# Patient Record
Sex: FEMALE | Race: BLACK OR AFRICAN AMERICAN | NOT HISPANIC OR LATINO | Employment: FULL TIME | URBAN - METROPOLITAN AREA
[De-identification: names, ages, dates, MRNs, and addresses within clinical notes are randomized per-mention and may not be internally consistent; named-entity substitution may affect disease eponyms.]

---

## 2017-01-09 ENCOUNTER — OFFICE VISIT (OUTPATIENT)
Dept: INTERNAL MEDICINE | Facility: CLINIC | Age: 72
End: 2017-01-09
Payer: COMMERCIAL

## 2017-01-09 ENCOUNTER — HOSPITAL ENCOUNTER (OUTPATIENT)
Dept: RADIOLOGY | Facility: HOSPITAL | Age: 72
Discharge: HOME | End: 2017-01-09
Attending: INTERNAL MEDICINE
Payer: COMMERCIAL

## 2017-01-09 DIAGNOSIS — M25.552 PAIN OF LEFT HIP JOINT: ICD-10-CM

## 2017-01-09 DIAGNOSIS — M25.552 PAIN OF LEFT HIP JOINT: Primary | ICD-10-CM

## 2017-01-09 DIAGNOSIS — N39.0 URINARY TRACT INFECTION WITHOUT HEMATURIA, SITE UNSPECIFIED: ICD-10-CM

## 2017-01-09 PROCEDURE — 73502 X-RAY EXAM HIP UNI 2-3 VIEWS: CPT | Mod: TC,LT

## 2017-01-09 PROCEDURE — 99213 OFFICE O/P EST LOW 20 MIN: CPT | Mod: S$GLB,,, | Performed by: INTERNAL MEDICINE

## 2017-01-09 PROCEDURE — 3079F DIAST BP 80-89 MM HG: CPT | Mod: S$GLB,,, | Performed by: INTERNAL MEDICINE

## 2017-01-09 PROCEDURE — 1157F ADVNC CARE PLAN IN RCRD: CPT | Mod: S$GLB,,, | Performed by: INTERNAL MEDICINE

## 2017-01-09 PROCEDURE — 1160F RVW MEDS BY RX/DR IN RCRD: CPT | Mod: S$GLB,,, | Performed by: INTERNAL MEDICINE

## 2017-01-09 PROCEDURE — 73502 X-RAY EXAM HIP UNI 2-3 VIEWS: CPT | Mod: 26,LT,, | Performed by: RADIOLOGY

## 2017-01-09 PROCEDURE — 99999 PR PBB SHADOW E&M-EST. PATIENT-LVL IV: CPT | Mod: PBBFAC,,, | Performed by: INTERNAL MEDICINE

## 2017-01-09 PROCEDURE — 1159F MED LIST DOCD IN RCRD: CPT | Mod: S$GLB,,, | Performed by: INTERNAL MEDICINE

## 2017-01-09 PROCEDURE — 3075F SYST BP GE 130 - 139MM HG: CPT | Mod: S$GLB,,, | Performed by: INTERNAL MEDICINE

## 2017-01-09 NOTE — MR AVS SNAPSHOT
[facility]  [address]  [place] [address]  Phone: [phone]  Fax: [phone]                  Kj Chávez   2017 11:30 AM   Office Visit    Description:  Female : 1945   Provider:  Ilana Hercules MD   Department:  [facility] - Internal Medicine           Reason for Visit     Hip Pain     Headache     Fall           Diagnoses this Visit        Comments    Pain of left hip joint    -  Primary     Urinary tract infection without hematuria, site unspecified                To Do List           Future Appointments        Provider Department Dept Phone    2017 1:30 PM [facility] [id number] [address] [facility] [phone]    2017 11:30 AM Ilana Hercules MD [facility] [place] [phone]      Goals (5 Years of Data)     None      Follow-Up and Disposition     Return for EP 1 month.      [facility]     [facility] Care [facility] -  Assistance  Registered nurses in the [facility] provide clinical advisement, health education, appointment booking, and other advisory services.  Call for this free service at [phone].             Medications           Message regarding Medications     Verify the changes and/or additions to your medication regime listed below are the same as discussed with your clinician today.  If any of these changes or additions are incorrect, please notify your healthcare provider.        STOP taking these medications     oxybutynin (DITROPAN-XL) 5 MG TR24 Take 1 tablet (5 mg total) by mouth once daily.           Verify that the below list of medications is an accurate representation of the medications you are currently taking.  If none reported, the list may be blank. If incorrect, please contact your healthcare provider. Carry this list with you in case of emergency.           Current Medications     BIOTIN ORAL Take by mouth once daily.    calcium citrate-vitamin D3 315-200 mg  "(CITRACAL+D) 315-200 mg-unit per tablet Take 1 tablet by mouth 2 (two) times daily.    CRAN/VITC/MANNOSE/FOS/BROMELN (CYSTEX CRANBERRY ORAL) Take by mouth once daily.    fish oil-omega-3 fatty acids 300-1,000 mg capsule Take 2 g by mouth once daily.      fluticasone (FLONASE) 50 mcg/actuation nasal spray 2 sprays by Each Nare route once daily.    loratadine-pseudoephedrine  mg (CLARITIN-D 24-HOUR)  mg per 24 hr tablet Take 1 tablet by mouth once daily.           Clinical Reference Information           Vital Signs - Last Recorded  Most recent update: 1/9/2017 11:36 AM by Kady Flores MA    BP Pulse Ht Wt SpO2 BMI    136/84 66 5' 6" (1.676 m) 72 kg (158 lb 11.7 oz) 97% 25.62 kg/m2      Blood Pressure          Most Recent Value    BP  136/84      Allergies as of 1/9/2017     Sulfa (Sulfonamide Antibiotics)    Codeine      Immunizations Administered on Date of Encounter - 1/9/2017     None      Orders Placed During Today's Visit      Normal Orders This Visit    Ambulatory consult to Orthopedics     Future Labs/Procedures Expected by Expires    Urinalysis  1/9/2017 3/10/2018    Urine culture  1/9/2017 3/10/2018    X-Ray Hip 2 View Left  1/9/2017 1/9/2018      [facility] Sign-Up     Activating your [facility] account is as easy as 1-2-3!     1) Visit [email or web address], select Sign Up Now, enter this activation code and your date of birth, then select Next.  [email or web address]  Expires: 2/23/2017 12:07 PM      2) Create a username and password to use when you visit [facility] in the future and select a security question in case you lose your password and select Next.    3) Enter your e-mail address and click Sign Up!    Additional Information  If you have questions, please e-mail [email or web address] or call [phone] to talk to our [facility] staff. Remember, [facility] is NOT to be used for urgent needs. For medical emergencies, dial 911.         "

## 2017-01-12 ENCOUNTER — OFFICE VISIT (OUTPATIENT)
Dept: ORTHOPEDICS | Facility: CLINIC | Age: 72
End: 2017-01-12
Payer: COMMERCIAL

## 2017-01-12 VITALS
WEIGHT: 158.75 LBS | HEIGHT: 66 IN | RESPIRATION RATE: 16 BRPM | SYSTOLIC BLOOD PRESSURE: 121 MMHG | BODY MASS INDEX: 25.51 KG/M2 | HEART RATE: 68 BPM | DIASTOLIC BLOOD PRESSURE: 73 MMHG

## 2017-01-12 DIAGNOSIS — M70.72 BURSITIS OF HIP, LEFT: Primary | ICD-10-CM

## 2017-01-12 PROCEDURE — 99999 PR PBB SHADOW E&M-EST. PATIENT-LVL III: CPT | Mod: PBBFAC,,, | Performed by: PHYSICIAN ASSISTANT

## 2017-01-12 PROCEDURE — 1157F ADVNC CARE PLAN IN RCRD: CPT | Mod: S$GLB,,, | Performed by: PHYSICIAN ASSISTANT

## 2017-01-12 PROCEDURE — 99203 OFFICE O/P NEW LOW 30 MIN: CPT | Mod: 25,S$GLB,, | Performed by: PHYSICIAN ASSISTANT

## 2017-01-12 PROCEDURE — 20610 DRAIN/INJ JOINT/BURSA W/O US: CPT | Mod: LT,S$GLB,, | Performed by: PHYSICIAN ASSISTANT

## 2017-01-12 PROCEDURE — 1160F RVW MEDS BY RX/DR IN RCRD: CPT | Mod: S$GLB,,, | Performed by: PHYSICIAN ASSISTANT

## 2017-01-12 PROCEDURE — 1125F AMNT PAIN NOTED PAIN PRSNT: CPT | Mod: S$GLB,,, | Performed by: PHYSICIAN ASSISTANT

## 2017-01-12 PROCEDURE — 1159F MED LIST DOCD IN RCRD: CPT | Mod: S$GLB,,, | Performed by: PHYSICIAN ASSISTANT

## 2017-01-12 PROCEDURE — 3074F SYST BP LT 130 MM HG: CPT | Mod: S$GLB,,, | Performed by: PHYSICIAN ASSISTANT

## 2017-01-12 PROCEDURE — 3078F DIAST BP <80 MM HG: CPT | Mod: S$GLB,,, | Performed by: PHYSICIAN ASSISTANT

## 2017-01-12 RX ORDER — BETAMETHASONE SODIUM PHOSPHATE AND BETAMETHASONE ACETATE 3; 3 MG/ML; MG/ML
6 INJECTION, SUSPENSION INTRA-ARTICULAR; INTRALESIONAL; INTRAMUSCULAR; SOFT TISSUE
Status: COMPLETED | OUTPATIENT
Start: 2017-01-12 | End: 2017-01-12

## 2017-01-12 RX ADMIN — BETAMETHASONE SODIUM PHOSPHATE AND BETAMETHASONE ACETATE 6 MG: 3; 3 INJECTION, SUSPENSION INTRA-ARTICULAR; INTRALESIONAL; INTRAMUSCULAR; SOFT TISSUE at 01:01

## 2017-01-12 NOTE — PROGRESS NOTES
SUBJECTIVE:     Chief Complaint & History of Present Illness:  Kj Chávez is a New patient 71 y.o. female who is seen here today with a complaint of    Chief Complaint   Patient presents with    Left Hip - Pain    .  She reports she's had pain and tenderness in the lateral aspect of her left hip for over 2 months.  She does not remember a specific trauma or injury to the hip but has had persistent pain in this area not necessarily associated with activity and or resting.  She has taken NSAIDs and warm compresses to the area but has had little relief of her symptoms  On a scale of 1-10, with 10 being worst pain imaginable, he rates this pain as 4 on good days and 8 on bad days.  she describes the pain as tender and sore.      Past Medical History   Diagnosis Date    Allergy     Colon polyp     Gastric nodule     GERD (gastroesophageal reflux disease)     Hypertension      mild, resolved     Osteoporosis        Past Surgical History   Procedure Laterality Date    Tubal ligation      Colonoscopy N/A 2/23/2016     Procedure: COLONOSCOPY;  Surgeon: Christian Medina MD;  Location: [address]);  Service: Endoscopy;  Laterality: N/A;       Family History   Problem Relation Age of Onset    Cancer Mother 80     colon    No Known Problems Father        Review of patient's allergies indicates:   Allergen Reactions    Sulfa (sulfonamide antibiotics) Swelling    Codeine Anxiety         Current Outpatient Prescriptions:     BIOTIN ORAL, Take by mouth once daily., Disp: , Rfl:     calcium citrate-vitamin D3 315-200 mg (CITRACAL+D) 315-200 mg-unit per tablet, Take 1 tablet by mouth 2 (two) times daily., Disp: , Rfl:     CRAN/VITC/MANNOSE/FOS/BROMELN (CYSTEX CRANBERRY ORAL), Take by mouth once daily., Disp: , Rfl:     fish oil-omega-3 fatty acids 300-1,000 mg capsule, Take 2 g by mouth once daily.  , Disp: , Rfl:     fluticasone (FLONASE) 50 mcg/actuation nasal spray, 2 sprays by Each Nare route once  "daily., Disp: 1 Bottle, Rfl: 0    loratadine-pseudoephedrine  mg (CLARITIN-D 24-HOUR)  mg per 24 hr tablet, Take 1 tablet by mouth once daily., Disp: , Rfl:     Review of Systems:  ROS:  Constitutional: no fever or chills  Eyes: no visual changes  ENT: no nasal congestion or sore throat  Respiratory: no cough or shortness of breath  Cardiovascular: no chest pain or palpitations, Positive for aortic atherosclerosis  Gastrointestinal: no nausea or vomiting, tolerating diet  Genitourinary: no hematuria or dysuria  Integument/Breast: no rash or pruritis  Hematologic/Lymphatic: no easy bruising or lymphadenopathy  Musculoskeletal: no arthralgias or myalgias  Neurological: no seizures or tremors  Behavioral/Psych: no auditory or visual hallucinations  Endocrine: no heat or cold intolerance      PE:  Visit Vitals    /73    Pulse 68    Resp 16    Ht 5' 6" (1.676 m)    Wt 72 kg (158 lb 11.7 oz)    BMI 25.62 kg/m2     General: Pleasant, cooperative, NAD   HEENT: NCAT, sclera nonicteric   Lungs: Respirations are equal and unlabored.   Abdomen: Soft and non-tender.  CV: 2+ bilateral upper and lower extremity pulses.   Skin: Intact throughout LE with no rashes, erythema, or lesions  Extremities: No LE edema, NVI lower extremities        Hip Exam:   leftpositives: tenderness over greater trochanter and negatives: FROM  pulses full    140 degrees flexion  -10 degrees extension   40 degrees internal rotation  40 degrees external rotation  45 degrees abduction  -05 degrees adduction   0 flexion contracture      RADIOGRAPHS:  X-rays from previous visit reviewed by me today demonstrate no evidence of fracture dislocation or other bony abnormality no arthritic changes throughout the hip joint space are well-maintained    ASSESSMENT/PLAN:     Plan: We discussed with the patient at length all the different treatment options available for arthrosis of the hip including anti-inflammatories, acetaminophen, rest, " ice, lower extremity strengthening exercise, occasional cortisone injections for temporary relief, and finally total hip arthroplasty.   We will proceed with therapeutic diagnostic cortisone injection of the left greater trochanteric bursa     The injection site was identified and the skin was prepared with an ETOH solution. The  greater trochanteric bursa of the  left  hip was injected with 1 ml of Celestone and 5 ml Lidocaine under sterile technique. Kj Chávez tolerated the procedure well, she was advised to rest the  hip  today, ice and support. she did receive immediate relief of the pain in and about her  hip  she was told this would be short lived and is secondary to the lidocaine. she may have an increase in her discomfort tonight followed by steady improvement over the next several days. It may take 1-3 weeks following the injection to get the full benefit of the medication.  I will see her back in 3-6 months. Sooner if she has any problems or concerns.

## 2017-01-12 NOTE — LETTER
January 12, 2017      Ilana Hercules MD  [address]  [place] [address]           [facility]  [address]  [place] [address]  Phone: [phone]          Patient: Kj Chávez   MR Number: [id number]   YOB: 1945   Date of Visit: 1/12/2017       Dear Dr. Ilana Hercules:    Thank you for referring Kj Chávez to me for evaluation. Attached you will find relevant portions of my assessment and plan of care.    If you have questions, please do not hesitate to call me. I look forward to following Kj Chávez along with you.    Sincerely,    Praveen Bianchi PA-C    Enclosure  CC:  No Recipients    If you would like to receive this communication electronically, please contact [email or web address] or [phone] to request more information on [employer] [facility] access.    For providers and/or their staff who would like to refer a patient to [facility], please contact us through our one-stop-shop provider referral line, [facility] , at [phone].    If you feel you have received this communication in error or would no longer like to receive these types of communications, please e-mail [email or web address]

## 2017-01-14 VITALS
TEMPERATURE: 99 F | BODY MASS INDEX: 25.51 KG/M2 | SYSTOLIC BLOOD PRESSURE: 136 MMHG | HEIGHT: 66 IN | WEIGHT: 158.75 LBS | OXYGEN SATURATION: 97 % | DIASTOLIC BLOOD PRESSURE: 84 MMHG | HEART RATE: 66 BPM

## 2017-01-14 NOTE — PROGRESS NOTES
Subjective:       Patient ID: Kj Chávez is a 71 y.o. female.    Chief Complaint: Hip Pain (left hip x2 mo ) and Fall (in 10/2016)    HPI: She complains of an approximately 2 month history of left hip pain.  She fell in October.  Has had pain since that time.  Denies numbness, no weakness  Review of Systems   Constitutional: Negative for chills, fatigue, fever and unexpected weight change.   Respiratory: Negative for chest tightness and shortness of breath.    Cardiovascular: Negative for chest pain and palpitations.   Gastrointestinal: Negative for abdominal pain and blood in stool.   Neurological: Negative for dizziness, syncope, numbness and headaches.       Objective:      Physical Exam   HENT:   Right Ear: External ear normal.   Left Ear: External ear normal.   Nose: Nose normal.   Mouth/Throat: Oropharynx is clear and moist.   Eyes: Pupils are equal, round, and reactive to light.   Neck: Normal range of motion.   Cardiovascular: Normal rate and regular rhythm.    No murmur heard.  Pulmonary/Chest: Breath sounds normal.   Abdominal: She exhibits no distension. There is no hepatosplenomegaly. There is no tenderness.   Lymphadenopathy:     She has no cervical adenopathy.     She has no axillary adenopathy.   Neurological: She has normal strength and normal reflexes. No cranial nerve deficit or sensory deficit.     left hip without tenderness or redness  Assessment:     assessment and plan: Hip pain: Check left hip x-ray.  Urine sent for urinalysis and culture.  She was here for an urgent care only appointment.  She will return to clinic for a physical      Plan:       As above

## 2017-01-26 ENCOUNTER — OFFICE VISIT (OUTPATIENT)
Dept: INTERNAL MEDICINE | Facility: CLINIC | Age: 72
End: 2017-01-26
Payer: COMMERCIAL

## 2017-01-26 VITALS
DIASTOLIC BLOOD PRESSURE: 70 MMHG | TEMPERATURE: 99 F | HEART RATE: 88 BPM | WEIGHT: 158 LBS | OXYGEN SATURATION: 95 % | HEIGHT: 66 IN | SYSTOLIC BLOOD PRESSURE: 124 MMHG | BODY MASS INDEX: 25.39 KG/M2

## 2017-01-26 DIAGNOSIS — J01.10 ACUTE NON-RECURRENT FRONTAL SINUSITIS: Primary | ICD-10-CM

## 2017-01-26 LAB
CTP QC/QA: YES
S PYO RRNA THROAT QL PROBE: NEGATIVE

## 2017-01-26 PROCEDURE — 1159F MED LIST DOCD IN RCRD: CPT | Mod: S$GLB,,, | Performed by: NURSE PRACTITIONER

## 2017-01-26 PROCEDURE — 1157F ADVNC CARE PLAN IN RCRD: CPT | Mod: S$GLB,,, | Performed by: NURSE PRACTITIONER

## 2017-01-26 PROCEDURE — 3078F DIAST BP <80 MM HG: CPT | Mod: S$GLB,,, | Performed by: NURSE PRACTITIONER

## 2017-01-26 PROCEDURE — 99999 PR PBB SHADOW E&M-EST. PATIENT-LVL IV: CPT | Mod: PBBFAC,,, | Performed by: NURSE PRACTITIONER

## 2017-01-26 PROCEDURE — 87880 STREP A ASSAY W/OPTIC: CPT | Mod: QW,S$GLB,, | Performed by: NURSE PRACTITIONER

## 2017-01-26 PROCEDURE — 3074F SYST BP LT 130 MM HG: CPT | Mod: S$GLB,,, | Performed by: NURSE PRACTITIONER

## 2017-01-26 PROCEDURE — 1125F AMNT PAIN NOTED PAIN PRSNT: CPT | Mod: S$GLB,,, | Performed by: NURSE PRACTITIONER

## 2017-01-26 PROCEDURE — 1160F RVW MEDS BY RX/DR IN RCRD: CPT | Mod: S$GLB,,, | Performed by: NURSE PRACTITIONER

## 2017-01-26 PROCEDURE — 99213 OFFICE O/P EST LOW 20 MIN: CPT | Mod: 25,S$GLB,, | Performed by: NURSE PRACTITIONER

## 2017-01-26 RX ORDER — PROMETHAZINE HYDROCHLORIDE AND DEXTROMETHORPHAN HYDROBROMIDE 6.25; 15 MG/5ML; MG/5ML
5 SYRUP ORAL
Qty: 120 ML | Refills: 0 | Status: SHIPPED | OUTPATIENT
Start: 2017-01-26 | End: 2017-02-05

## 2017-01-26 RX ORDER — AMOXICILLIN AND CLAVULANATE POTASSIUM 875; 125 MG/1; MG/1
1 TABLET, FILM COATED ORAL EVERY 12 HOURS
Qty: 14 TABLET | Refills: 0 | Status: SHIPPED | OUTPATIENT
Start: 2017-01-26 | End: 2017-03-01

## 2017-01-26 NOTE — PROGRESS NOTES
Subjective:       Patient ID: Kj Chávez is a 71 y.o. female.    Chief Complaint: Nasal Congestion    URI    This is a new problem. The current episode started in the past 7 days. The problem has been rapidly worsening. The maximum temperature recorded prior to her arrival was 100.4 - 100.9 F. The fever has been present for 1 to 2 days. Associated symptoms include congestion, coughing, headaches, rhinorrhea, sinus pain, a sore throat and swollen glands. Pertinent negatives include no abdominal pain, chest pain, diarrhea, dysuria, ear pain, joint pain, joint swelling, nausea, neck pain, plugged ear sensation, rash, sneezing, vomiting or wheezing. Treatments tried: mucinex. The treatment provided no relief.     Review of Systems   Constitutional: Positive for chills, diaphoresis and fatigue.   HENT: Positive for congestion, rhinorrhea and sore throat. Negative for ear pain and sneezing.    Eyes: Negative for visual disturbance.   Respiratory: Positive for cough. Negative for wheezing.    Cardiovascular: Negative for chest pain.   Gastrointestinal: Negative for abdominal pain, diarrhea, nausea and vomiting.   Genitourinary: Negative for dysuria.   Musculoskeletal: Negative for joint pain and neck pain.   Skin: Negative for rash.   Neurological: Positive for headaches.   Psychiatric/Behavioral: Negative for confusion.       Objective:      Physical Exam   Constitutional: She is oriented to person, place, and time. She appears well-developed and well-nourished. No distress.   HENT:   Head: Normocephalic.   Nose: Mucosal edema, rhinorrhea and sinus tenderness present. Right sinus exhibits frontal sinus tenderness. Right sinus exhibits no maxillary sinus tenderness. Left sinus exhibits frontal sinus tenderness. Left sinus exhibits no maxillary sinus tenderness.   Mouth/Throat: No oropharyngeal exudate. Tonsils are 2+ on the right. Tonsils are 2+ on the left. Tonsillar exudate.   Eyes: No scleral icterus.   Neck:  Normal range of motion. Neck supple.   Cardiovascular: Normal rate, regular rhythm and normal heart sounds.    Pulmonary/Chest: Effort normal and breath sounds normal. No respiratory distress. She has no wheezes. She has no rales.   Lymphadenopathy:     She has cervical adenopathy.   Neurological: She is alert and oriented to person, place, and time.   Skin: Skin is warm and dry. She is not diaphoretic.   Psychiatric: She has a normal mood and affect. Her behavior is normal.   Nursing note and vitals reviewed.      Assessment:       1. Acute non-recurrent frontal sinusitis        Plan:   1. Acute non-recurrent frontal sinusitis  - POCT rapid strep A  - amoxicillin-clavulanate 875-125mg (AUGMENTIN) 875-125 mg per tablet; Take 1 tablet by mouth every 12 (twelve) hours.  Dispense: 14 tablet; Refill: 0  - promethazine-dextromethorphan (PROMETHAZINE-DM) 6.25-15 mg/5 mL Syrp; Take 5 mLs by mouth every 6 to 8 hours as needed.  Dispense: 120 mL; Refill: 0      Pt has been given instructions populated from [employer] database and has verbalized understanding of the after visit summary and information contained wherein.    Follow up with a primary care provider. May go to ER for acute shortness of breath, lightheadedness, fever, or any other emergent complaints or changes in condition.

## 2017-01-26 NOTE — MR AVS SNAPSHOT
[facility]  [address]  [facility] [address]  Phone: [phone]  Fax: [phone]                  Kj Chávez   2017 2:30 PM   Office Visit    Description:  Female : 1945   Provider:  Greta Darby NP   Department:  [facility]           Reason for Visit     Nasal Congestion           Diagnoses this Visit        Comments    Acute non-recurrent frontal sinusitis    -  Primary            To Do List           Future Appointments        Provider Department Dept Phone    2017 11:30 AM Ilana Hercules MD [facility] [phone]      Goals (5 Years of Data)     None       These Medications        Disp Refills Start End    amoxicillin-clavulanate 875-125mg (AUGMENTIN) 875-125 mg per tablet 14 tablet 0 2017     Take 1 tablet by mouth every 12 (twelve) hours. - Oral    Pharmacy: [facility] [address] [place] [address] [address] [place] [address] Ph #: [phone]       promethazine-dextromethorphan (PROMETHAZINE-DM) 6.25-15 mg/5 mL Syrp 120 mL 0 2017    Take 5 mLs by mouth every 6 to 8 hours as needed. - Oral    Pharmacy: [facility] [address] [place] [address] [address] [facility] Ph #: [phone]         [facility]     [facility] On Call Nurse Care Line -  Assistance  Registered nurses in the [facility] Call Center provide clinical advisement, health education, appointment booking, and other advisory services.  Call for this free service at [phone].             Medications           Message regarding Medications     Verify the changes and/or additions to your medication regime listed below are the same as discussed with your clinician today.  If any of these changes or additions are incorrect, please notify your healthcare provider.        START taking these NEW medications        Refills    amoxicillin-clavulanate 875-125mg (AUGMENTIN) 875-125 mg per tablet 0    Sig: Take 1 tablet by mouth  "every 12 (twelve) hours.    Class: Normal    Route: Oral    promethazine-dextromethorphan (PROMETHAZINE-DM) 6.25-15 mg/5 mL Syrp 0    Sig: Take 5 mLs by mouth every 6 to 8 hours as needed.    Class: Normal    Route: Oral           Verify that the below list of medications is an accurate representation of the medications you are currently taking.  If none reported, the list may be blank. If incorrect, please contact your healthcare provider. Carry this list with you in case of emergency.           Current Medications     BIOTIN ORAL Take by mouth once daily.    calcium citrate-vitamin D3 315-200 mg (CITRACAL+D) 315-200 mg-unit per tablet Take 1 tablet by mouth 2 (two) times daily.    CRAN/VITC/MANNOSE/FOS/BROMELN (CYSTEX CRANBERRY ORAL) Take by mouth once daily.    fish oil-omega-3 fatty acids 300-1,000 mg capsule Take 2 g by mouth once daily.      fluticasone (FLONASE) 50 mcg/actuation nasal spray 2 sprays by Each Nare route once daily.    loratadine-pseudoephedrine  mg (CLARITIN-D 24-HOUR)  mg per 24 hr tablet Take 1 tablet by mouth once daily.    amoxicillin-clavulanate 875-125mg (AUGMENTIN) 875-125 mg per tablet Take 1 tablet by mouth every 12 (twelve) hours.    promethazine-dextromethorphan (PROMETHAZINE-DM) 6.25-15 mg/5 mL Syrp Take 5 mLs by mouth every 6 to 8 hours as needed.           Clinical Reference Information           Vital Signs - Last Recorded  Most recent update: 1/26/2017  2:41 PM by Emily Mathis MA    BP Pulse Temp Ht Wt SpO2    124/70 88 99.2 °F (37.3 °C) 5' 6" (1.676 m) 71.7 kg (158 lb) 95%    BMI                25.5 kg/m2          Blood Pressure          Most Recent Value    BP  124/70      Allergies as of 1/26/2017     Sulfa (Sulfonamide Antibiotics)    Codeine      Immunizations Administered on Date of Encounter - 1/26/2017     None      Orders Placed During Today's Visit      Normal Orders This Visit    POCT rapid strep A                   [facility] Sign-Up     Activating " your [facility] account is as easy as 1-2-3!     1) Visit [email or web address], select Sign Up Now, enter this activation code and your date of birth, then select Next.  [email or web address]  Expires: 2/23/2017 12:07 PM      2) Create a username and password to use when you visit [facility] in the future and select a security question in case you lose your password and select Next.    3) Enter your e-mail address and click Sign Up!    Additional Information  If you have questions, please e-mail [email or web address] or call [phone] to talk to our [facility] staff. Remember, [facility] is NOT to be used for urgent needs. For medical emergencies, dial 911.         Instructions    Alternate Tylenol and Advil every 4 hours for fever.  Drink LOTS of water  Eat yogurt daily while taking antibiotics.

## 2017-01-26 NOTE — PATIENT INSTRUCTIONS
Alternate Tylenol and Advil every 4 hours for fever.  Drink LOTS of water  Eat yogurt daily while taking antibiotics.

## 2017-02-09 ENCOUNTER — OFFICE VISIT (OUTPATIENT)
Dept: INTERNAL MEDICINE | Facility: CLINIC | Age: 72
End: 2017-02-09
Payer: COMMERCIAL

## 2017-02-09 DIAGNOSIS — Z01.419 ENCOUNTER FOR WELL WOMAN EXAM WITH ROUTINE GYNECOLOGICAL EXAM: ICD-10-CM

## 2017-02-09 DIAGNOSIS — N12 PYELONEPHRITIS: Primary | ICD-10-CM

## 2017-02-09 PROCEDURE — 3079F DIAST BP 80-89 MM HG: CPT | Mod: S$GLB,,, | Performed by: INTERNAL MEDICINE

## 2017-02-09 PROCEDURE — 1160F RVW MEDS BY RX/DR IN RCRD: CPT | Mod: S$GLB,,, | Performed by: INTERNAL MEDICINE

## 2017-02-09 PROCEDURE — 1126F AMNT PAIN NOTED NONE PRSNT: CPT | Mod: S$GLB,,, | Performed by: INTERNAL MEDICINE

## 2017-02-09 PROCEDURE — 1159F MED LIST DOCD IN RCRD: CPT | Mod: S$GLB,,, | Performed by: INTERNAL MEDICINE

## 2017-02-09 PROCEDURE — 1157F ADVNC CARE PLAN IN RCRD: CPT | Mod: S$GLB,,, | Performed by: INTERNAL MEDICINE

## 2017-02-09 PROCEDURE — 99999 PR PBB SHADOW E&M-EST. PATIENT-LVL IV: CPT | Mod: PBBFAC,,, | Performed by: INTERNAL MEDICINE

## 2017-02-09 PROCEDURE — 99213 OFFICE O/P EST LOW 20 MIN: CPT | Mod: S$GLB,,, | Performed by: INTERNAL MEDICINE

## 2017-02-09 PROCEDURE — 3075F SYST BP GE 130 - 139MM HG: CPT | Mod: S$GLB,,, | Performed by: INTERNAL MEDICINE

## 2017-02-09 RX ORDER — DOXYCYCLINE 100 MG/1
100 CAPSULE ORAL 2 TIMES DAILY
Qty: 14 CAPSULE | Refills: 0 | Status: SHIPPED | OUTPATIENT
Start: 2017-02-09 | End: 2017-03-01

## 2017-02-09 NOTE — MR AVS SNAPSHOT
[facility]  [address]  [place] [address]  Phone: [phone]  Fax: [phone]                  Kj Chávez   2017 11:30 AM   Office Visit    Description:  Female : 1945   Provider:  Ilana Hercules MD   Department:  [facility]           Reason for Visit     Follow-up           Diagnoses this Visit        Comments    Pyelonephritis    -  Primary     Encounter for well woman exam with routine gynecological exam                To Do List           Future Appointments        Provider Department Dept Phone    2017 12:30 PM LAB, APPOINTMENT [facility] [phone]      Goals (5 Years of Data)     None       These Medications        Disp Refills Start End    doxycycline (MONODOX) 100 MG capsule 14 capsule 0 2017     Take 1 capsule (100 mg total) by mouth 2 (two) times daily. With food - Oral    Pharmacy: [facility] [address] [place] [address] [address] Ph #: [phone]         [facility] On Call     [facility] Nurse Care Line -  Assistance  Registered nurses in the [facility] Center provide clinical advisement, health education, appointment booking, and other advisory services.  Call for this free service at [phone].             Medications           Message regarding Medications     Verify the changes and/or additions to your medication regime listed below are the same as discussed with your clinician today.  If any of these changes or additions are incorrect, please notify your healthcare provider.        START taking these NEW medications        Refills    doxycycline (MONODOX) 100 MG capsule 0    Sig: Take 1 capsule (100 mg total) by mouth 2 (two) times daily. With food    Class: Normal    Route: Oral           Verify that the below list of medications is an accurate representation of the medications you are currently taking.  If none reported, the list may be  "blank. If incorrect, please contact your healthcare provider. Carry this list with you in case of emergency.           Current Medications     amoxicillin-clavulanate 875-125mg (AUGMENTIN) 875-125 mg per tablet Take 1 tablet by mouth every 12 (twelve) hours.    BIOTIN ORAL Take by mouth once daily.    calcium citrate-vitamin D3 315-200 mg (CITRACAL+D) 315-200 mg-unit per tablet Take 1 tablet by mouth 2 (two) times daily.    CRAN/VITC/MANNOSE/FOS/BROMELN (CYSTEX CRANBERRY ORAL) Take by mouth once daily.    fish oil-omega-3 fatty acids 300-1,000 mg capsule Take 2 g by mouth once daily.      fluticasone (FLONASE) 50 mcg/actuation nasal spray 2 sprays by Each Nare route once daily.    loratadine-pseudoephedrine  mg (CLARITIN-D 24-HOUR)  mg per 24 hr tablet Take 1 tablet by mouth once daily.    doxycycline (MONODOX) 100 MG capsule Take 1 capsule (100 mg total) by mouth 2 (two) times daily. With food           Clinical Reference Information           Your Vitals Were     BP Pulse Height Weight SpO2 BMI    130/88 69 5' 6" (1.676 m) 73.1 kg (161 lb 2.5 oz) 97% 26.01 kg/m2      Blood Pressure          Most Recent Value    BP  130/88      Allergies as of 2/9/2017     Sulfa (Sulfonamide Antibiotics)    Codeine      Immunizations Administered on Date of Encounter - 2/9/2017     None      Orders Placed During Today's Visit      Normal Orders This Visit    Ambulatory Referral to Obstetrics / Gynecology     Future Labs/Procedures Expected by Expires    Comprehensive metabolic panel  2/9/2017 2/9/2018    CT Abdomen Pelvis With Contrast  2/9/2017 2/9/2018    Lipid panel  2/9/2017 2/9/2018      [facility] Sign-Up     Activating your [facility] account is as easy as 1-2-3!     1) Visit [email or web address], select Sign Up Now, enter this activation code and your date of birth, then select Next.  [email or web address]  Expires: 2/23/2017 12:07 PM      2) Create a username and password to use when you visit [facility] in the future and " select a security question in case you lose your password and select Next.    3) Enter your e-mail address and click Sign Up!    Additional Information  If you have questions, please e-mail [email or web address] or call [phone] to talk to our [facility] staff. Remember, [facility] is NOT to be used for urgent needs. For medical emergencies, dial 911.         Language Assistance Services     ATTENTION: Language assistance services are available, free of charge. Please call [phone].      ATENCIÓN: Si habla español, tiene a vásquez disposición servicios gratuitos de asistencia lingüística. Llame al [phone].     CHÚ Ý: N?u b?n nói Ti?ng Vi?t, có các d?ch v? h? tr? ngôn ng? mi?n phí dành cho b?n. G?i s? [phone].         [facility] complies with applicable Federal civil rights laws and does not discriminate on the basis of race, color, national origin, age, disability, or sex.

## 2017-02-11 ENCOUNTER — TELEPHONE (OUTPATIENT)
Dept: INTERNAL MEDICINE | Facility: CLINIC | Age: 72
End: 2017-02-11

## 2017-02-11 DIAGNOSIS — E78.5 HYPERLIPIDEMIA, UNSPECIFIED HYPERLIPIDEMIA TYPE: Primary | ICD-10-CM

## 2017-02-11 NOTE — TELEPHONE ENCOUNTER
Blayneease contact patient and let her know all of her labwork is acceptable except her cholesterol is elevated.Please advise her to follow a low fat diet. If she would like to see a dietician, order in,please schedule.    I would like to recheck her cholesterol in 3 months. Lab orders in,please schedule for May

## 2017-02-12 VITALS
TEMPERATURE: 99 F | BODY MASS INDEX: 25.9 KG/M2 | OXYGEN SATURATION: 97 % | WEIGHT: 161.19 LBS | HEART RATE: 69 BPM | HEIGHT: 66 IN | DIASTOLIC BLOOD PRESSURE: 88 MMHG | SYSTOLIC BLOOD PRESSURE: 130 MMHG

## 2017-02-13 NOTE — PROGRESS NOTES
Subjective:       Patient ID: jK Chávez is a 71 y.o. female.    Chief Complaint: Follow-up    HPI: She was recently hospitalized with pyelonephritis.  Please see hospitalization records.  Currently asymptomatic    Past medical history: Anemia, pyelonephritis, positive family history of colon cancer-mother, colon adenoma.  She had a colonoscopy February 2016    Medications: None    ALLERGIES: Sulfa, codeine      Review of Systems   Constitutional: Negative for chills, fatigue, fever and unexpected weight change.   Respiratory: Negative for chest tightness and shortness of breath.    Cardiovascular: Negative for chest pain and palpitations.   Gastrointestinal: Negative for abdominal pain and blood in stool.   Neurological: Negative for dizziness, syncope, numbness and headaches.       Objective:      Physical Exam   HENT:   Right Ear: External ear normal.   Left Ear: External ear normal.   Nose: Nose normal.   Mouth/Throat: Oropharynx is clear and moist.   Eyes: Pupils are equal, round, and reactive to light.   Neck: Normal range of motion.   Cardiovascular: Normal rate and regular rhythm.    No murmur heard.  Pulmonary/Chest: Breath sounds normal.   Abdominal: She exhibits no distension. There is no hepatosplenomegaly. There is no tenderness.   Lymphadenopathy:     She has no cervical adenopathy.     She has no axillary adenopathy.   Neurological: She has normal strength and normal reflexes. No cranial nerve deficit or sensory deficit.     rectal exam: Heme-negative, no mass palpated  Assessment:         assessment and plan: Pyelonephritis: Schedule repeat CT scan abdomen and pelvis.  Check CMP and lipid panel  Plan:       As above

## 2017-02-15 ENCOUNTER — HOSPITAL ENCOUNTER (OUTPATIENT)
Dept: RADIOLOGY | Facility: HOSPITAL | Age: 72
Discharge: HOME | End: 2017-02-15
Attending: INTERNAL MEDICINE
Payer: COMMERCIAL

## 2017-02-15 DIAGNOSIS — N12 PYELONEPHRITIS: ICD-10-CM

## 2017-02-15 PROCEDURE — 74177 CT ABD & PELVIS W/CONTRAST: CPT | Mod: TC

## 2017-02-15 PROCEDURE — 74177 CT ABD & PELVIS W/CONTRAST: CPT | Mod: 26,,, | Performed by: RADIOLOGY

## 2017-02-15 PROCEDURE — 25500020 PHARM REV CODE 255: Performed by: INTERNAL MEDICINE

## 2017-02-15 RX ADMIN — IOHEXOL 75 ML: 350 INJECTION, SOLUTION INTRAVENOUS at 07:02

## 2017-02-15 RX ADMIN — IOHEXOL 15 ML: 350 INJECTION, SOLUTION INTRAVENOUS at 05:02

## 2017-02-15 RX ADMIN — IOHEXOL 15 ML: 350 INJECTION, SOLUTION INTRAVENOUS at 04:02

## 2017-02-18 ENCOUNTER — TELEPHONE (OUTPATIENT)
Dept: INTERNAL MEDICINE | Facility: CLINIC | Age: 72
End: 2017-02-18

## 2017-02-18 DIAGNOSIS — N28.89 RENAL MASS: Primary | ICD-10-CM

## 2017-03-01 ENCOUNTER — OFFICE VISIT (OUTPATIENT)
Dept: UROLOGY | Facility: CLINIC | Age: 72
End: 2017-03-01
Payer: COMMERCIAL

## 2017-03-01 VITALS
WEIGHT: 157.88 LBS | HEART RATE: 72 BPM | DIASTOLIC BLOOD PRESSURE: 73 MMHG | BODY MASS INDEX: 25.37 KG/M2 | HEIGHT: 66 IN | SYSTOLIC BLOOD PRESSURE: 134 MMHG

## 2017-03-01 DIAGNOSIS — R39.15 URGENCY OF URINATION: Primary | ICD-10-CM

## 2017-03-01 DIAGNOSIS — N39.41 URGE INCONTINENCE OF URINE: ICD-10-CM

## 2017-03-01 PROCEDURE — 99213 OFFICE O/P EST LOW 20 MIN: CPT | Mod: 25,S$GLB,, | Performed by: PHYSICIAN ASSISTANT

## 2017-03-01 PROCEDURE — 1126F AMNT PAIN NOTED NONE PRSNT: CPT | Mod: S$GLB,,, | Performed by: PHYSICIAN ASSISTANT

## 2017-03-01 PROCEDURE — 51798 US URINE CAPACITY MEASURE: CPT | Mod: S$GLB,,, | Performed by: PHYSICIAN ASSISTANT

## 2017-03-01 PROCEDURE — 1157F ADVNC CARE PLAN IN RCRD: CPT | Mod: S$GLB,,, | Performed by: PHYSICIAN ASSISTANT

## 2017-03-01 PROCEDURE — 1160F RVW MEDS BY RX/DR IN RCRD: CPT | Mod: S$GLB,,, | Performed by: PHYSICIAN ASSISTANT

## 2017-03-01 PROCEDURE — 81002 URINALYSIS NONAUTO W/O SCOPE: CPT | Mod: S$GLB,,, | Performed by: PHYSICIAN ASSISTANT

## 2017-03-01 PROCEDURE — 3078F DIAST BP <80 MM HG: CPT | Mod: S$GLB,,, | Performed by: PHYSICIAN ASSISTANT

## 2017-03-01 PROCEDURE — 1159F MED LIST DOCD IN RCRD: CPT | Mod: S$GLB,,, | Performed by: PHYSICIAN ASSISTANT

## 2017-03-01 PROCEDURE — 3075F SYST BP GE 130 - 139MM HG: CPT | Mod: S$GLB,,, | Performed by: PHYSICIAN ASSISTANT

## 2017-03-01 PROCEDURE — 99999 PR PBB SHADOW E&M-EST. PATIENT-LVL III: CPT | Mod: PBBFAC,,, | Performed by: PHYSICIAN ASSISTANT

## 2017-03-01 RX ORDER — OXYBUTYNIN CHLORIDE 5 MG/1
5 TABLET, EXTENDED RELEASE ORAL DAILY
Qty: 30 TABLET | Refills: 11 | Status: SHIPPED | OUTPATIENT
Start: 2017-03-01 | End: 2017-12-18 | Stop reason: SDUPTHER

## 2017-03-01 NOTE — PROGRESS NOTES
"CHIEF COMPLAINT:    Mrs. Chávez is a 71 y.o. female presenting to "discuss CT scan".  PRESENTING ILLNESS:    Kj Chávez is a 71 y.o. female with a PMH of pyelonephritis who presents for "discuss CT scan".  Patient reports having a CT scan done instructed by internal medicine to follow up with urology.   She has a history of pyelonephritis and was treated in March 2016.  She was last seen in clinic in April 2016.  At that time she was prescribed oxybutynin for her urinary symptoms (frequency, urgency). Her symptoms were controlled with Oxybutynin.  She did not know she had refills so she is no longer taking it.    She no longer has frequency but reports urgency.  She rarely has urge incontinence.  She typically does not have nocturia unless she drinks close to bed time.    She tries to limit her tea intake.  She reports complete bladder emptying.      CT abd/pelvis 2/2017: No CT evidence to suggest acute pyelonephritis.  Stable right renal simple cyst. Probable small subcentimeter left renal cyst       CT abd/pelvis 2/29/16: Geographic areas of decreased enhancement present within the right kidney suggesting pyelonephritis.  No evidence of renal abscess.  Follow-up examination is recommended after treatment to ensure resolution as renal infarction and neoplasm can present in a similar fashion.  Additionally, there is abnormal wall thickening enhancement present along the right renal pelvis and ureter suggesting pyeloureteritis.      Urine cultures:   2/29/16: ESCHERICHIA COLI>100,000     REVIEW OF SYSTEMS:  Constitutional: Negative for fever and chills.   HENT: Negative for hearing loss.   Eyes: Negative for visual disturbance.   Respiratory: Negative for shortness of breath.   Cardiovascular: Negative for chest pain.   Gastrointestinal: Negative for nausea, vomiting, and constipation.   Genitourinary: Positive for urgency.  Negative for  frequency, difficulty urinating, dysuria, hematuria, intermittency, " hesitancy, incomplete bladder emptying, and decreased urine volume.   Neurological: Positive for dizziness when standing too quickly.   Hematological: Does not bruise/bleed easily.   Psychiatric/Behavioral: Negative for confusion.     PATIENT HISTORY:    Past Medical History:   Diagnosis Date    Allergy     Colon polyp     Gastric nodule     GERD (gastroesophageal reflux disease)     Hypertension     mild, resolved     Osteoporosis        Past Surgical History:   Procedure Laterality Date    COLONOSCOPY N/A 2/23/2016    Procedure: COLONOSCOPY;  Surgeon: Christian Medina MD;  Location: [address];  Service: Endoscopy;  Laterality: N/A;    TUBAL LIGATION         Family History   Problem Relation Age of Onset    Cancer Mother 80     colon    No Known Problems Father        Social History     Social History    Marital status:      Spouse name: Augie    Number of children: 3    Years of education: N/A     Occupational History     [employer]      Social History Main Topics    Smoking status: Never Smoker    Smokeless tobacco: Never Used    Alcohol use No    Drug use: No    Sexual activity: Not on file     Other Topics Concern    Not on file     Social History Narrative       Allergies:  Sulfa (sulfonamide antibiotics) and Codeine    Medications:    Current Outpatient Prescriptions:     BIOTIN ORAL, Take by mouth once daily., Disp: , Rfl:     calcium citrate-vitamin D3 315-200 mg (CITRACAL+D) 315-200 mg-unit per tablet, Take 1 tablet by mouth 2 (two) times daily., Disp: , Rfl:     CRAN/VITC/MANNOSE/FOS/BROMELN (CYSTEX CRANBERRY ORAL), Take by mouth once daily., Disp: , Rfl:     fish oil-omega-3 fatty acids 300-1,000 mg capsule, Take 2 g by mouth once daily.  , Disp: , Rfl:     fluticasone (FLONASE) 50 mcg/actuation nasal spray, 2 sprays by Each Nare route once daily., Disp: 1 Bottle, Rfl: 0    loratadine-pseudoephedrine  mg (CLARITIN-D 24-HOUR)  mg per 24 hr  tablet, Take 1 tablet by mouth once daily., Disp: , Rfl:     oxybutynin (DITROPAN-XL) 5 MG TR24, Take 1 tablet (5 mg total) by mouth once daily., Disp: 30 tablet, Rfl: 11    PHYSICAL EXAMINATION:    Constitutional: She is oriented to person, place, and time. She appears well-developed and well-nourished.  She is in no apparent distress.    Neck: No tracheal deviation present.     Cardiovascular: Normal rate.      Pulmonary/Chest: Effort normal. No respiratory distress.     Abdominal:  She exhibits no distension.  There is no CVA tenderness.     Lymphadenopathy:          Right: No supraclavicular adenopathy present.        Left: No supraclavicular adenopathy present.     Neurological: She is alert and oriented to person, place, and time.     Skin: Skin is warm and dry.     Extremities: No pitting edema noted in lower extremities bilaterally    Psych: Cooperative with normal affect.    Bladder scan performed by Nurse Clark.  PVR 0ml    Physical Exam      LABS:    U/a: 1.020, pH 5, + leuks, tr protein, otherwise negative.      IMPRESSION:    Encounter Diagnoses   Name Primary?    Urgency of urination Yes    Urge incontinence of urine        PLAN:    Will refill oxybutynin.  Side effects including constipation, dry eyes, dry mouth discussed.    Discussed bladder irritants.  Patient informed that beverages containing caffeine can contribute to urgency and urge incontinence.  She was encouraged to continue to limit tea intake.   She was encouraged to limit fluid intake prior to bed.      Follow up in 3 months.      Nataliya Lambert PA-C

## 2017-03-01 NOTE — LETTER
March 1, 2017      Ilana Hercules MD  [address]  [place] [address]           [facility] - Urology 4th Floor  [address]  [place] [address]  Phone: [phone]          Patient: Kj Chávez   MR Number: [id number]   YOB: 1945   Date of Visit: 3/1/2017       Dear Dr. Ilana Hercules:    Thank you for referring Kj Chávez to me for evaluation. Attached you will find relevant portions of my assessment and plan of care.    If you have questions, please do not hesitate to call me. I look forward to following Kj Chávez along with you.    Sincerely,    Nataliya Lambert PA-C    Enclosure  CC:  No Recipients    If you would like to receive this communication electronically, please contact [email or web address] or [phone] to request more information on [employer] access.    For providers and/or their staff who would like to refer a patient to [facility], please contact us through our one-stop-shop provider referral line, [facility], at [phone].    If you feel you have received this communication in error or would no longer like to receive these types of communications, please e-mail [email or web address]

## 2017-03-02 NOTE — TELEPHONE ENCOUNTER
Please try calling her again. If she does not answer,please mail her a letter asking her to contact our office

## 2017-06-05 ENCOUNTER — OFFICE VISIT (OUTPATIENT)
Dept: UROLOGY | Facility: CLINIC | Age: 72
End: 2017-06-05
Payer: COMMERCIAL

## 2017-06-05 VITALS
DIASTOLIC BLOOD PRESSURE: 69 MMHG | SYSTOLIC BLOOD PRESSURE: 122 MMHG | BODY MASS INDEX: 25.51 KG/M2 | HEART RATE: 70 BPM | WEIGHT: 158.75 LBS | HEIGHT: 66 IN

## 2017-06-05 DIAGNOSIS — N39.41 URGE INCONTINENCE OF URINE: ICD-10-CM

## 2017-06-05 DIAGNOSIS — R39.15 URGENCY OF URINATION: Primary | ICD-10-CM

## 2017-06-05 PROCEDURE — 1159F MED LIST DOCD IN RCRD: CPT | Mod: S$GLB,,, | Performed by: PHYSICIAN ASSISTANT

## 2017-06-05 PROCEDURE — 99999 PR PBB SHADOW E&M-EST. PATIENT-LVL III: CPT | Mod: PBBFAC,,, | Performed by: PHYSICIAN ASSISTANT

## 2017-06-05 PROCEDURE — 99213 OFFICE O/P EST LOW 20 MIN: CPT | Mod: S$GLB,,, | Performed by: PHYSICIAN ASSISTANT

## 2017-06-05 PROCEDURE — 1126F AMNT PAIN NOTED NONE PRSNT: CPT | Mod: S$GLB,,, | Performed by: PHYSICIAN ASSISTANT

## 2017-06-05 NOTE — PROGRESS NOTES
"CHIEF COMPLAINT:    Mrs. Chávez is a 71 y.o. female presenting for urge incontinence and urgency.  PRESENTING ILLNESS:    Kj Chávez is a 71 y.o. female who presents for urge incontinence and urgency follow up.  She was last seen in clinic on 3/1/17 was instructed to restart oxybutynin.    Her urinary incontinence and urgency have improved.  She does not have any urinary complaints.  She denies dysuria.  When she drinks smith tea it "pushes" her to go to the restroom so she stays away from it.    She reports a good urinary stream and complete bladder emptying.    REVIEW OF SYSTEMS:    Constitutional: Negative for fever and chills.   HENT: Negative for hearing loss.   Eyes: Negative for visual disturbance.   Respiratory: Negative for shortness of breath.   Cardiovascular: Negative for chest pain.   Gastrointestinal: Negative for nausea, vomiting, and constipation.   Genitourinary:  Negative for frequency, nocturia, dysuria, hematuria, intermittency, hesitancy,  difficulty urinating, incomplete bladder emptying, and decreased urine volume.   Neurological: Negative for dizziness.   Hematological: Does bruise/bleed easily.   Psychiatric/Behavioral: Negative for confusion.     PATIENT HISTORY:    Past Medical History:   Diagnosis Date    Allergy     Colon polyp     Gastric nodule     GERD (gastroesophageal reflux disease)     Hypertension     mild, resolved     Osteoporosis        Past Surgical History:   Procedure Laterality Date    COLONOSCOPY N/A 2/23/2016    Procedure: COLONOSCOPY;  Surgeon: Christian Medina MD;  Location: [address];  Service: Endoscopy;  Laterality: N/A;    TUBAL LIGATION         Family History   Problem Relation Age of Onset    Cancer Mother 80     colon    No Known Problems Father        Social History     Social History    Marital status:      Spouse name: Augie    Number of children: 3    Years of education: N/A     Occupational History     [employer]      Social History Main Topics    Smoking status: Never Smoker    Smokeless tobacco: Never Used    Alcohol use No    Drug use: No    Sexual activity: Not on file     Other Topics Concern    Not on file     Social History Narrative    No narrative on file       Allergies:  Sulfa (sulfonamide antibiotics) and Codeine    Medications:    Current Outpatient Prescriptions:     BIOTIN ORAL, Take by mouth once daily., Disp: , Rfl:     calcium citrate-vitamin D3 315-200 mg (CITRACAL+D) 315-200 mg-unit per tablet, Take 1 tablet by mouth 2 (two) times daily., Disp: , Rfl:     CRAN/VITC/MANNOSE/FOS/BROMELN (CYSTEX CRANBERRY ORAL), Take by mouth once daily., Disp: , Rfl:     fish oil-omega-3 fatty acids 300-1,000 mg capsule, Take 2 g by mouth once daily.  , Disp: , Rfl:     fluticasone (FLONASE) 50 mcg/actuation nasal spray, 2 sprays by Each Nare route once daily., Disp: 1 Bottle, Rfl: 0    loratadine-pseudoephedrine  mg (CLARITIN-D 24-HOUR)  mg per 24 hr tablet, Take 1 tablet by mouth once daily., Disp: , Rfl:     oxybutynin (DITROPAN-XL) 5 MG TR24, Take 1 tablet (5 mg total) by mouth once daily., Disp: 30 tablet, Rfl: 11    PHYSICAL EXAMINATION:    Constitutional: She appears well-developed and well-nourished.  She is in no apparent distress.    Eyes: No scleral icterus noted bilaterally.  No discharge noted bilaterally.      Cardiovascular: Normal rate.  No pitting edema noted in lower extremities bilaterally    Pulmonary/Chest: Effort normal. No respiratory distress.     Abdominal:  She exhibits no distension.  There is no CVA tenderness.     Lymphadenopathy:          Right: No supraclavicular adenopathy present.        Left: No supraclavicular adenopathy present.     Neurological: She is alert and oriented to person, place, and time.     Skin: Skin is warm and dry.     Psych: Cooperative with normal affect.    Physical Exam      LABS:      IMPRESSION:    No diagnosis  found.    PLAN:    Continue to avoid bladder irritants.    Continue oxybutynin    Follow up in 1 year.      Nataliya Lambert PA-C

## 2017-07-17 DIAGNOSIS — Z86.018: Primary | ICD-10-CM

## 2017-08-03 ENCOUNTER — ANESTHESIA EVENT (OUTPATIENT)
Dept: ENDOSCOPY | Facility: HOSPITAL | Age: 72
End: 2017-08-03
Payer: COMMERCIAL

## 2017-08-03 ENCOUNTER — ANESTHESIA (OUTPATIENT)
Dept: ENDOSCOPY | Facility: HOSPITAL | Age: 72
End: 2017-08-03
Payer: COMMERCIAL

## 2017-08-03 ENCOUNTER — SURGERY (OUTPATIENT)
Age: 72
End: 2017-08-03

## 2017-08-03 ENCOUNTER — HOSPITAL ENCOUNTER (OUTPATIENT)
Facility: HOSPITAL | Age: 72
Discharge: HOME | End: 2017-08-03
Attending: INTERNAL MEDICINE | Admitting: INTERNAL MEDICINE
Payer: COMMERCIAL

## 2017-08-03 VITALS
RESPIRATION RATE: 27 BRPM | HEIGHT: 66 IN | RESPIRATION RATE: 12 BRPM | HEART RATE: 76 BPM | DIASTOLIC BLOOD PRESSURE: 78 MMHG | OXYGEN SATURATION: 95 % | BODY MASS INDEX: 25.39 KG/M2 | TEMPERATURE: 99 F | WEIGHT: 158 LBS | SYSTOLIC BLOOD PRESSURE: 129 MMHG

## 2017-08-03 DIAGNOSIS — Z86.018 HISTORY OF BENIGN GASTRIC TUMOR: Primary | ICD-10-CM

## 2017-08-03 PROCEDURE — 43235 EGD DIAGNOSTIC BRUSH WASH: CPT | Mod: ,,, | Performed by: INTERNAL MEDICINE

## 2017-08-03 PROCEDURE — D9220A PRA ANESTHESIA: Mod: CRNA,,, | Performed by: NURSE ANESTHETIST, CERTIFIED REGISTERED

## 2017-08-03 PROCEDURE — 37000008 HC ANESTHESIA 1ST 15 MINUTES: Performed by: INTERNAL MEDICINE

## 2017-08-03 PROCEDURE — 25000003 PHARM REV CODE 250: Performed by: NURSE ANESTHETIST, CERTIFIED REGISTERED

## 2017-08-03 PROCEDURE — 63600175 PHARM REV CODE 636 W HCPCS: Performed by: NURSE ANESTHETIST, CERTIFIED REGISTERED

## 2017-08-03 PROCEDURE — 43235 EGD DIAGNOSTIC BRUSH WASH: CPT | Performed by: INTERNAL MEDICINE

## 2017-08-03 PROCEDURE — D9220A PRA ANESTHESIA: Mod: ANES,,, | Performed by: ANESTHESIOLOGY

## 2017-08-03 PROCEDURE — 37000009 HC ANESTHESIA EA ADD 15 MINS: Performed by: INTERNAL MEDICINE

## 2017-08-03 PROCEDURE — 25000003 PHARM REV CODE 250: Performed by: INTERNAL MEDICINE

## 2017-08-03 RX ORDER — LIDOCAINE HCL/PF 100 MG/5ML
SYRINGE (ML) INTRAVENOUS
Status: DISCONTINUED | OUTPATIENT
Start: 2017-08-03 | End: 2017-08-03

## 2017-08-03 RX ORDER — GLYCOPYRROLATE 0.2 MG/ML
INJECTION INTRAMUSCULAR; INTRAVENOUS
Status: DISCONTINUED | OUTPATIENT
Start: 2017-08-03 | End: 2017-08-03

## 2017-08-03 RX ORDER — SODIUM CHLORIDE 9 MG/ML
INJECTION, SOLUTION INTRAVENOUS CONTINUOUS
Status: DISCONTINUED | OUTPATIENT
Start: 2017-08-03 | End: 2017-08-03 | Stop reason: HOSPADM

## 2017-08-03 RX ORDER — PROPOFOL 10 MG/ML
VIAL (ML) INTRAVENOUS
Status: DISCONTINUED | OUTPATIENT
Start: 2017-08-03 | End: 2017-08-03

## 2017-08-03 RX ADMIN — LIDOCAINE HYDROCHLORIDE 80 MG: 20 INJECTION, SOLUTION INTRAVENOUS at 01:08

## 2017-08-03 RX ADMIN — SODIUM CHLORIDE: 0.9 INJECTION, SOLUTION INTRAVENOUS at 01:08

## 2017-08-03 RX ADMIN — GLYCOPYRROLATE 0.1 MG: 0.2 INJECTION, SOLUTION INTRAMUSCULAR; INTRAVENOUS at 01:08

## 2017-08-03 RX ADMIN — PROPOFOL 70 MG: 10 INJECTION, EMULSION INTRAVENOUS at 01:08

## 2017-08-03 RX ADMIN — PROPOFOL 30 MG: 10 INJECTION, EMULSION INTRAVENOUS at 02:08

## 2017-08-03 RX ADMIN — PROPOFOL 20 MG: 10 INJECTION, EMULSION INTRAVENOUS at 02:08

## 2017-08-03 NOTE — DISCHARGE INSTRUCTIONS

## 2017-08-03 NOTE — ANESTHESIA PREPROCEDURE EVALUATION
08/03/2017  Kj Chávez is a 71 y.o., female.    Pre-op Assessment    I have reviewed the Patient Summary Reports.      I have reviewed the Medications.     Review of Systems  Anesthesia Hx:  No problems with previous Anesthesia    Cardiovascular:   Hypertension    Pulmonary:  Pulmonary Normal    Renal/:  Renal/ Normal     Hepatic/GI:   GERD        Physical Exam  General:  Well nourished    Airway/Jaw/Neck:  Airway Findings: Mouth Opening: Normal Tongue: Normal  General Airway Assessment: Adult  Mallampati: II  TM Distance: Normal, at least 6 cm       Chest/Lungs:  Chest/Lungs Findings: Clear to auscultation, Normal Respiratory Rate     Heart/Vascular:  Heart Findings: Rate: Normal  Rhythm: Regular Rhythm             Anesthesia Plan  Type of Anesthesia, risks & benefits discussed:  Anesthesia Type:  general  Patient's Preference:   Intra-op Monitoring Plan:   Intra-op Monitoring Plan Comments:   Post Op Pain Control Plan:   Post Op Pain Control Plan Comments:   Induction:   IV  Beta Blocker:  Patient is not currently on a Beta-Blocker (No further documentation required).       Informed Consent: Patient understands risks and agrees with Anesthesia plan.  Questions answered. Anesthesia consent signed with patient.  ASA Score: 2     Day of Surgery Review of History & Physical:            Ready For Surgery From Anesthesia Perspective.     Patient Active Problem List   Diagnosis    Alpha thalassemia trait    History of colon polyps    Aortic atherosclerosis

## 2017-08-03 NOTE — ANESTHESIA POSTPROCEDURE EVALUATION
"Anesthesia Post Evaluation    Patient: Kj Chávez    Procedure(s) Performed: Procedure(s) (LRB):  ESOPHAGOGASTRODUODENOSCOPY (EGD) (N/A)    Final Anesthesia Type: general  Patient location during evaluation: GI PACU  Patient participation: Yes- Able to Participate  Level of consciousness: awake and alert, oriented and awake  Post-procedure vital signs: reviewed and stable  Pain management: adequate  Airway patency: patent  PONV status at discharge: No PONV  Anesthetic complications: no      Cardiovascular status: blood pressure returned to baseline and hemodynamically stable  Respiratory status: unassisted, spontaneous ventilation and room air  Hydration status: euvolemic  Follow-up not needed.        Visit Vitals  /78 (BP Location: Left arm, Patient Position: Sitting, BP Method: Automatic)   Pulse 76   Temp 37.1 °C (98.7 °F) (Oral)   Resp 12   Ht 5' 6" (1.676 m)   Wt 71.7 kg (158 lb)   SpO2 95%   Breastfeeding? No   BMI 25.50 kg/m²       Pain/Britni Score: Pain Assessment Performed: Yes (8/3/2017  2:41 PM)  Presence of Pain: denies (8/3/2017  2:41 PM)  Pain Rating Prior to Med Admin: 0 (8/3/2017  2:41 PM)  Pain Rating Post Med Admin: 0 (8/3/2017  2:41 PM)  Britni Score: 10 (8/3/2017  2:41 PM)      "

## 2017-08-03 NOTE — H&P
[facility]  History & Physical    Subjective:      Chief Complaint/Reason for Admission: gastric mass    Kj Chávez is a 71 y.o. female.    Past Medical History:   Diagnosis Date    Allergy     Colon polyp     Gastric nodule     GERD (gastroesophageal reflux disease)     Hypertension     mild, resolved     Osteoporosis      Past Surgical History:   Procedure Laterality Date    COLONOSCOPY N/A 2/23/2016    Procedure: COLONOSCOPY;  Surgeon: Christian Medina MD;  Location: [address]);  Service: Endoscopy;  Laterality: N/A;    TUBAL LIGATION       Family History   Problem Relation Age of Onset    Cancer Mother 80     colon    No Known Problems Father      Social History   Substance Use Topics    Smoking status: Never Smoker    Smokeless tobacco: Never Used    Alcohol use No       PTA Medications   Medication Sig    fish oil-omega-3 fatty acids 300-1,000 mg capsule Take 2 g by mouth once daily.      ibuprofen (ADVIL,MOTRIN) 100 MG tablet Take 100 mg by mouth every 6 (six) hours as needed for Temperature greater than.    loratadine-pseudoephedrine  mg (CLARITIN-D 24-HOUR)  mg per 24 hr tablet Take 1 tablet by mouth once daily.    BIOTIN ORAL Take by mouth once daily.    calcium citrate-vitamin D3 315-200 mg (CITRACAL+D) 315-200 mg-unit per tablet Take 1 tablet by mouth 2 (two) times daily.    CRAN/VITC/MANNOSE/FOS/BROMELN (CYSTEX CRANBERRY ORAL) Take by mouth once daily.    fluticasone (FLONASE) 50 mcg/actuation nasal spray 2 sprays by Each Nare route once daily.    oxybutynin (DITROPAN-XL) 5 MG TR24 Take 1 tablet (5 mg total) by mouth once daily.     Review of patient's allergies indicates:   Allergen Reactions    Sulfa (sulfonamide antibiotics) Swelling    Codeine Anxiety        Review of Systems   Respiratory: Negative.    Cardiovascular: Negative.        Objective:      Vital Signs (Most Recent)  Temp: 98.1 °F (36.7 °C) (08/03/17 1335)  Pulse: 62  (08/03/17 1335)  Resp: 18 (08/03/17 1335)  BP: (!) 165/84 (08/03/17 1335)  SpO2: 95 % (08/03/17 1335)    Vital Signs Range (Last 24H):  Temp:  [98.1 °F (36.7 °C)]   Pulse:  [62]   Resp:  [18-22]   BP: (165)/(84)   SpO2:  [95 %]     Physical Exam   Neck: Normal range of motion.   Cardiovascular: Normal rate and regular rhythm.    Pulmonary/Chest: Effort normal and breath sounds normal.   Neurological: She is alert.       Data Review:     ECG: .     Assessment:      There are no hospital problems to display for this patient.      Plan:    Indication for procedure:gastric mass    ASA:II  Airway normal  Malampati class:per anes    Personal and family history negative for anesthesia problems    Plan:egd  Anesthesia plan: general

## 2017-08-10 ENCOUNTER — TELEPHONE (OUTPATIENT)
Dept: ENDOSCOPY | Facility: HOSPITAL | Age: 72
End: 2017-08-10

## 2017-09-21 ENCOUNTER — OFFICE VISIT (OUTPATIENT)
Dept: INTERNAL MEDICINE | Facility: CLINIC | Age: 72
End: 2017-09-21
Payer: COMMERCIAL

## 2017-09-21 ENCOUNTER — OFFICE VISIT (OUTPATIENT)
Dept: OPTOMETRY | Facility: CLINIC | Age: 72
End: 2017-09-21
Payer: COMMERCIAL

## 2017-09-21 VITALS
HEART RATE: 70 BPM | SYSTOLIC BLOOD PRESSURE: 134 MMHG | HEIGHT: 66 IN | BODY MASS INDEX: 25.71 KG/M2 | WEIGHT: 160 LBS | DIASTOLIC BLOOD PRESSURE: 88 MMHG | OXYGEN SATURATION: 96 %

## 2017-09-21 DIAGNOSIS — R30.0 DYSURIA: ICD-10-CM

## 2017-09-21 DIAGNOSIS — H57.12 EYE PAIN, LEFT: ICD-10-CM

## 2017-09-21 DIAGNOSIS — H10.812 PINGUECULITIS OF LEFT EYE: Primary | ICD-10-CM

## 2017-09-21 DIAGNOSIS — B37.31 YEAST VAGINITIS: ICD-10-CM

## 2017-09-21 DIAGNOSIS — K57.92 DIVERTICULITIS OF INTESTINE WITHOUT PERFORATION OR ABSCESS, UNSPECIFIED BLEEDING STATUS, UNSPECIFIED PART OF INTESTINAL TRACT: ICD-10-CM

## 2017-09-21 DIAGNOSIS — R35.0 URINARY FREQUENCY: Primary | ICD-10-CM

## 2017-09-21 DIAGNOSIS — Z12.31 ENCOUNTER FOR SCREENING MAMMOGRAM FOR BREAST CANCER: ICD-10-CM

## 2017-09-21 LAB
BILIRUB SERPL-MCNC: ABNORMAL MG/DL
BILIRUB UR QL STRIP: NEGATIVE
BLOOD, POC UA: ABNORMAL
CLARITY UR REFRACT.AUTO: CLEAR
COLOR UR AUTO: YELLOW
GLUCOSE UR QL STRIP: NEGATIVE
GLUCOSE UR QL STRIP: NORMAL
HGB UR QL STRIP: NEGATIVE
KETONES UR QL STRIP: ABNORMAL
KETONES UR QL STRIP: NEGATIVE
LEUKOCYTE ESTERASE UR QL STRIP: NEGATIVE
LEUKOCYTE ESTERASE URINE, POC: ABNORMAL
NITRITE UR QL STRIP: NEGATIVE
NITRITE, POC UA: ABNORMAL
PH UR STRIP: 6 [PH] (ref 5–8)
PH, POC UA: 6
PROT UR QL STRIP: NEGATIVE
PROTEIN, POC: ABNORMAL
SP GR UR STRIP: 1.02 (ref 1–1.03)
SPECIFIC GRAVITY, POC UA: 1.01
URN SPEC COLLECT METH UR: NORMAL
UROBILINOGEN UR STRIP-ACNC: NEGATIVE EU/DL
UROBILINOGEN, POC UA: NORMAL

## 2017-09-21 PROCEDURE — 1125F AMNT PAIN NOTED PAIN PRSNT: CPT | Mod: S$GLB,,, | Performed by: INTERNAL MEDICINE

## 2017-09-21 PROCEDURE — 3008F BODY MASS INDEX DOCD: CPT | Mod: S$GLB,,, | Performed by: INTERNAL MEDICINE

## 2017-09-21 PROCEDURE — 81003 URINALYSIS AUTO W/O SCOPE: CPT

## 2017-09-21 PROCEDURE — 3079F DIAST BP 80-89 MM HG: CPT | Mod: S$GLB,,, | Performed by: INTERNAL MEDICINE

## 2017-09-21 PROCEDURE — 1159F MED LIST DOCD IN RCRD: CPT | Mod: S$GLB,,, | Performed by: INTERNAL MEDICINE

## 2017-09-21 PROCEDURE — 99999 PR PBB SHADOW E&M-EST. PATIENT-LVL IV: CPT | Mod: PBBFAC,,, | Performed by: INTERNAL MEDICINE

## 2017-09-21 PROCEDURE — 3075F SYST BP GE 130 - 139MM HG: CPT | Mod: S$GLB,,, | Performed by: INTERNAL MEDICINE

## 2017-09-21 PROCEDURE — 87086 URINE CULTURE/COLONY COUNT: CPT

## 2017-09-21 PROCEDURE — 99999 PR PBB SHADOW E&M-EST. PATIENT-LVL II: CPT | Mod: PBBFAC,,, | Performed by: OPTOMETRIST

## 2017-09-21 PROCEDURE — 92002 INTRM OPH EXAM NEW PATIENT: CPT | Mod: S$GLB,,, | Performed by: OPTOMETRIST

## 2017-09-21 PROCEDURE — 81000 URINALYSIS NONAUTO W/SCOPE: CPT | Mod: S$GLB,,, | Performed by: INTERNAL MEDICINE

## 2017-09-21 PROCEDURE — 99214 OFFICE O/P EST MOD 30 MIN: CPT | Mod: 25,S$GLB,, | Performed by: INTERNAL MEDICINE

## 2017-09-21 RX ORDER — FLUCONAZOLE 150 MG/1
150 TABLET ORAL DAILY
Qty: 1 TABLET | Refills: 0 | Status: SHIPPED | OUTPATIENT
Start: 2017-09-21 | End: 2017-09-22

## 2017-09-21 RX ORDER — METRONIDAZOLE 500 MG/1
500 TABLET ORAL 3 TIMES DAILY
Qty: 30 TABLET | Refills: 0 | Status: SHIPPED | OUTPATIENT
Start: 2017-09-21 | End: 2017-10-01

## 2017-09-21 RX ORDER — FLUOROMETHOLONE 1 MG/ML
1 SUSPENSION/ DROPS OPHTHALMIC 4 TIMES DAILY
Qty: 5 ML | Refills: 0 | Status: SHIPPED | OUTPATIENT
Start: 2017-09-21 | End: 2017-10-02

## 2017-09-21 RX ORDER — CIPROFLOXACIN 500 MG/1
500 TABLET ORAL 2 TIMES DAILY
Qty: 20 TABLET | Refills: 0 | Status: SHIPPED | OUTPATIENT
Start: 2017-09-21 | End: 2017-10-01

## 2017-09-21 NOTE — PROGRESS NOTES
"HPI     Concerns About Ocular Health    Additional comments: "Bubble" on the left eye for the last two weeks, or   so.  Thinks she may have scratched the eye while gardening.  Wears OTC reading glasses.            Comments   Patient's age: 71 y.o. AA female  Occupation: Teacher   Approximate date of last eye examination:  7 yrs ago   Name of last eye doctor seen:   City/State:   Wears glasses? Yes      If yes, wears  Full-time or part-time?  Part time   Present glasses are: Bifocal, SV Distance, SV Reading?  otc readers 1.75  Approximate age of present glasses:    Got new glasses following last exam, or subsequently?:     Any problem with [facility] with glasses?  No  Wears CLs?:  No           If yes:              Type of CL worn:                Wears full-time or part-time:                 Sleeps with contact lenses:                 CL Solution used:                 How often replace CLs:                Any problem with [facility] with CLs?                Has patient read and signed the contact lens fee information   document?   Headaches?  No  Eye pain/discomfort?  OS irritated for the past week                                                                                     Flashes?  No  Floaters?  No  Diplopia/Double vision?  No  Patient's Ocular History:         Any eye surgeries? No         Any eye injury?  No         Any treatment for eye disease?  No  Family history of eye disease?  No  Significant patient medical history:         1. Diabetes?  No       If yes, IDDM or NIDDM?    2. HBP?  No              3. Other (describe):     ! OTC eyedrops currently using:  No   ! Prescription eye meds currently using:  No   ! Any history of allergy/adverse reaction to any eye meds used   previously?  No    ! Any history of allergy/adverse reaction to eyedrops used during prior   eye exam(s)? No    ! Any history of allergy/adverse reaction to Novacaine or similar meds?   No   ! Any history of allergy/adverse reaction to Epinephrine or " similar meds?   No    ! Patient okay with use of anesthetic eyedrops to check eye pressure?    yes        ! Patient okay with use of eyedrops to dilate pupils today?  yes   !  Allergies/Medications/Medical History/Family History reviewed today?    Yes       PD =     Desired reading distance =    Approx computer distance =                                                                  Last edited by Amando Richardson, OD on 9/21/2017 10:53 AM. (History)            Assessment /Plan     For exam results, see Encounter Report.    1. Pingueculitis of left eye  fluorometholone 0.1% (FML) 0.1 % DrpS   2. Eye pain, left                    Recent symptoms of ocular redness and discomfort in the left eye, secondary to pingueculitis in the left eye (nasally).  No other problem noted.  Discussed with Ms. Chávez.  In view of her report that her level of discomfort was reported to be 8 on scale of 0 to 10, I suggest treat left eye with topical steroid eyedrop (FML ophthalmic suspension used four times per day), and artificial tears used as often as desired during the day.  Rx for FML drops sent to [facility].  Return for recheck in seven to eight days - or prior, if any worsening of signs/symptoms in the interim.

## 2017-09-21 NOTE — PROGRESS NOTES
"Subjective:       Patient ID: Kj Chávez is a 71 y.o. female.    Chief Complaint: Urinary Tract Infection (itching/burning while urinating & urinary frequency. possible UTI.)    HPI   72 yo F here for urgent eval of itching/burning when urinating and urinary frequency.   POCT urine shows trace protein and bilirubin. No blood, leuk or nitrites.    Also reports vaginal discharge last Thursday. Milky to clear.   Itching present for two weeks. No new sexual partners.     Also with loose stools and left lower quadrant pain for 2 weeks. She has had diverticulitis previously and this feels similar.   Review of Systems   Constitutional: Negative for fever.   Respiratory: Negative for shortness of breath.    Cardiovascular: Negative for chest pain.   Gastrointestinal: Positive for abdominal pain and diarrhea.   Genitourinary: Positive for vaginal discharge.   Musculoskeletal: Negative.    Skin: Negative.        Objective:   /88 (BP Location: Right arm, Patient Position: Sitting, BP Method: Medium (Manual))   Pulse 70   Ht 5' 6" (1.676 m)   Wt 72.6 kg (160 lb)   SpO2 96%   BMI 25.82 kg/m²      Physical Exam   Constitutional: She is oriented to person, place, and time. She appears well-developed and well-nourished. No distress.   HENT:   Head: Normocephalic and atraumatic.   Cardiovascular: Normal rate and regular rhythm.    Pulmonary/Chest: Effort normal. No respiratory distress. She has no wheezes. She has no rales.   Abdominal:   Left lower quadrant ttp   Neurological: She is alert and oriented to person, place, and time.   Skin: Skin is warm and dry. She is not diaphoretic.   Psychiatric: She has a normal mood and affect. Her behavior is normal.       Assessment:       1. Urinary frequency    2. Dysuria    3. Diverticulitis of intestine without perforation or abscess, unspecified bleeding status, unspecified part of intestinal tract    4. Yeast vaginitis    5. Encounter for screening mammogram for breast " cancer        Plan:       Kj was seen today for urinary tract infection.    Diagnoses and all orders for this visit:    Urinary frequency  Dysuria  -     POCT Urinalysis - does not appear to be UTI  -     Urinalysis  -     Urine culture    Diverticulitis of intestine without perforation or abscess, unspecified bleeding status, unspecified part of intestinal tract  -     ciprofloxacin HCl (CIPRO) 500 MG tablet; Take 1 tablet (500 mg total) by mouth 2 (two) times daily.  -     metronidazole (FLAGYL) 500 MG tablet; Take 1 tablet (500 mg total) by mouth 3 (three) times daily.  Alert clinic if fails to improve, fevers.     Yeast vaginitis  -     fluconazole (DIFLUCAN) 150 MG Tab; Take 1 tablet (150 mg total) by mouth once daily.    Encounter for screening mammogram for breast cancer  -     Mammo Digital Screening Bilateral With CAD; Future

## 2017-09-21 NOTE — PATIENT INSTRUCTIONS
Recent symptoms of ocular redness and discomfort in the left eye, secondary to pingueculitis in the left eye (nasally).  No other problem noted.  Discussed with MsMarco Kyler.  In view of her report that her level of discomfort was reported to be 8 on scale of 0 to 10, I suggest treat left eye with topical steroid eyedrop (FML ophthalmic suspension used four times per day), and artificial tears used as often as desired during the day.  Rx for FML drops sent to [facility].  Return for recheck in seven to eight days - or prior, if any worsening of signs/symptoms in the interim.

## 2017-09-22 ENCOUNTER — TELEPHONE (OUTPATIENT)
Dept: INTERNAL MEDICINE | Facility: CLINIC | Age: 72
End: 2017-09-22

## 2017-09-22 NOTE — TELEPHONE ENCOUNTER
Please call patient.  Your urine is negative for infection.  Please continue the antibiotics that were prescribed for the diverticulitis and the fluconazole for yeast infection.

## 2017-09-23 LAB
BACTERIA UR CULT: NORMAL
BACTERIA UR CULT: NORMAL

## 2017-09-28 ENCOUNTER — OFFICE VISIT (OUTPATIENT)
Dept: OPTOMETRY | Facility: CLINIC | Age: 72
End: 2017-09-28
Payer: COMMERCIAL

## 2017-09-28 DIAGNOSIS — H10.812 PINGUECULITIS OF LEFT EYE: Primary | ICD-10-CM

## 2017-09-28 PROCEDURE — 99999 PR PBB SHADOW E&M-EST. PATIENT-LVL II: CPT | Mod: PBBFAC,,, | Performed by: OPTOMETRIST

## 2017-09-28 PROCEDURE — 99499 UNLISTED E&M SERVICE: CPT | Mod: S$GLB,,, | Performed by: OPTOMETRIST

## 2017-09-28 NOTE — PROGRESS NOTES
HPI     Concerns About Ocular Health    Additional comments: 1 week f/u            Comments   Patient in for progress check.  Patient is in for a 1 week f/u   Being followed for (diagnosis): Pingueculitis OS     Date last seen:  09/21/17    Doctor last seen:  Dr. Richardson     Prescribed eye medications(s) using:  FML 1 drop in the OS 4x a day     OTC eye medication(s) using:  None     Signs/symptoms of condition resolved/better/stable/worse?:  Better    Allergies/Medications reviewed today?  Yes              Last edited by Sanjeev Darby on 9/28/2017  1:58 PM. (History)            Assessment /Plan     For exam results, see Encounter Report.    1. Pingueculitis of left eye                Recent diagnosis of  pingueculitis in the left eye (nasally).  No other problem noted.  Sings and symptoms much improved with the use of FML ophthalmic suspension in the left eye four times per day, and artiicial tears.  Now asymptomatic.  Taper use of FML drops in the left eye over the next nine days.  Okay to use artificial tears as needed.  Return for recheck if any recurrence.    Otherwise, return for general eye examination and refraction when appropriate.

## 2017-09-28 NOTE — PATIENT INSTRUCTIONS
Recent diagnosis of  pingueculitis in the left eye (nasally).  No other problem noted.  Sings and symptoms much improved with the use of FML ophthalmic suspension in the left eye four times per day, and artiicial tears.  Now asymptomatic.  Taper use of FML drops in the left eye over the next nine days.  Okay to use artificial tears as needed.  Return for recheck if any recurrence.    Otherwise, return for general eye examination and refraction when appropriate.

## 2017-10-02 ENCOUNTER — TELEPHONE (OUTPATIENT)
Dept: INTERNAL MEDICINE | Facility: CLINIC | Age: 72
End: 2017-10-02

## 2017-10-02 ENCOUNTER — OFFICE VISIT (OUTPATIENT)
Dept: INTERNAL MEDICINE | Facility: CLINIC | Age: 72
End: 2017-10-02
Payer: COMMERCIAL

## 2017-10-02 VITALS
TEMPERATURE: 100 F | SYSTOLIC BLOOD PRESSURE: 124 MMHG | BODY MASS INDEX: 25.79 KG/M2 | WEIGHT: 160.5 LBS | HEART RATE: 76 BPM | HEIGHT: 66 IN | DIASTOLIC BLOOD PRESSURE: 84 MMHG

## 2017-10-02 DIAGNOSIS — J06.9 UPPER RESPIRATORY TRACT INFECTION, UNSPECIFIED TYPE: Primary | ICD-10-CM

## 2017-10-02 DIAGNOSIS — J02.9 SORE THROAT: ICD-10-CM

## 2017-10-02 LAB
DEPRECATED S PYO AG THROAT QL EIA: NEGATIVE
FLUAV AG SPEC QL IA: POSITIVE
FLUBV AG SPEC QL IA: NEGATIVE
SPECIMEN SOURCE: ABNORMAL

## 2017-10-02 PROCEDURE — 3008F BODY MASS INDEX DOCD: CPT | Mod: S$GLB,,, | Performed by: INTERNAL MEDICINE

## 2017-10-02 PROCEDURE — 87880 STREP A ASSAY W/OPTIC: CPT

## 2017-10-02 PROCEDURE — 87081 CULTURE SCREEN ONLY: CPT

## 2017-10-02 PROCEDURE — 99213 OFFICE O/P EST LOW 20 MIN: CPT | Mod: S$GLB,,, | Performed by: INTERNAL MEDICINE

## 2017-10-02 PROCEDURE — 87400 INFLUENZA A/B EACH AG IA: CPT

## 2017-10-02 PROCEDURE — 1159F MED LIST DOCD IN RCRD: CPT | Mod: S$GLB,,, | Performed by: INTERNAL MEDICINE

## 2017-10-02 PROCEDURE — 3079F DIAST BP 80-89 MM HG: CPT | Mod: S$GLB,,, | Performed by: INTERNAL MEDICINE

## 2017-10-02 PROCEDURE — 3074F SYST BP LT 130 MM HG: CPT | Mod: S$GLB,,, | Performed by: INTERNAL MEDICINE

## 2017-10-02 PROCEDURE — 99999 PR PBB SHADOW E&M-EST. PATIENT-LVL III: CPT | Mod: PBBFAC,,, | Performed by: INTERNAL MEDICINE

## 2017-10-02 RX ORDER — OSELTAMIVIR PHOSPHATE 75 MG/1
75 CAPSULE ORAL 2 TIMES DAILY
Qty: 10 CAPSULE | Refills: 0 | Status: SHIPPED | OUTPATIENT
Start: 2017-10-02 | End: 2017-10-07

## 2017-10-02 RX ORDER — PROMETHAZINE HYDROCHLORIDE AND DEXTROMETHORPHAN HYDROBROMIDE 6.25; 15 MG/5ML; MG/5ML
5 SYRUP ORAL NIGHTLY PRN
Qty: 180 ML | Refills: 0 | Status: SHIPPED | OUTPATIENT
Start: 2017-10-02 | End: 2017-12-18

## 2017-10-02 NOTE — TELEPHONE ENCOUNTER
----- Message from Garima Perez MD sent at 10/2/2017  1:16 PM CDT -----  Call and notify pt her flu swab was positive  She has influenza A , a rx for tamiflu was sent to her pharmcy    To take twice daily for 5 days

## 2017-10-04 LAB — BACTERIA THROAT CULT: NORMAL

## 2017-10-06 ENCOUNTER — HOSPITAL ENCOUNTER (EMERGENCY)
Facility: HOSPITAL | Age: 72
Discharge: HOME | End: 2017-10-07
Attending: EMERGENCY MEDICINE
Payer: COMMERCIAL

## 2017-10-06 ENCOUNTER — OFFICE VISIT (OUTPATIENT)
Dept: INTERNAL MEDICINE | Facility: CLINIC | Age: 72
End: 2017-10-06
Payer: COMMERCIAL

## 2017-10-06 VITALS
HEIGHT: 66 IN | HEART RATE: 113 BPM | WEIGHT: 156.5 LBS | BODY MASS INDEX: 25.15 KG/M2 | OXYGEN SATURATION: 99 % | DIASTOLIC BLOOD PRESSURE: 92 MMHG | TEMPERATURE: 99 F | SYSTOLIC BLOOD PRESSURE: 150 MMHG

## 2017-10-06 DIAGNOSIS — R06.02 SOB (SHORTNESS OF BREATH): Primary | ICD-10-CM

## 2017-10-06 DIAGNOSIS — J10.1 INFLUENZA A: Primary | ICD-10-CM

## 2017-10-06 LAB
ALBUMIN SERPL BCP-MCNC: 3.7 G/DL
ALP SERPL-CCNC: 91 U/L
ALT SERPL W/O P-5'-P-CCNC: 18 U/L
ANION GAP SERPL CALC-SCNC: 5 MMOL/L
AST SERPL-CCNC: 14 U/L
BASOPHILS # BLD AUTO: 0.03 K/UL
BASOPHILS NFR BLD: 0.7 %
BILIRUB SERPL-MCNC: 0.4 MG/DL
BILIRUB UR QL STRIP: NEGATIVE
BUN SERPL-MCNC: 9 MG/DL
CALCIUM SERPL-MCNC: 10.6 MG/DL
CHLORIDE SERPL-SCNC: 99 MMOL/L
CLARITY UR REFRACT.AUTO: CLEAR
CO2 SERPL-SCNC: 33 MMOL/L
COLOR UR AUTO: YELLOW
CREAT SERPL-MCNC: 0.7 MG/DL
DIFFERENTIAL METHOD: ABNORMAL
EOSINOPHIL # BLD AUTO: 0.1 K/UL
EOSINOPHIL NFR BLD: 1.2 %
ERYTHROCYTE [DISTWIDTH] IN BLOOD BY AUTOMATED COUNT: 14.2 %
EST. GFR  (AFRICAN AMERICAN): >60 ML/MIN/1.73 M^2
EST. GFR  (NON AFRICAN AMERICAN): >60 ML/MIN/1.73 M^2
FLUAV AG SPEC QL IA: NEGATIVE
FLUBV AG SPEC QL IA: NEGATIVE
GLUCOSE SERPL-MCNC: 97 MG/DL
GLUCOSE UR QL STRIP: NEGATIVE
HCT VFR BLD AUTO: 41.6 %
HGB BLD-MCNC: 13.2 G/DL
HGB UR QL STRIP: NEGATIVE
KETONES UR QL STRIP: NEGATIVE
LEUKOCYTE ESTERASE UR QL STRIP: NEGATIVE
LYMPHOCYTES # BLD AUTO: 2.3 K/UL
LYMPHOCYTES NFR BLD: 56 %
MCH RBC QN AUTO: 22.2 PG
MCHC RBC AUTO-ENTMCNC: 31.7 G/DL
MCV RBC AUTO: 70 FL
MONOCYTES # BLD AUTO: 0.6 K/UL
MONOCYTES NFR BLD: 13.9 %
NEUTROPHILS # BLD AUTO: 1.1 K/UL
NEUTROPHILS NFR BLD: 28.2 %
NITRITE UR QL STRIP: NEGATIVE
OVALOCYTES BLD QL SMEAR: ABNORMAL
PH UR STRIP: 6 [PH] (ref 5–8)
PLATELET # BLD AUTO: 269 K/UL
PLATELET BLD QL SMEAR: ABNORMAL
PMV BLD AUTO: ABNORMAL FL
POIKILOCYTOSIS BLD QL SMEAR: SLIGHT
POTASSIUM SERPL-SCNC: 4.4 MMOL/L
PROT SERPL-MCNC: 7.7 G/DL
PROT UR QL STRIP: NEGATIVE
RBC # BLD AUTO: 5.94 M/UL
SODIUM SERPL-SCNC: 137 MMOL/L
SP GR UR STRIP: 1.01 (ref 1–1.03)
SPECIMEN SOURCE: NORMAL
TARGETS BLD QL SMEAR: ABNORMAL
URN SPEC COLLECT METH UR: NORMAL
UROBILINOGEN UR STRIP-ACNC: NEGATIVE EU/DL
WBC # BLD AUTO: 4.02 K/UL

## 2017-10-06 PROCEDURE — 25000003 PHARM REV CODE 250: Performed by: PHYSICIAN ASSISTANT

## 2017-10-06 PROCEDURE — 99999 PR PBB SHADOW E&M-EST. PATIENT-LVL III: CPT | Mod: PBBFAC,,, | Performed by: INTERNAL MEDICINE

## 2017-10-06 PROCEDURE — 25500020 PHARM REV CODE 255: Performed by: EMERGENCY MEDICINE

## 2017-10-06 PROCEDURE — 63600175 PHARM REV CODE 636 W HCPCS: Performed by: PHYSICIAN ASSISTANT

## 2017-10-06 PROCEDURE — 96374 THER/PROPH/DIAG INJ IV PUSH: CPT

## 2017-10-06 PROCEDURE — 85025 COMPLETE CBC W/AUTO DIFF WBC: CPT

## 2017-10-06 PROCEDURE — 99213 OFFICE O/P EST LOW 20 MIN: CPT | Mod: S$GLB,,, | Performed by: INTERNAL MEDICINE

## 2017-10-06 PROCEDURE — 80053 COMPREHEN METABOLIC PANEL: CPT

## 2017-10-06 PROCEDURE — 99284 EMERGENCY DEPT VISIT MOD MDM: CPT | Mod: 25

## 2017-10-06 PROCEDURE — 96361 HYDRATE IV INFUSION ADD-ON: CPT

## 2017-10-06 PROCEDURE — 99284 EMERGENCY DEPT VISIT MOD MDM: CPT | Mod: ,,, | Performed by: EMERGENCY MEDICINE

## 2017-10-06 PROCEDURE — 81003 URINALYSIS AUTO W/O SCOPE: CPT

## 2017-10-06 PROCEDURE — 87400 INFLUENZA A/B EACH AG IA: CPT

## 2017-10-06 RX ORDER — GUAIFENESIN 600 MG/1
600 TABLET, EXTENDED RELEASE ORAL
Status: COMPLETED | OUTPATIENT
Start: 2017-10-06 | End: 2017-10-06

## 2017-10-06 RX ORDER — ACETAMINOPHEN 325 MG/1
650 TABLET ORAL
Status: COMPLETED | OUTPATIENT
Start: 2017-10-06 | End: 2017-10-06

## 2017-10-06 RX ORDER — KETOROLAC TROMETHAMINE 30 MG/ML
10 INJECTION, SOLUTION INTRAMUSCULAR; INTRAVENOUS
Status: COMPLETED | OUTPATIENT
Start: 2017-10-06 | End: 2017-10-06

## 2017-10-06 RX ORDER — CETIRIZINE HYDROCHLORIDE 5 MG/1
10 TABLET ORAL
Status: COMPLETED | OUTPATIENT
Start: 2017-10-06 | End: 2017-10-06

## 2017-10-06 RX ADMIN — KETOROLAC TROMETHAMINE 10 MG: 30 INJECTION, SOLUTION INTRAMUSCULAR at 06:10

## 2017-10-06 RX ADMIN — ACETAMINOPHEN 650 MG: 325 TABLET ORAL at 11:10

## 2017-10-06 RX ADMIN — SODIUM CHLORIDE 1000 ML: 0.9 INJECTION, SOLUTION INTRAVENOUS at 06:10

## 2017-10-06 RX ADMIN — IOHEXOL 75 ML: 350 INJECTION, SOLUTION INTRAVENOUS at 10:10

## 2017-10-06 RX ADMIN — CETIRIZINE HYDROCHLORIDE 10 MG: 5 TABLET ORAL at 11:10

## 2017-10-06 RX ADMIN — PANTOPRAZOLE SODIUM 600 MG: 40 TABLET, DELAYED RELEASE ORAL at 11:10

## 2017-10-06 NOTE — ED PROVIDER NOTES
Encounter Date: 10/6/2017    SCRIBE #1 NOTE: I, Inga De La Garza, am scribing for, and in the presence of,  Yesenia Betts PA-C. I have scribed the entire note.   SCRIBE #2 NOTE: I, Lourdes Tracy, am scribing for, and in the presence of,  Dr. Melissa. I have scribed the following portions of the note - the APC attestation.     History     Chief Complaint   Patient presents with    Influenza     sent from IM clinic with mask on, ' flu and dehydration'     Time patient was seen by the provider: 5:32 PM      This is a 71 y.o. female with a PMH of HTN, GERD, and diverticulitis who presents to the ED with a complaint of influenza with worsening symptoms. She began with fever and body aches 5 days ago. She was seen by her PCP and tested positive for influenza A, is currently taking Tamiflu. She endorses a nonproductive cough, wheezing, generalized body aches and headache. Patient has persistent fever tmax 101F. She also endorses mild SOB, decreased appetite, abdominal pain described as cramping and soreness. She was recently treated for diverticulitis 2-3 weeks ago with a course of cipro and flagyl. She states that her diarrhea is persistent and frequent, up to 10 episodes daily.  She denies any recent travel, nasal drainage, sore throat, chest pain, vomiting, dysuria, melena or hematochezia. Colonoscopy in 2016 revealed two polyps and sigmoid diverticulosis.       The history is provided by the patient and medical records.     Review of patient's allergies indicates:   Allergen Reactions    Sulfa (sulfonamide antibiotics) Swelling    Codeine Anxiety     Past Medical History:   Diagnosis Date    Allergy     Colon polyp     Gastric nodule     GERD (gastroesophageal reflux disease)     Hypertension     mild, resolved     Osteoporosis      Past Surgical History:   Procedure Laterality Date    COLONOSCOPY N/A 2/23/2016    Procedure: COLONOSCOPY;  Surgeon: Christian Medina MD;  Location: [facility] ([address] [place] [address]);  Service:  Endoscopy;  Laterality: N/A;    TUBAL LIGATION       Family History   Problem Relation Age of Onset    Cancer Mother 80     colon    No Known Problems Father      Social History   Substance Use Topics    Smoking status: Never Smoker    Smokeless tobacco: Never Used    Alcohol use No     Review of Systems   Constitutional: Positive for fatigue and fever. Negative for appetite change.   HENT: Negative for nosebleeds, rhinorrhea and sore throat.    Eyes: Negative for visual disturbance.   Respiratory: Positive for cough, shortness of breath and wheezing.    Cardiovascular: Negative for chest pain.   Gastrointestinal: Positive for abdominal pain and diarrhea. Negative for nausea and vomiting.   Genitourinary: Negative for dysuria.   Musculoskeletal: Positive for myalgias (generalized body aches). Negative for back pain.   Skin: Negative for rash.   Neurological: Positive for weakness (generalized) and headaches.       Physical Exam     Initial Vitals [10/06/17 1352]   BP Pulse Resp Temp SpO2   135/80 99 18 99.1 °F (37.3 °C) 99 %      MAP       98.33         Physical Exam    Vitals reviewed.  Constitutional: She appears well-developed and well-nourished. No distress.   HENT:   Head: Normocephalic and atraumatic.   Right Ear: Tympanic membrane and ear canal normal.   Left Ear: Tympanic membrane and ear canal normal.   Nose: Rhinorrhea present.   Mouth/Throat: Uvula is midline and oropharynx is clear and moist. Mucous membranes are dry.   Eyes: Conjunctivae and EOM are normal. Pupils are equal, round, and reactive to light.   Neck: Normal range of motion. Neck supple.   Cardiovascular: Regular rhythm and intact distal pulses. Tachycardia present.    Pulmonary/Chest: No respiratory distress. She has no wheezes. She has no rhonchi. She has no rales.   Lungs with decreased breath sounds in the left upper lobe.    Abdominal: Soft. Bowel sounds are normal. She exhibits no distension. There is tenderness (mild,  generalized). There is no rebound and no guarding.   Musculoskeletal: Normal range of motion. She exhibits no edema.   Neurological: She is alert and oriented to person, place, and time. She has normal strength.   Skin: Skin is warm and dry.   Psychiatric: She has a normal mood and affect.         ED Course   Procedures  Labs Reviewed   CBC W/ AUTO DIFFERENTIAL - Abnormal; Notable for the following:        Result Value    RBC 5.94 (*)     MCV 70 (*)     MCH 22.2 (*)     MCHC 31.7 (*)     Gran # 1.1 (*)     Gran% 28.2 (*)     Lymph% 56.0 (*)     All other components within normal limits   COMPREHENSIVE METABOLIC PANEL - Abnormal; Notable for the following:     CO2 33 (*)     Calcium 10.6 (*)     Anion Gap 5 (*)     All other components within normal limits   CLOSTRIDIUM DIFFICILE   CULTURE, STOOL   URINALYSIS, REFLEX TO URINE CULTURE   INFLUENZA A AND B ANTIGEN   STOOL EXAM-OVA,CYSTS,PARASITES         Imaging Results          CT Abdomen Pelvis With Contrast (Final result)  Result time 10/06/17 22:28:48    Final result by Ramirez Cueto MD (10/06/17 22:28:48)                 Impression:      1. No acute findings in the abdomen or pelvis to explain the patient's symptoms.    2. Descending colon diverticula without evidence of diverticulitis.      Electronically signed by: Ramirez Cueto  Date:     10/06/17  Time:    22:28              Narrative:    CT ABDOMEN AND PELVIS WITH CONTRAST    CLINICAL INDICATION:  Abdominal pain. Fever. History of diverticulitis.     TECHNIQUE: 5mm axial images were acquired of the abdomen and pelvis after the administration of intravenous contrast. Coronal and sagittal reconstructions were performed.    COMPARISON: CT abdomen and pelvis with IV contrast, 02/15/2017 and 06/25/2014.    FINDINGS:  Lower chest: Heart is mildly prominent. No pericardial effusion.   There is an 8 mm nodule with smooth borders in the left lower lobe (series 2, image 3). This finding appears stable dating back  to 2014. Lung bases are otherwise clear.    Abdomen: Liver is normal in size and contour.  No focal hepatic lesion.  Gallbladder is normal in appearance.  No stones or wall thickening.  No intra-or extrahepatic biliary ductal dilatation.    Spleen, adrenals, and pancreas are unremarkable.      Kidneys are symmetric.  No renal stones.  Simple appearing cyst in the inferior pole of the right kidney. Stable subcentimeter hypodensity in the mid left kidney, likely a simple cyst. No suspicious renal lesion.  No hydronephrosis.    No distended loops of small bowel.  Appendix is normal. There are a few scattered diverticula in the descending colon without evidence of diverticulitis.    Abdominal aorta is normal in course and caliber with mild calcific atherosclerosis.     Portal, splenic, and superior mesenteric veins are patent.    No abdominal lymphadenopathy.    No free fluid or pneumoperitoneum.    Pelvis: The urinary bladder, uterus, and rectum are unremarkable. No free fluid in the pelvis. No pelvic lymphadenopathy.  No free fluid in the pelvis.  No pelvic lymphadenopathy.    Bones and soft tissues: No aggressive osseous lesions.                                   Medical Decision Making:   History:   Old Medical Records: I decided to obtain old medical records.  Clinical Tests:   Lab Tests: Ordered and Reviewed       APC / Resident Notes:   71 year old female with recent diagnosis of influenza A and diverticulitis presents with persistent fever, cough, and diarrhea.   On exam she has a low grade fever and mild tachycardia. Lungs with decreased breath sounds in the left upper lobe. Abdomen is soft with generalized tenderness. No rebound or guarding.     My initial differential diagnoses include, but are not limited to: influenza with dehydration, pneumonia, diverticulitis with failed outpatient treatment, C.diff.    Labs are reassuring with no leukocytosis and normal electrolytes. Urinalysis benign.   She reports  symptomatic improvement with IV fluids and toradol.   CT abdomen and pelvis are pending. Offered observation however patient would prefer outpatient treament. Dispo pending CT.    9:22PM I have signed out the care of this patient to .  I discussed the care of this patient with my supervising MD.        Yoandyibe Attestation:   Scribe #1: I performed the above scribed service and the documentation accurately describes the services I performed. I attest to the accuracy of the note.  Scribe #2: I performed the above scribed service and the documentation accurately describes the services I performed. I attest to the accuracy of the note.    Attending Attestation:     Physician Attestation Statement for NP/PA:   I have conducted a face to face encounter with this patient in addition to the NP/PA, due to Medical Complexity    Other NP/PA Attestation Additions:    History of Present Illness: 71 y.o. female sent her from Internal Medicine clinic diagnosed with the flu recently and taking tamiflu. She complains of worsening of symptoms. She was recently treated for diverticulitis with Cipro and flagyl. She has continued diarrhea but only occurs after she eats she states when she goes to urinate she has liquid stool at the same time.    Physical Exam: Pt is non-toxic appearing. No respiratory distress.   Medical Decision Making: On initial exam by PA, pt has some abdominal tenderness so CT scan was ordered to evaluate her continued malaise and thought it was somewhat 2/2 partially treated diverticulitis. Her CT scan showed diverticulosis without evidence of diverticulitis. While in ED was hydrated with a liter of nml saline. She was given guaifenesin, tylenol, zyrtec and Toradol and felt significantly better. We ordered stool studies but pt was unable to produce a sample in the almost 9 hours that she was in the ED. Making C.diff less likely. She was advised to follow up with PCP next week.        Physician Attestation  for Evan:      Comments: I, Yesenia Betts, personally performed the services described in this documentation. All medical record entries made by the scribe were at my direction and in my presence.  I have reviewed the chart and agree that the record reflects my personal performance and is accurate and complete. Yesenia Betts, QUINTON.  10:31 PM 10/06/2017    I, Dr. Tyaa Cabrera, personally performed the services described in this documentation. All medical record entries made by the scribe were at my direction and in my presence.  I have reviewed the chart and agree that the record reflects my personal performance and is accurate and complete. Taya Cabrera MD. 10/7/17            ED Course      Clinical Impression:   The encounter diagnosis was Influenza A.                           Taya Cabrera MD  10/08/17 1524

## 2017-10-06 NOTE — ED TRIAGE NOTES
Seen by her PCP Monday and was diagnosed to have the flu.  Presents to ER today due to generalized body aches, coughing, decreased appetite, weakness, diarrhea, and inability to sleep.    GENERAL: The patient is well-developed and well-nourished in no apparent distress. Alert and oriented x4.                                                HEENT: Head is normocephalic and atraumatic. Extraocular muscles are intact. Pupils are equal, round, and reactive to light and accommodation. Nares appeared normal. Mouth is well hydrated and without lesions. Mucous membranes are moist. Posterior pharynx clear of any exudate or lesions.    NECK: Supple. No carotid bruits. No lymphadenopathy or thyromegaly.    LUNGS: Lung sounds are coarse with expiratory wheezes noted all lobes.  Patient has a deep congested non productive cough.    HEART: Regular rate and rhythm without murmur.     ABDOMEN: Soft, nontender, and nondistended. Positive bowel sounds. No hepatosplenomegaly was noted.     EXTREMITIES: Without any cyanosis, clubbing, rash, lesions or edema.     NEUROLOGIC: Cranial nerves II through XII are grossly intact.     PSYCHIATRIC: Flat affect, but denies suicidal or homicidal ideations.    SKIN: No ulceration or induration present.

## 2017-10-07 VITALS
HEIGHT: 66 IN | HEART RATE: 65 BPM | RESPIRATION RATE: 16 BRPM | DIASTOLIC BLOOD PRESSURE: 75 MMHG | BODY MASS INDEX: 25.07 KG/M2 | OXYGEN SATURATION: 95 % | WEIGHT: 156 LBS | SYSTOLIC BLOOD PRESSURE: 129 MMHG | TEMPERATURE: 98 F

## 2017-10-07 NOTE — DISCHARGE INSTRUCTIONS
Take guafenesin by mouth as needed for cough.  Return to the ED for worsening in any way including shortness of breath or any other problems.      Our goal in the emergency department is to always give you outstanding care and exceptional service. You may receive a survey by mail or e-mail in the next week regarding your experience in our ED. We would greatly appreciate your completing and returning the survey. Your feedback provides us with a way to recognize our staff who give very good care and it helps us learn how to improve when your experience was below our aspiration of excellence.

## 2017-10-09 ENCOUNTER — TELEPHONE (OUTPATIENT)
Dept: INTERNAL MEDICINE | Facility: CLINIC | Age: 72
End: 2017-10-09

## 2017-10-09 NOTE — PROGRESS NOTES
Subjective:       Patient ID: Kj Chávez is a 71 y.o. female.    Chief Complaint: Shortness of Breath    HPI: She complains of shortness of breath  Review of Systems   Constitutional: Negative for chills, fatigue, fever and unexpected weight change.   Respiratory: Positive for shortness of breath. Negative for chest tightness.    Cardiovascular: Negative for chest pain and palpitations.   Gastrointestinal: Negative for abdominal pain and blood in stool.   Neurological: Negative for dizziness, syncope, numbness and headaches.       Objective:      Physical Exam   HENT:   Right Ear: External ear normal.   Left Ear: External ear normal.   Nose: Nose normal.   Mouth/Throat: Oropharynx is clear and moist.   Eyes: Pupils are equal, round, and reactive to light.   Neck: Normal range of motion.   Cardiovascular: Normal rate and regular rhythm.    No murmur heard.  Pulmonary/Chest: Breath sounds normal.   Abdominal: She exhibits no distension. There is no hepatosplenomegaly. There is no tenderness.   Lymphadenopathy:     She has no cervical adenopathy.     She has no axillary adenopathy.   Neurological: She has normal strength and normal reflexes. No cranial nerve deficit or sensory deficit.       Assessment:         assessment and plan: Shortness of breath: Patient transferred to emergency room for further evaluation  Plan:       As above

## 2017-10-09 NOTE — TELEPHONE ENCOUNTER
----- Message from Maribel Zurita sent at 10/9/2017 10:45 AM CDT -----  Contact: call pt [phone]  Patient is requesting a return to work form ,   For all of last week and for today and tomorrow

## 2017-10-11 ENCOUNTER — OFFICE VISIT (OUTPATIENT)
Dept: INTERNAL MEDICINE | Facility: CLINIC | Age: 72
End: 2017-10-11
Payer: COMMERCIAL

## 2017-10-11 VITALS
BODY MASS INDEX: 24.27 KG/M2 | HEART RATE: 88 BPM | DIASTOLIC BLOOD PRESSURE: 80 MMHG | OXYGEN SATURATION: 96 % | TEMPERATURE: 99 F | SYSTOLIC BLOOD PRESSURE: 128 MMHG | WEIGHT: 151 LBS | HEIGHT: 66 IN

## 2017-10-11 DIAGNOSIS — R05.9 COUGH: Primary | ICD-10-CM

## 2017-10-11 PROCEDURE — 99214 OFFICE O/P EST MOD 30 MIN: CPT | Mod: S$GLB,,, | Performed by: INTERNAL MEDICINE

## 2017-10-11 PROCEDURE — 99999 PR PBB SHADOW E&M-EST. PATIENT-LVL III: CPT | Mod: PBBFAC,,, | Performed by: INTERNAL MEDICINE

## 2017-10-11 RX ORDER — BENZONATATE 200 MG/1
200 CAPSULE ORAL 3 TIMES DAILY PRN
Qty: 30 CAPSULE | Refills: 0 | Status: SHIPPED | OUTPATIENT
Start: 2017-10-11 | End: 2017-10-21

## 2017-10-11 RX ORDER — FLUOROMETHOLONE 1 MG/ML
SUSPENSION/ DROPS OPHTHALMIC
COMMUNITY
Start: 2017-09-21 | End: 2017-12-18

## 2017-10-11 RX ORDER — OSELTAMIVIR PHOSPHATE 75 MG/1
CAPSULE ORAL
COMMUNITY
Start: 2017-10-02 | End: 2017-12-18

## 2017-10-11 NOTE — LETTER
October 11, 2017    Kj Chávez  [address] [facility] [address]             [facility]  [address]  [place] [address]  Phone: [phone]  Fax: [phone] To Whom It May Concern:    Kj Chávez was seen by me on 10/11/17. Please excuse her from work 10/2/17-10/11/17 due to illness. She may return to work on 10/12/17 without restrictions.       Sincerely,        Maile Hilton MD

## 2017-10-11 NOTE — PROGRESS NOTES
"Subjective:       Patient ID: Kj Chávez is a 71 y.o. female.    Chief Complaint: Cough (pt states that she is recovering from the flu & now has a lingering, dry cough. pt is taking Promethazine to help relieve.)    HPI   70 yo F here for urgent eval of cough.   She was recently seen in urgent care then ED for influenza A with dehydration. Dx influenza on 10/2 then seen in Ed on 10/6. Treated prior to that for diverticulitis with cipro, flagyl on 9/21.  Labs were benign, treated with IVF and toradol. CT at that time for abd tenderness without diverticulitis.     She is taking phenergan with codeine at night for cough. Completed tamiflu.  Flu sx seem better - no fevers, body aches currently.   Still lots of cough. Unproductive.     Review of Systems   Constitutional: Negative for fever.   Respiratory: Positive for cough. Negative for shortness of breath.    Cardiovascular: Negative for chest pain.   Musculoskeletal: Negative.    Skin: Negative.        Objective:   /80 (BP Location: Right arm, Patient Position: Sitting, BP Method: Medium (Manual))   Pulse 88   Ht 5' 6" (1.676 m)   Wt 68.5 kg (151 lb)   SpO2 96%   BMI 24.37 kg/m²      Physical Exam   Constitutional: She is oriented to person, place, and time. She appears well-developed and well-nourished. No distress.   HENT:   Head: Normocephalic and atraumatic.   Cardiovascular: Normal rate and regular rhythm.    Pulmonary/Chest: Effort normal. No respiratory distress. She has no wheezes. She has no rales.   Neurological: She is alert and oriented to person, place, and time.   Skin: Skin is warm and dry. She is not diaphoretic.   Psychiatric: She has a normal mood and affect. Her behavior is normal.       Assessment:       1. Cough        Plan:       Kj was seen today for cough.    Diagnoses and all orders for this visit:    Cough  -     benzonatate (TESSALON) 200 MG capsule; Take 1 capsule (200 mg total) by mouth 3 (three) times daily as " needed for Cough.    Influenza  Resolved  Wait 2 weeks to get flu vaccine    Note that okay to return to work tomorrow given to pt

## 2017-12-18 ENCOUNTER — IMMUNIZATION (OUTPATIENT)
Dept: INTERNAL MEDICINE | Facility: CLINIC | Age: 72
End: 2017-12-18
Payer: COMMERCIAL

## 2017-12-18 ENCOUNTER — OFFICE VISIT (OUTPATIENT)
Dept: INTERNAL MEDICINE | Facility: CLINIC | Age: 72
End: 2017-12-18
Payer: COMMERCIAL

## 2017-12-18 VITALS
DIASTOLIC BLOOD PRESSURE: 86 MMHG | OXYGEN SATURATION: 95 % | BODY MASS INDEX: 22.66 KG/M2 | HEIGHT: 66 IN | SYSTOLIC BLOOD PRESSURE: 130 MMHG | WEIGHT: 141 LBS | HEART RATE: 69 BPM

## 2017-12-18 DIAGNOSIS — M81.0 AGE-RELATED OSTEOPOROSIS WITHOUT CURRENT PATHOLOGICAL FRACTURE: ICD-10-CM

## 2017-12-18 DIAGNOSIS — R39.15 URGENCY OF URINATION: ICD-10-CM

## 2017-12-18 DIAGNOSIS — N39.41 URGE INCONTINENCE OF URINE: ICD-10-CM

## 2017-12-18 DIAGNOSIS — Z00.00 HEALTH CARE MAINTENANCE: Primary | ICD-10-CM

## 2017-12-18 DIAGNOSIS — Z12.31 ENCOUNTER FOR SCREENING MAMMOGRAM FOR BREAST CANCER: ICD-10-CM

## 2017-12-18 DIAGNOSIS — R35.0 FREQUENT URINATION: ICD-10-CM

## 2017-12-18 LAB
BILIRUB UR QL STRIP: NEGATIVE
CAOX CRY UR QL COMP ASSIST: ABNORMAL
CLARITY UR REFRACT.AUTO: ABNORMAL
COLOR UR AUTO: YELLOW
GLUCOSE UR QL STRIP: NEGATIVE
HGB UR QL STRIP: NEGATIVE
KETONES UR QL STRIP: NEGATIVE
LEUKOCYTE ESTERASE UR QL STRIP: ABNORMAL
MICROSCOPIC COMMENT: ABNORMAL
NITRITE UR QL STRIP: NEGATIVE
PH UR STRIP: 6 [PH] (ref 5–8)
PROT UR QL STRIP: NEGATIVE
RBC #/AREA URNS AUTO: 2 /HPF (ref 0–4)
SP GR UR STRIP: 1.02 (ref 1–1.03)
SQUAMOUS #/AREA URNS AUTO: 1 /HPF
URN SPEC COLLECT METH UR: ABNORMAL
UROBILINOGEN UR STRIP-ACNC: NEGATIVE EU/DL
WBC #/AREA URNS AUTO: 2 /HPF (ref 0–5)

## 2017-12-18 PROCEDURE — 90662 IIV NO PRSV INCREASED AG IM: CPT | Mod: S$GLB,,, | Performed by: INTERNAL MEDICINE

## 2017-12-18 PROCEDURE — 87086 URINE CULTURE/COLONY COUNT: CPT

## 2017-12-18 PROCEDURE — 99999 PR PBB SHADOW E&M-EST. PATIENT-LVL IV: CPT | Mod: PBBFAC,,, | Performed by: INTERNAL MEDICINE

## 2017-12-18 PROCEDURE — 90471 IMMUNIZATION ADMIN: CPT | Mod: S$GLB,,, | Performed by: INTERNAL MEDICINE

## 2017-12-18 PROCEDURE — 99397 PER PM REEVAL EST PAT 65+ YR: CPT | Mod: 25,S$GLB,, | Performed by: INTERNAL MEDICINE

## 2017-12-18 PROCEDURE — 81001 URINALYSIS AUTO W/SCOPE: CPT

## 2017-12-18 RX ORDER — OXYBUTYNIN CHLORIDE 5 MG/1
5 TABLET, EXTENDED RELEASE ORAL DAILY
Qty: 30 TABLET | Refills: 11 | Status: SHIPPED | OUTPATIENT
Start: 2017-12-18 | End: 2018-05-30

## 2017-12-18 NOTE — PROGRESS NOTES
"Subjective:       Patient ID: Kj Chávez is a 72 y.o. female.    Chief Complaint: Follow-up (4 mos follow up.) and Cystitis (pt complains of urinating more frequently & a tingling/burning sensation while urinating. )    HPI   73 yo F here for annual physical.     Still frequently urinating. Sometimes burning. Has not been taking ditropan.    Review of Systems   Constitutional: Negative for fever.   HENT: Negative.    Eyes: Negative.    Respiratory: Negative for shortness of breath.    Cardiovascular: Negative for chest pain and leg swelling.   Gastrointestinal: Negative for abdominal pain, diarrhea, nausea and vomiting.   Genitourinary: Negative.    Musculoskeletal: Negative for arthralgias.   Skin: Negative for rash.   Psychiatric/Behavioral: Negative.        Objective:   /86 (BP Location: Left arm, Patient Position: Sitting, BP Method: Medium (Manual))   Pulse 69   Ht 5' 6" (1.676 m)   Wt 64 kg (141 lb)   SpO2 95%   BMI 22.76 kg/m²      Physical Exam   Constitutional: She is oriented to person, place, and time. She appears well-developed and well-nourished.   HENT:   Head: Normocephalic and atraumatic.   Eyes: Conjunctivae and EOM are normal. Pupils are equal, round, and reactive to light.   Neck: Neck supple. No thyromegaly present.   Cardiovascular: Normal rate, regular rhythm and normal heart sounds.    No murmur heard.  Pulmonary/Chest: Effort normal and breath sounds normal. No respiratory distress. She has no wheezes.   Abdominal: Soft. Bowel sounds are normal. She exhibits no distension. There is no tenderness.   Musculoskeletal: Normal range of motion.   Neurological: She is alert and oriented to person, place, and time.   Skin: Skin is warm and dry. No rash noted.   Psychiatric: She has a normal mood and affect. Judgment and thought content normal.   Vitals reviewed.      Assessment:       1. Health care maintenance    2. Frequent urination    3. Urgency of urination    4. Urge " incontinence of urine    5. Age-related osteoporosis without current pathological fracture    6. Encounter for screening mammogram for breast cancer        Plan:       Kj was seen today for follow-up and cystitis.    Diagnoses and all orders for this visit:    Health care maintenance  -     CBC auto differential; Future  -     Comprehensive metabolic panel; Future  -     Hemoglobin A1c; Future  -     Lipid panel; Future  -     TSH; Future    Frequent urination  -     Urinalysis  -     Urine culture    Urgency of urination  Urge incontinence of urine  -     Restart oxybutynin (DITROPAN-XL) 5 MG TR24; Take 1 tablet (5 mg total) by mouth once daily.    Age-related osteoporosis without current pathological fracture  -     DXA Bone Density Spine And Hip; Future    Encounter for screening mammogram for breast cancer  -     Mammo Digital Screening Bilateral With CAD; Future

## 2017-12-20 ENCOUNTER — TELEPHONE (OUTPATIENT)
Dept: INTERNAL MEDICINE | Facility: CLINIC | Age: 72
End: 2017-12-20

## 2017-12-20 LAB
BACTERIA UR CULT: NORMAL
BACTERIA UR CULT: NORMAL

## 2017-12-20 NOTE — TELEPHONE ENCOUNTER
Please call pt. Her urine culture showed a small amount of white blood cells and multiple bacteria but no true infection. It appears the sample was contaminated by skin cells. I do not think you need an antibiotic at this time. Please continue the ditropan and let me know if urine symptoms fail to improve.

## 2017-12-21 NOTE — TELEPHONE ENCOUNTER
Spoke with pt. Informed pt about urine culture, pt expresses understanding of results. Advised pt to give our office a call back immediately if symptoms worsen or fail to improve. Verbalized understanding.

## 2018-01-07 ENCOUNTER — OFFICE VISIT (OUTPATIENT)
Dept: INTERNAL MEDICINE | Facility: CLINIC | Age: 73
End: 2018-01-07
Payer: COMMERCIAL

## 2018-01-07 VITALS
DIASTOLIC BLOOD PRESSURE: 74 MMHG | OXYGEN SATURATION: 96 % | BODY MASS INDEX: 25.83 KG/M2 | TEMPERATURE: 98 F | SYSTOLIC BLOOD PRESSURE: 124 MMHG | WEIGHT: 160.69 LBS | HEART RATE: 79 BPM | HEIGHT: 66 IN

## 2018-01-07 DIAGNOSIS — M54.50 ACUTE BILATERAL LOW BACK PAIN WITHOUT SCIATICA: ICD-10-CM

## 2018-01-07 DIAGNOSIS — M54.9 BACK PAIN, UNSPECIFIED BACK LOCATION, UNSPECIFIED BACK PAIN LATERALITY, UNSPECIFIED CHRONICITY: Primary | ICD-10-CM

## 2018-01-07 LAB
BILIRUB SERPL-MCNC: NORMAL MG/DL
BLOOD URINE, POC: NORMAL
COLOR, POC UA: YELLOW
GLUCOSE UR QL STRIP: NORMAL
KETONES UR QL STRIP: NORMAL
LEUKOCYTE ESTERASE URINE, POC: NORMAL
NITRITE, POC UA: NORMAL
PH, POC UA: 6
PROTEIN, POC: NORMAL
SPECIFIC GRAVITY, POC UA: 1.02
UROBILINOGEN, POC UA: NORMAL

## 2018-01-07 PROCEDURE — 99999 PR PBB SHADOW E&M-EST. PATIENT-LVL IV: CPT | Mod: PBBFAC,,, | Performed by: INTERNAL MEDICINE

## 2018-01-07 PROCEDURE — 81002 URINALYSIS NONAUTO W/O SCOPE: CPT | Mod: S$GLB,,, | Performed by: INTERNAL MEDICINE

## 2018-01-07 PROCEDURE — 99213 OFFICE O/P EST LOW 20 MIN: CPT | Mod: 25,S$GLB,, | Performed by: INTERNAL MEDICINE

## 2018-01-07 RX ORDER — CYCLOBENZAPRINE HCL 10 MG
10 TABLET ORAL NIGHTLY
Qty: 30 TABLET | Refills: 1 | Status: SHIPPED | OUTPATIENT
Start: 2018-01-07 | End: 2018-02-06

## 2018-01-07 RX ORDER — TRAMADOL HYDROCHLORIDE 50 MG/1
50 TABLET ORAL EVERY 6 HOURS PRN
Qty: 30 TABLET | Refills: 0 | Status: SHIPPED | OUTPATIENT
Start: 2018-01-07 | End: 2018-01-17

## 2018-01-07 NOTE — PROGRESS NOTES
Subjective:       Patient ID: Kj Chávez is a 72 y.o. female.    Chief Complaint: Back Pain    Low back pain started about 24 hours after lifting heavy luggage      Back Pain   This is a new problem. The current episode started in the past 7 days. The problem occurs constantly. The problem has been gradually worsening since onset. The pain is present in the lumbar spine. The quality of the pain is described as cramping and aching. The pain does not radiate. The pain is at a severity of 5/10. The pain is moderate. The symptoms are aggravated by sitting and standing. Pertinent negatives include no abdominal pain, bladder incontinence, bowel incontinence, chest pain, dysuria, fever, headaches, leg pain, numbness, paresthesias, pelvic pain, tingling or weakness. Risk factors include menopause. She has tried NSAIDs for the symptoms. The treatment provided mild relief.     Review of Systems   Constitutional: Negative for activity change, chills, fatigue and fever.   HENT: Negative for congestion, ear pain, nosebleeds, postnasal drip, sinus pressure and sore throat.    Eyes: Negative.  Negative for visual disturbance.   Respiratory: Negative for cough, chest tightness, shortness of breath and wheezing.    Cardiovascular: Negative for chest pain.   Gastrointestinal: Negative for abdominal pain, bowel incontinence, diarrhea, nausea and vomiting.   Genitourinary: Negative for bladder incontinence, difficulty urinating, dysuria, frequency, pelvic pain and urgency.   Musculoskeletal: Positive for back pain. Negative for arthralgias and neck stiffness.   Skin: Negative for rash.   Neurological: Negative for dizziness, tingling, weakness, numbness, headaches and paresthesias.   Psychiatric/Behavioral: Negative for sleep disturbance. The patient is not nervous/anxious.        Objective:      Physical Exam   Musculoskeletal:        Back:        Assessment:       1. Back pain, unspecified back location, unspecified back pain  laterality, unspecified chronicity    2. Acute bilateral low back pain without sciatica        Plan:   Kj was seen today for back pain.    Diagnoses and all orders for this visit:    Back pain, unspecified back location, unspecified back pain laterality, unspecified chronicity  -     POCT urine dipstick without microscope  -     Ambulatory [facility]    Acute bilateral low back pain without sciatica  -     Ambulatory Consult to [facility]    Other orders  -     cyclobenzaprine (FLEXERIL) 10 MG tablet; Take 1 tablet (10 mg total) by mouth every evening.  -     traMADol (ULTRAM) 50 mg tablet; Take 1 tablet (50 mg total) by mouth every 6 (six) hours as needed for Pain.

## 2018-05-30 ENCOUNTER — OFFICE VISIT (OUTPATIENT)
Dept: OBSTETRICS AND GYNECOLOGY | Facility: CLINIC | Age: 73
End: 2018-05-30
Payer: COMMERCIAL

## 2018-05-30 VITALS
DIASTOLIC BLOOD PRESSURE: 66 MMHG | HEIGHT: 66 IN | SYSTOLIC BLOOD PRESSURE: 110 MMHG | BODY MASS INDEX: 25.33 KG/M2 | WEIGHT: 157.63 LBS

## 2018-05-30 DIAGNOSIS — N89.8 VAGINAL ITCHING: Primary | ICD-10-CM

## 2018-05-30 DIAGNOSIS — R30.0 DYSURIA: ICD-10-CM

## 2018-05-30 DIAGNOSIS — N95.2 POSTMENOPAUSAL ATROPHIC VAGINITIS: ICD-10-CM

## 2018-05-30 LAB
BILIRUB UR QL STRIP: NEGATIVE
CLARITY UR REFRACT.AUTO: CLEAR
COLOR UR AUTO: YELLOW
GLUCOSE UR QL STRIP: NEGATIVE
HGB UR QL STRIP: NEGATIVE
KETONES UR QL STRIP: NEGATIVE
LEUKOCYTE ESTERASE UR QL STRIP: ABNORMAL
MICROSCOPIC COMMENT: NORMAL
NITRITE UR QL STRIP: NEGATIVE
PH UR STRIP: 6 [PH] (ref 5–8)
PROT UR QL STRIP: NEGATIVE
RBC #/AREA URNS AUTO: 1 /HPF (ref 0–4)
SP GR UR STRIP: 1.01 (ref 1–1.03)
SQUAMOUS #/AREA URNS AUTO: 1 /HPF
URN SPEC COLLECT METH UR: ABNORMAL
UROBILINOGEN UR STRIP-ACNC: NEGATIVE EU/DL
WBC #/AREA URNS AUTO: 2 /HPF (ref 0–5)

## 2018-05-30 PROCEDURE — 3078F DIAST BP <80 MM HG: CPT | Mod: CPTII,S$GLB,, | Performed by: OBSTETRICS & GYNECOLOGY

## 2018-05-30 PROCEDURE — 81001 URINALYSIS AUTO W/SCOPE: CPT

## 2018-05-30 PROCEDURE — 99999 PR PBB SHADOW E&M-EST. PATIENT-LVL III: CPT | Mod: PBBFAC,,, | Performed by: OBSTETRICS & GYNECOLOGY

## 2018-05-30 PROCEDURE — 87510 GARDNER VAG DNA DIR PROBE: CPT

## 2018-05-30 PROCEDURE — 3074F SYST BP LT 130 MM HG: CPT | Mod: CPTII,S$GLB,, | Performed by: OBSTETRICS & GYNECOLOGY

## 2018-05-30 PROCEDURE — 87086 URINE CULTURE/COLONY COUNT: CPT

## 2018-05-30 PROCEDURE — 99203 OFFICE O/P NEW LOW 30 MIN: CPT | Mod: S$GLB,,, | Performed by: OBSTETRICS & GYNECOLOGY

## 2018-05-30 PROCEDURE — 87480 CANDIDA DNA DIR PROBE: CPT

## 2018-05-30 RX ORDER — ESTRADIOL 0.1 MG/G
0.5 CREAM VAGINAL
Qty: 42 G | Refills: 4 | Status: SHIPPED | OUTPATIENT
Start: 2018-05-31 | End: 2020-04-02

## 2018-05-30 NOTE — PROGRESS NOTES
Kj Chávez is a 72 y.o. female patient  NEW TO ME who presents today with complaints of a one week history of vaginal itching and burning with urination.  Patient's last menstrual period was 2000 (approximate).    Past Medical History:   Diagnosis Date    Allergy     Colon polyp     Gastric nodule     GERD (gastroesophageal reflux disease)     Hypertension     mild, resolved     Osteoporosis      Past Surgical History:   Procedure Laterality Date    COLONOSCOPY N/A 2016    Procedure: COLONOSCOPY;  Surgeon: Christian Medina MD;  Location: [facility] ([address] [place] [address]);  Service: Endoscopy;  Laterality: N/A;    TUBAL LIGATION       Social History     Social History    Marital status:      Spouse name: Augie    Number of children: 3    Years of education: N/A     Occupational History     [employer]      Social History Main Topics    Smoking status: Never Smoker    Smokeless tobacco: Never Used    Alcohol use No    Drug use: No    Sexual activity: Yes     Partners: Male      Comment: , together since      Other Topics Concern    Not on file     Social History Narrative    No narrative on file     Family History   Problem Relation Age of Onset    Colon cancer Mother 80    No Known Problems Father     Breast cancer Sister 50    Ovarian cancer Neg Hx     Diabetes Neg Hx     Hypertension Neg Hx     Stroke Neg Hx      OB History      Para Term  AB Living    3 3 3     3    SAB TAB Ectopic Multiple Live Births            3                ROS:  GENERAL: Feeling well overall.   SKIN: Denies rash or lesions.   HEAD: Denies head injury or headache.   NODES: Denies enlarged lymph nodes.   CHEST: Denies chest pain or shortness of breath.   CARDIOVASCULAR: Denies palpitations or left sided chest pain.   ABDOMEN: No abdominal pain, nausea, vomiting or rectal bleeding.   URINARY: No dysuria, hematuria, or burning on urination.  REPRODUCTIVE: See HPI.  +  "DYSPAREUNIA  BREASTS: Denies pain, lumps, or nipple discharge.   HEMATOLOGIC: No easy bruisability or excessive bleeding.   MUSCULOSKELETAL: Denies joint pain or swelling.   NEUROLOGIC: Denies syncope or weakness.   PSYCHIATRIC: Denies depression.    /66   Ht 5' 6" (1.676 m)   Wt 71.5 kg (157 lb 10.1 oz)   LMP 05/30/2000 (Approximate)   BMI 25.44 kg/m²     PE:   APPEARANCE: Well nourished, well developed, in no acute distress.  ABDOMEN: Soft. No tenderness or masses. No hernias. No CVA tenderness.  VULVA: No lesions. Normal female genitalia.  URETHRAL MEATUS: Normal size and location, no lesions, no prolapse.Small urethral caruncle.  URETHRA: No masses, tenderness, prolapse or scarring.  VAGINA:DRY and POORLY rugated, no discharge, no significant cystocele or rectocele.  CERVIX: No lesions and discharge.   UTERUS: NOT PALPABLY ENLARGED BUT DIFFICULT TO DELINEATE SECONDARY TO HABITUS, non-tender, bladder base nontender.  ADNEXA: No masses, tenderness or CDS nodularity.  ANUS PERINEUM: Normal.    PROCEDURES:    1. Vaginal itching  Vaginosis Screen by DNA Probe   2. Dysuria  Urine culture    Urinalysis   3. Postmenopausal atrophic vaginitis  estradiol (ESTRACE) 0.01 % (0.1 mg/gram) vaginal cream    AND PLAN:      Follow-up in about 2 years (around 5/30/2020).    "

## 2018-05-31 LAB
CANDIDA RRNA VAG QL PROBE: NEGATIVE
G VAGINALIS RRNA GENITAL QL PROBE: NEGATIVE
T VAGINALIS RRNA GENITAL QL PROBE: NEGATIVE

## 2018-06-01 LAB
BACTERIA UR CULT: NORMAL
BACTERIA UR CULT: NORMAL

## 2018-06-06 ENCOUNTER — OFFICE VISIT (OUTPATIENT)
Dept: UROLOGY | Facility: CLINIC | Age: 73
End: 2018-06-06

## 2018-06-06 VITALS — DIASTOLIC BLOOD PRESSURE: 83 MMHG | HEART RATE: 76 BPM | SYSTOLIC BLOOD PRESSURE: 128 MMHG

## 2018-06-06 DIAGNOSIS — R35.0 FREQUENCY OF URINATION: Primary | ICD-10-CM

## 2018-06-06 DIAGNOSIS — N95.2 VAGINAL ATROPHY: ICD-10-CM

## 2018-06-06 DIAGNOSIS — R39.15 URGENCY OF URINATION: ICD-10-CM

## 2018-06-06 PROCEDURE — 99214 OFFICE O/P EST MOD 30 MIN: CPT | Mod: S$PBB,,, | Performed by: PHYSICIAN ASSISTANT

## 2018-06-06 PROCEDURE — 99999 PR PBB SHADOW E&M-EST. PATIENT-LVL III: CPT | Mod: PBBFAC,,, | Performed by: PHYSICIAN ASSISTANT

## 2018-06-06 PROCEDURE — 99213 OFFICE O/P EST LOW 20 MIN: CPT | Mod: PBBFAC,25 | Performed by: PHYSICIAN ASSISTANT

## 2018-06-06 PROCEDURE — 51798 US URINE CAPACITY MEASURE: CPT | Mod: PBBFAC | Performed by: PHYSICIAN ASSISTANT

## 2018-06-06 PROCEDURE — 81002 URINALYSIS NONAUTO W/O SCOPE: CPT | Mod: PBBFAC | Performed by: PHYSICIAN ASSISTANT

## 2018-06-06 RX ORDER — OXYBUTYNIN CHLORIDE 5 MG/1
5 TABLET, EXTENDED RELEASE ORAL DAILY
Qty: 30 TABLET | Refills: 11 | Status: SHIPPED | OUTPATIENT
Start: 2018-06-06 | End: 2020-04-02

## 2018-06-06 NOTE — LETTER
June 6, 2018      Maile Hilton MD  [address]  [facility] [facility] [address]           [facility] - Urology 4th Floor  [address]  [facility] [address]  Phone: [phone]          Patient: Kj Cháevz   MR Number: [id number]   YOB: 1945   Date of Visit: 6/6/2018       Dear Dr. Maile Hilton:    Thank you for referring Kj Chávez to me for evaluation. Attached you will find relevant portions of my assessment and plan of care.    If you have questions, please do not hesitate to call me. I look forward to following Kj Chávez along with you.    Sincerely,    Nataliya Lambert PA-C    Enclosure  CC:  No Recipients    If you would like to receive this communication electronically, please contact [email or web address] or [phone] to request more information on [facility] Link access.    For providers and/or their staff who would like to refer a patient to [facility], please contact us through our one-stop-shop provider referral line, [facility], at [phone].    If you feel you have received this communication in error or would no longer like to receive these types of communications, please e-mail [email or web address]

## 2018-06-06 NOTE — PROGRESS NOTES
CHIEF COMPLAINT:    Mrs. Chávez is a 72 y.o. female presenting for annual visit.  PRESENTING ILLNESS:    Kj Chávez is a 72 y.o. female with a PMH of urinary urgency and incontinence who presents for annual visit.  She reports urinary frequency 30min - 1hour after drinking.  She only has urinary incontinence if she holds her urine.  She does not have to wear pads.  She has nocturia x 0 unless she drinks late at night.  She denies dysuria.  She hasn't taken oxybutynin in several months stating the pharmacy did not have it.   When she was taking it, she noticed improvement in her symptoms.    A week ago she was experiencing itch and burning.  She presented to her OBGYN and was diagnosed with vaginal dryness.  She was given estrace but hasn't started using it yet.      REVIEW OF SYSTEMS:      Constitutional: Negative for fever and chills.   HENT: Negative for hearing loss.   Eyes: Negative for visual disturbance.   Respiratory: Negative for shortness of breath.   Cardiovascular: Negative for chest pain.   Gastrointestinal: Negative for vomiting, and constipation.   Genitourinary:  See HPI  Neurological: Negative for dizziness.   Hematological: Does not bruise/bleed easily.   Psychiatric/Behavioral: Negative for confusion.     PATIENT HISTORY:    Past Medical History:   Diagnosis Date    Allergy     Colon polyp     Gastric nodule     GERD (gastroesophageal reflux disease)     Hypertension     mild, resolved     Osteoporosis        Past Surgical History:   Procedure Laterality Date    COLONOSCOPY N/A 2/23/2016    Procedure: COLONOSCOPY;  Surgeon: Christian Medina MD;  Location: [address];  Service: Endoscopy;  Laterality: N/A;    TUBAL LIGATION         Family History   Problem Relation Age of Onset    Colon cancer Mother 80    No Known Problems Father     Breast cancer Sister 50    Ovarian cancer Neg Hx     Diabetes Neg Hx     Hypertension Neg Hx     Stroke Neg Hx        Social History      Social History    Marital status:      Spouse name: Augie    Number of children: 3    Years of education: N/A     Occupational History     [employer]      Social History Main Topics    Smoking status: Never Smoker    Smokeless tobacco: Never Used    Alcohol use No    Drug use: No    Sexual activity: Yes     Partners: Male      Comment: , together since 1992     Other Topics Concern    Not on file     Social History Narrative    No narrative on file       Allergies:  Sulfa (sulfonamide antibiotics) and Codeine    Medications:    Current Outpatient Prescriptions:     BIOTIN ORAL, Take by mouth once daily., Disp: , Rfl:     calcium citrate-vitamin D3 315-200 mg (CITRACAL+D) 315-200 mg-unit per tablet, Take 1 tablet by mouth 2 (two) times daily., Disp: , Rfl:     estradiol (ESTRACE) 0.01 % (0.1 mg/gram) vaginal cream, Place 0.5 g vaginally twice a week. Insert 0.5grams intravaginally twice weekly, Disp: 42 g, Rfl: 4    fish oil-omega-3 fatty acids 300-1,000 mg capsule, Take 2 g by mouth once daily.  , Disp: , Rfl:     fluticasone (FLONASE) 50 mcg/actuation nasal spray, 2 sprays by Each Nare route once daily., Disp: 1 Bottle, Rfl: 0    ibuprofen (ADVIL,MOTRIN) 100 MG tablet, Take 100 mg by mouth every 6 (six) hours as needed for Temperature greater than., Disp: , Rfl:     loratadine-pseudoephedrine  mg (CLARITIN-D 24-HOUR)  mg per 24 hr tablet, Take 1 tablet by mouth once daily., Disp: , Rfl:     oxybutynin (DITROPAN-XL) 5 MG TR24, Take 1 tablet (5 mg total) by mouth once daily., Disp: 30 tablet, Rfl: 11    PHYSICAL EXAMINATION:    Constitutional: She appears well-developed and well-nourished.  She is in no apparent distress.    Eyes: No scleral icterus noted bilaterally.  No discharge noted bilaterally.      Nose: No rhinorrhea    Cardiovascular: Normal rate.  No pitting edema noted in lower extremities bilaterally    Pulmonary/Chest: Effort normal. No  respiratory distress.     Abdominal:  She exhibits no distension.  There is no CVA tenderness.     Lymphadenopathy:          Right: No supraclavicular adenopathy present.        Left: No supraclavicular adenopathy present.     Neurological: She is alert and oriented to person, place, and time.     Skin: Skin is warm and dry.     Psych: Cooperative with normal affect.    Bladder scan performed by Nurse Amber.  PVR 36ml    Physical Exam      LABS:    U/a: 1.015, pH 5, negative.      IMPRESSION:    Encounter Diagnoses   Name Primary?    Frequency of urination Yes    Urgency of urination     Vaginal atrophy        PLAN:    Will restart oxybutynin.  Refilled prescription.       Discussed how to apply estrace cream.      Follow up in 1 year or sooner if needed.      Nataliya Lambert PA-C

## 2018-10-31 ENCOUNTER — OFFICE VISIT (OUTPATIENT)
Dept: INTERNAL MEDICINE | Facility: CLINIC | Age: 73
End: 2018-10-31
Payer: COMMERCIAL

## 2018-10-31 VITALS
HEART RATE: 69 BPM | HEIGHT: 66 IN | BODY MASS INDEX: 25.66 KG/M2 | SYSTOLIC BLOOD PRESSURE: 128 MMHG | WEIGHT: 159.63 LBS | DIASTOLIC BLOOD PRESSURE: 80 MMHG | OXYGEN SATURATION: 97 %

## 2018-10-31 DIAGNOSIS — K59.00 CONSTIPATION, UNSPECIFIED CONSTIPATION TYPE: ICD-10-CM

## 2018-10-31 DIAGNOSIS — S39.011A STRAIN OF ABDOMINAL WALL, INITIAL ENCOUNTER: Primary | ICD-10-CM

## 2018-10-31 DIAGNOSIS — R82.998 URINE WBC INCREASED: ICD-10-CM

## 2018-10-31 DIAGNOSIS — R35.0 URINARY FREQUENCY: ICD-10-CM

## 2018-10-31 LAB
BILIRUB SERPL-MCNC: ABNORMAL MG/DL
BILIRUB UR QL STRIP: NEGATIVE
BLOOD URINE, POC: ABNORMAL
CAOX CRY UR QL COMP ASSIST: ABNORMAL
CLARITY UR REFRACT.AUTO: ABNORMAL
COLOR UR AUTO: YELLOW
COLOR, POC UA: YELLOW
GLUCOSE UR QL STRIP: NEGATIVE
GLUCOSE UR QL STRIP: NORMAL
HGB UR QL STRIP: NEGATIVE
KETONES UR QL STRIP: ABNORMAL
KETONES UR QL STRIP: NEGATIVE
LEUKOCYTE ESTERASE UR QL STRIP: ABNORMAL
LEUKOCYTE ESTERASE URINE, POC: ABNORMAL
MICROSCOPIC COMMENT: ABNORMAL
NITRITE UR QL STRIP: NEGATIVE
NITRITE, POC UA: ABNORMAL
NON-SQ EPI CELLS #/AREA URNS AUTO: 2 /HPF
PH UR STRIP: 6 [PH] (ref 5–8)
PH, POC UA: 6
PROT UR QL STRIP: NEGATIVE
PROTEIN, POC: ABNORMAL
RBC #/AREA URNS AUTO: 1 /HPF (ref 0–4)
SP GR UR STRIP: 1.02 (ref 1–1.03)
SPECIFIC GRAVITY, POC UA: 1.02
SQUAMOUS #/AREA URNS AUTO: 6 /HPF
URN SPEC COLLECT METH UR: ABNORMAL
UROBILINOGEN, POC UA: NORMAL
WBC #/AREA URNS AUTO: 17 /HPF (ref 0–5)

## 2018-10-31 PROCEDURE — 99214 OFFICE O/P EST MOD 30 MIN: CPT | Mod: 25,S$GLB,, | Performed by: NURSE PRACTITIONER

## 2018-10-31 PROCEDURE — 81002 URINALYSIS NONAUTO W/O SCOPE: CPT | Mod: S$GLB,,, | Performed by: NURSE PRACTITIONER

## 2018-10-31 PROCEDURE — 87086 URINE CULTURE/COLONY COUNT: CPT

## 2018-10-31 PROCEDURE — 3074F SYST BP LT 130 MM HG: CPT | Mod: CPTII,S$GLB,, | Performed by: NURSE PRACTITIONER

## 2018-10-31 PROCEDURE — 1100F PTFALLS ASSESS-DOCD GE2>/YR: CPT | Mod: CPTII,S$GLB,, | Performed by: NURSE PRACTITIONER

## 2018-10-31 PROCEDURE — 3288F FALL RISK ASSESSMENT DOCD: CPT | Mod: CPTII,S$GLB,, | Performed by: NURSE PRACTITIONER

## 2018-10-31 PROCEDURE — 81001 URINALYSIS AUTO W/SCOPE: CPT

## 2018-10-31 PROCEDURE — 99999 PR PBB SHADOW E&M-EST. PATIENT-LVL IV: CPT | Mod: PBBFAC,,, | Performed by: NURSE PRACTITIONER

## 2018-10-31 PROCEDURE — 3079F DIAST BP 80-89 MM HG: CPT | Mod: CPTII,S$GLB,, | Performed by: NURSE PRACTITIONER

## 2018-10-31 RX ORDER — CIPROFLOXACIN 500 MG/1
500 TABLET ORAL EVERY 12 HOURS
Qty: 14 TABLET | Refills: 0 | Status: SHIPPED | OUTPATIENT
Start: 2018-10-31 | End: 2018-11-07

## 2018-10-31 NOTE — PROGRESS NOTES
"Subjective:       Patient ID: Kj Chávez is a 72 y.o. female.    Chief Complaint: Abdominal Pain    HPI:  73 yo female that presents to clinic today with left sided abdominal pain.    States that the pain started 4 days ago when she was at [employer].  States that she had a sharp shooting pain that went up the left side of her abdomen.  Denies any trauma to her stomach.  States that she had been "lifitng and moving boxes" at school a few days prior.  States that she had the sharp pain again on Monday but states for the past two days she has had soreness on the left side of her stomach.  States that she has had some increased urinary frequency and urgency but no dysuria.  States that she had a bowel movement earlier today but sometimes has trouble with constipation.  Denies any rectal bleeding, pain or blood in stool.    States that pain is made worse with changes in body position.  States that she does get some relief with heat therapy.  States that she has not tried taking anything orally for pain.    Denies any fever, SOB, chest pain, n/v or dizziness.  States that appetite and energy level are good.    Review of Systems   Constitutional: Negative for activity change, appetite change, fatigue and fever.   Respiratory: Negative for apnea, cough, shortness of breath and wheezing.    Cardiovascular: Negative for chest pain, palpitations and leg swelling.   Gastrointestinal: Positive for abdominal pain and constipation. Negative for abdominal distention, anal bleeding, blood in stool, diarrhea, nausea, rectal pain and vomiting.   Genitourinary: Positive for frequency and urgency. Negative for difficulty urinating, dysuria, flank pain and hematuria.   Musculoskeletal: Negative for arthralgias, back pain, myalgias, neck pain and neck stiffness.   Skin: Negative for color change and rash.   Neurological: Negative for dizziness, light-headedness, numbness and headaches.   Psychiatric/Behavioral: Negative for " behavioral problems.       Objective:      Physical Exam   Constitutional: She is oriented to person, place, and time. She appears well-developed and well-nourished. No distress.   Neck: Normal range of motion. Neck supple. No thyromegaly present.   Cardiovascular: Normal rate, regular rhythm, normal heart sounds and intact distal pulses.   No murmur heard.  Pulmonary/Chest: Effort normal and breath sounds normal. No stridor. No respiratory distress. She has no wheezes. She has no rales.   Abdominal: Soft. Normal appearance and bowel sounds are normal. There is tenderness in the left upper quadrant and left lower quadrant. There is no rigidity, no rebound, no guarding, no CVA tenderness, no tenderness at McBurney's point and negative Tracy's sign. No hernia.       There is mild tenderness with palpation to both LUQ and LLQ.  NO pain with palpations to right side of abdomen.  There is no rebound tenderness.    Lymphadenopathy:     She has no cervical adenopathy.   Neurological: She is alert and oriented to person, place, and time. No cranial nerve deficit or sensory deficit.   Skin: Skin is warm and dry. No erythema.   Psychiatric: Her behavior is normal.       Assessment:       1. Strain of abdominal wall, initial encounter    2. Urine WBC increased    3. Urinary frequency    4. Constipation, unspecified constipation type        Plan:     1.  Strain of abdominal wall.  -Vitals are stable in clinic.  -Appears to be musculoskeletal in origin.  -Can continue to apply heat therapy to abdomen.  -Encouraged use of OTC advil or tylenol to help with pain.  -Avoid any heavy or strenuous lifitng for the next few days.    2.  Urine WBC increase/urinary frequency  -UA dipstick shows ++ WBCs but negative nitrites.  -Given urinary frequency and urgency, will start treatment with cipro 500mg po bid x 7 days.  -Will send urine off for urinalysis and culture.  -Will discontinue antibiotics if culture is negative for bacterial  growth.    3.  Constipation  -Encouraged to increase water and fiber intake in diet.  -Can take OTC stool softener to help with bowel movements.

## 2018-11-01 LAB
BACTERIA UR CULT: NORMAL
BACTERIA UR CULT: NORMAL

## 2018-11-02 ENCOUNTER — TELEPHONE (OUTPATIENT)
Dept: INTERNAL MEDICINE | Facility: CLINIC | Age: 73
End: 2018-11-02

## 2018-11-02 NOTE — TELEPHONE ENCOUNTER
Spoke to patient, results given per NP instruction, pt expressed verbal understanding, no additional questions at this time.    Gisela PALACIOS

## 2018-11-02 NOTE — PROGRESS NOTES
Hi June,    Can you give Ms Chávez a call and let her know the urine culture did show some bacterial growth.  She can go ahead and finish out the antibiotic.  Thanks.    Sukhi

## 2019-02-09 ENCOUNTER — OFFICE VISIT (OUTPATIENT)
Dept: INTERNAL MEDICINE | Facility: CLINIC | Age: 74
End: 2019-02-09
Payer: COMMERCIAL

## 2019-02-09 ENCOUNTER — IMMUNIZATION (OUTPATIENT)
Dept: INTERNAL MEDICINE | Facility: CLINIC | Age: 74
End: 2019-02-09
Payer: COMMERCIAL

## 2019-02-09 ENCOUNTER — LAB VISIT (OUTPATIENT)
Dept: LAB | Facility: HOSPITAL | Age: 74
End: 2019-02-09
Attending: INTERNAL MEDICINE
Payer: COMMERCIAL

## 2019-02-09 VITALS
OXYGEN SATURATION: 96 % | BODY MASS INDEX: 26.01 KG/M2 | HEART RATE: 81 BPM | DIASTOLIC BLOOD PRESSURE: 72 MMHG | HEIGHT: 66 IN | TEMPERATURE: 99 F | WEIGHT: 161.81 LBS | SYSTOLIC BLOOD PRESSURE: 128 MMHG

## 2019-02-09 DIAGNOSIS — Z86.018 HISTORY OF BENIGN GASTRIC TUMOR: ICD-10-CM

## 2019-02-09 DIAGNOSIS — R35.0 URINARY FREQUENCY: ICD-10-CM

## 2019-02-09 DIAGNOSIS — N28.1 RENAL CYSTS, ACQUIRED, BILATERAL: ICD-10-CM

## 2019-02-09 DIAGNOSIS — R10.9 ABDOMINAL PAIN, UNSPECIFIED ABDOMINAL LOCATION: ICD-10-CM

## 2019-02-09 DIAGNOSIS — R10.9 ABDOMINAL PAIN, UNSPECIFIED ABDOMINAL LOCATION: Primary | ICD-10-CM

## 2019-02-09 DIAGNOSIS — M81.0 OSTEOPOROSIS, UNSPECIFIED OSTEOPOROSIS TYPE, UNSPECIFIED PATHOLOGICAL FRACTURE PRESENCE: ICD-10-CM

## 2019-02-09 DIAGNOSIS — Z12.31 SCREENING MAMMOGRAM, ENCOUNTER FOR: ICD-10-CM

## 2019-02-09 LAB
ALBUMIN SERPL BCP-MCNC: 3.7 G/DL
ALP SERPL-CCNC: 99 U/L
ALT SERPL W/O P-5'-P-CCNC: 10 U/L
AMYLASE SERPL-CCNC: 77 U/L
ANION GAP SERPL CALC-SCNC: 7 MMOL/L
AST SERPL-CCNC: 13 U/L
BASOPHILS # BLD AUTO: 0.06 K/UL
BASOPHILS NFR BLD: 1.4 %
BILIRUB SERPL-MCNC: 0.3 MG/DL
BILIRUB UR QL STRIP: NEGATIVE
BUN SERPL-MCNC: 11 MG/DL
CALCIUM SERPL-MCNC: 10.6 MG/DL
CAOX CRY UR QL COMP ASSIST: ABNORMAL
CHLORIDE SERPL-SCNC: 104 MMOL/L
CLARITY UR REFRACT.AUTO: ABNORMAL
CO2 SERPL-SCNC: 28 MMOL/L
COLOR UR AUTO: YELLOW
CREAT SERPL-MCNC: 0.8 MG/DL
CREAT SERPL-MCNC: 0.8 MG/DL
DIFFERENTIAL METHOD: ABNORMAL
EOSINOPHIL # BLD AUTO: 0.1 K/UL
EOSINOPHIL NFR BLD: 2.9 %
ERYTHROCYTE [DISTWIDTH] IN BLOOD BY AUTOMATED COUNT: 15.1 %
EST. GFR  (AFRICAN AMERICAN): >60 ML/MIN/1.73 M^2
EST. GFR  (AFRICAN AMERICAN): >60 ML/MIN/1.73 M^2
EST. GFR  (NON AFRICAN AMERICAN): >60 ML/MIN/1.73 M^2
EST. GFR  (NON AFRICAN AMERICAN): >60 ML/MIN/1.73 M^2
GLUCOSE SERPL-MCNC: 85 MG/DL
GLUCOSE UR QL STRIP: NEGATIVE
HCT VFR BLD AUTO: 39.4 %
HGB BLD-MCNC: 11.7 G/DL
HGB UR QL STRIP: NEGATIVE
KETONES UR QL STRIP: NEGATIVE
LEUKOCYTE ESTERASE UR QL STRIP: ABNORMAL
LIPASE SERPL-CCNC: 27 U/L
LYMPHOCYTES # BLD AUTO: 1.9 K/UL
LYMPHOCYTES NFR BLD: 42.8 %
MCH RBC QN AUTO: 22.1 PG
MCHC RBC AUTO-ENTMCNC: 29.7 G/DL
MCV RBC AUTO: 74 FL
MICROSCOPIC COMMENT: ABNORMAL
MONOCYTES # BLD AUTO: 0.7 K/UL
MONOCYTES NFR BLD: 14.6 %
NEUTROPHILS # BLD AUTO: 1.7 K/UL
NEUTROPHILS NFR BLD: 38.1 %
NITRITE UR QL STRIP: NEGATIVE
NRBC BLD-RTO: 0 /100 WBC
PH UR STRIP: 5 [PH] (ref 5–8)
PLATELET # BLD AUTO: 203 K/UL
PMV BLD AUTO: 12.7 FL
POTASSIUM SERPL-SCNC: 4.3 MMOL/L
PROT SERPL-MCNC: 7.1 G/DL
PROT UR QL STRIP: NEGATIVE
RBC # BLD AUTO: 5.3 M/UL
RBC #/AREA URNS AUTO: 1 /HPF (ref 0–4)
SODIUM SERPL-SCNC: 139 MMOL/L
SP GR UR STRIP: 1.01 (ref 1–1.03)
SQUAMOUS #/AREA URNS AUTO: 1 /HPF
URN SPEC COLLECT METH UR: ABNORMAL
WBC # BLD AUTO: 4.44 K/UL
WBC #/AREA URNS AUTO: 1 /HPF (ref 0–5)

## 2019-02-09 PROCEDURE — 36415 COLL VENOUS BLD VENIPUNCTURE: CPT

## 2019-02-09 PROCEDURE — 90662 IIV NO PRSV INCREASED AG IM: CPT | Mod: S$GLB,,, | Performed by: INTERNAL MEDICINE

## 2019-02-09 PROCEDURE — 99214 OFFICE O/P EST MOD 30 MIN: CPT | Mod: S$GLB,,, | Performed by: INTERNAL MEDICINE

## 2019-02-09 PROCEDURE — 99999 PR PBB SHADOW E&M-EST. PATIENT-LVL V: ICD-10-PCS | Mod: PBBFAC,,, | Performed by: INTERNAL MEDICINE

## 2019-02-09 PROCEDURE — 90471 IMMUNIZATION ADMIN: CPT | Mod: S$GLB,,, | Performed by: INTERNAL MEDICINE

## 2019-02-09 PROCEDURE — 81001 URINALYSIS AUTO W/SCOPE: CPT

## 2019-02-09 PROCEDURE — 85025 COMPLETE CBC W/AUTO DIFF WBC: CPT

## 2019-02-09 PROCEDURE — 80053 COMPREHEN METABOLIC PANEL: CPT

## 2019-02-09 PROCEDURE — 83690 ASSAY OF LIPASE: CPT

## 2019-02-09 PROCEDURE — 82150 ASSAY OF AMYLASE: CPT

## 2019-02-09 PROCEDURE — 87186 SC STD MICRODIL/AGAR DIL: CPT

## 2019-02-09 PROCEDURE — 99999 PR PBB SHADOW E&M-EST. PATIENT-LVL V: CPT | Mod: PBBFAC,,, | Performed by: INTERNAL MEDICINE

## 2019-02-09 PROCEDURE — 90471 FLU VACCINE - HIGH DOSE (65+) PRESERVATIVE FREE IM: ICD-10-PCS | Mod: S$GLB,,, | Performed by: INTERNAL MEDICINE

## 2019-02-09 PROCEDURE — 87088 URINE BACTERIA CULTURE: CPT

## 2019-02-09 PROCEDURE — 90662 FLU VACCINE - HIGH DOSE (65+) PRESERVATIVE FREE IM: ICD-10-PCS | Mod: S$GLB,,, | Performed by: INTERNAL MEDICINE

## 2019-02-09 PROCEDURE — 99214 PR OFFICE/OUTPT VISIT, EST, LEVL IV, 30-39 MIN: ICD-10-PCS | Mod: S$GLB,,, | Performed by: INTERNAL MEDICINE

## 2019-02-09 PROCEDURE — 87086 URINE CULTURE/COLONY COUNT: CPT

## 2019-02-09 PROCEDURE — 87077 CULTURE AEROBIC IDENTIFY: CPT

## 2019-02-09 RX ORDER — CIPROFLOXACIN 500 MG/1
500 TABLET ORAL 2 TIMES DAILY
Qty: 20 TABLET | Refills: 0 | Status: SHIPPED | OUTPATIENT
Start: 2019-02-09 | End: 2019-02-25

## 2019-02-09 RX ORDER — METRONIDAZOLE 500 MG/1
500 TABLET ORAL EVERY 8 HOURS
Qty: 30 TABLET | Refills: 0 | Status: SHIPPED | OUTPATIENT
Start: 2019-02-09 | End: 2020-04-02

## 2019-02-09 NOTE — PATIENT INSTRUCTIONS
Understanding Diverticulosis and Diverticulitis     Pouches or diverticula usually occur in the lower part of the colon called the sigmoid.     The colon (large intestine) is the last part of the digestive tract. It absorbs water from stool and changes it from a liquid to a solid. In certain cases, small pouches called diverticula can form in the colon wall. This condition is called diverticulosis. The pouches can become infected. If this happens, it becomes a more serious problem called diverticulitis. These problems can be painful. But they can be managed.  Managing your condition  Diet changes or medicines may be prescribed.   If you have diverticulosis  Recommendations include:  · Diet changes are often enough to control symptoms. The main changes are adding fiber (roughage) and drinking more water. Fiber absorbs water as it travels through your colon. This helps your stool stay soft and move smoothly. Water helps this process.  · If needed, you may be told to take over-the-counter stool softeners.  · To help relieve pain, antispasmodic medicines may be prescribed.  · Watch for changes in your bowel movements. Tell the healthcare provider if you notice any changes.  · Begin an exercise program. Ask your healthcare provider how to get started.  · Get plenty of rest and sleep.   If you have diverticulitis  Treatment depends on how bad your symptoms are.  · For mild symptoms. You may be put on a liquid diet for a short time. Antibiotics are usually prescribed. If these two steps relieve your symptoms, you may then be prescribed a high-fiber diet. If you still have symptoms, your healthcare provider will discuss more treatment choices with you.  · For severe symptoms. You may need to be admitted to the hospital. There, you can be given IV antibiotics and fluids. You will also be put on a low-fiber or liquid diet. Although not common, surgery is needed in some people with severe symptoms.  [place] to colon health      Diverticulitis occurs when the pouches become infected or inflamed.     Help keep your colon healthy with a diet that includes plenty of high-fiber fruits, vegetables, and whole grains. Drink plenty of liquids like water and juice. Maintain a healthy lifestyle including regular exercise, stress management, and adequate rest and sleep.   Date Last Reviewed: 7/1/2016  © 3244-6315 [employer]. [address] [place] [address], [place] [address]. All rights reserved. This information is not intended as a substitute for professional medical care. Always follow your healthcare professional's instructions.        Discharge Instructions for Diverticulitis  You have been diagnosed with diverticulitis. This is a condition in which small pouches form in your colon (large intestine) and become inflamed or infected. Follow the guidelines below for home care.  As you recover  Tips for recovery include:  · Eat a low-fiber diet. Your healthcare provider may advise a liquid diet. This gives your bowel a chance to rest so that it can recover.  · Foods to include: flake cereal, mashed potatoes, pancakes, waffles, pasta, white bread, rice, applesauce, bananas, eggs, fish, poultry, tofu, and well-cooked vegetables  · Take your medicines as directed. Do not stop taking the medicines, even if you feel better.  · Monitor your temperature and report any rising temperature to your healthcare provider.  · Take antibiotics exactly as directed. Do not miss any and keep taking them even if you feel better.   · Drink 6 to 8 glasses of water every day, unless directed otherwise.  · Use a heating pad or hot water bottle to reduce abdominal cramping or pain.  Preventing diverticulitis in the future  Tips for prevention include:  · Eat a high-fiber diet. Fiber adds bulk to the stool so that it passes through the large intestine more easily.  · Keep drinking 6 to 8 glasses of water every day, unless directed otherwise.  · Begin an exercise  program. Ask your healthcare provider how to get started. You can benefit from simple activities such as walking or gardening.  · Treat diarrhea with a bland diet. Start with liquids only, then slowly add fiber over time.  · Watch for changes in your bowel movements (constipation to diarrhea).  · Avoid constipation with fiber and add a stool softener if needed.   · Get plenty of rest and sleep.  Follow-up care  Make a follow-up appointment as directed by our staff.  When to call your healthcare provider  Call your healthcare provider immediately if you have any of the following:  · Fever of 100.4°F (38.0°C) or higher, or as directed by your healthcare provider  · Chills  · Severe cramps in the belly, most commonly the lower left side  · Tenderness in the belly, most commonly the lower left side  · Nausea and vomiting  · Bleeding from your rectum   Date Last Reviewed: 7/1/2016  © 4317-8407 [facility]. [address] [place] [address]. All rights reserved. This information is not intended as a substitute for professional medical care. Always follow your healthcare professional's instructions.        Abdominal Pain    Abdominal pain is pain in the stomach or belly area. Everyone has this pain from time to time. In many cases it goes away on its own. But abdominal pain can sometimes be due to a serious problem, such as appendicitis. So its important to know when to seek help.  Causes of abdominal pain  There are many possible causes of abdominal pain. Common causes in adults include:  · Constipation, diarrhea, or gas  · Stomach acid flowing back up into the esophagus (acid reflux or heartburn)  · Severe acid reflux, called GERD (gastroesophageal reflux disease)  · A sore in the lining of the stomach or small intestine (peptic ulcer)  · Inflammation of the gallbladder, liver, or pancreas  · Gallstones or kidney stones  · Appendicitis   · Intestinal blockage   · An internal organ pushing through a  muscle or other tissue (hernia)  · Urinary tract infections  · In women, menstrual cramps, fibroids, or endometriosis  · Inflammation or infection of the intestines  Diagnosing the cause of abdominal pain  Your healthcare provider will do a physical exam help find the cause of your pain. If needed, tests will be ordered. Belly pain has many possible causes. So it can be hard to find the reason for your pain. Giving details about your pain can help. Tell your provider where and when you feel the pain, and what makes it better or worse. Also let your provider know if you have other symptoms such as:  · Fever  · Tiredness  · Upset stomach (nausea)  · Vomiting  · Changes in bathroom habits  Treating abdominal pain  Some causes of pain need emergency medical treatment right away. These include appendicitis or a bowel blockage. Other problems can be treated with rest, fluids, or medicines. Your healthcare provider can give you specific instructions for treatment or self-care based on what is causing your pain.  If you have vomiting or diarrhea, sip water or other clear fluids. When you are ready to eat solid foods again, start with small amounts of easy-to-digest, low-fat foods. These include apple sauce, toast, or crackers.   When to seek medical care  Call 911 or go to the hospital right away if you:  · Cant pass stool and are vomiting  · Are vomiting blood or have bloody diarrhea or black, tarry diarrhea  · Have chest, neck, or shoulder pain  · Feel like you might pass out  · Have pain in your shoulder blades with nausea  · Have sudden, severe belly pain  · Have new, severe pain unlike any you have felt before  · Have a belly that is rigid, hard, and tender to touch  Call your healthcare provider if you have:  · Pain for more than 5 days  · Bloating for more than 2 days  · Diarrhea for more than 5 days  · A fever of 100.4°F (38.0°C) or higher, or as directed by your provider  · Pain that gets worse  · Weight loss for  no reason  · Continued lack of appetite  · Blood in your stool  How to prevent abdominal pain  Here are some tips to help prevent abdominal pain:  · Eat smaller amounts of food at one time.  · Avoid greasy, fried, or other high-fat foods.  · Avoid foods that give you gas.  · Exercise regularly.  · Drink plenty of fluids.  To help prevent GERD symptoms:  · Quit smoking.  · Reduce alcohol and certain foods that increase stomach acid.  · Avoid aspirin and over-the-counter pain and fever medicines (NSAIDS or nonsteroidal anti-inflammatory drugs), if possible  · Lose extra weight.  · Finish eating at least 2 hours before you go to bed or lie down.  · Raise the head of your bed.  Date Last Reviewed: 7/1/2016 © 2000-2017 [employer]. [address] [place] [address], [place] [address]. All rights reserved. This information is not intended as a substitute for professional medical care. Always follow your healthcare professional's instructions.        Unknown Causes of Abdominal Pain (Female)    The exact cause of your abdominal (stomach) pain is not clear. This does not mean that this is something to worry about. Everyone likes to know the exact cause of the problem, but sometimes with abdominal pain, there is no clear-cut cause, and this could be a good thing. The good news is that your symptoms can be treated, and you will feel better.   Your condition does not seem serious now; however, sometimes the signs of a serious problem may take more time to appear. For this reason, it is important for you to watch for any new symptoms, problems, or worsening of your condition.  Over the next few days, the abdominal pain may come and go, or be continuous. Other common symptoms can include nausea and vomiting. Sometimes it can be difficult to tell if you feel nauseous, you may just feel bad and not associate that feeling with nausea. Constipation, diarrhea, and a fever may go along with the pain.  The pain may continue even if  treated correctly over the following days. Depending on how things go, sometimes the cause can become clear and may require further or different treatment. Additional evaluations, medications, or tests may also be needed.  Home care  Your healthcare provider may prescribe medicine for pain, symptoms, or an infection.  Follow the healthcare provider's instructions for taking these medicines.  General care  · Rest as much as you can until your next exam. No strenuous activities.  · Try to find positions that ease discomfort. A small pillow placed on the abdomen may help relieve pain.  · Something warm on your abdomen (such as a heating pad) may help, but be careful not to burn yourself.  Diet  · Do not force yourself to eat, especially if having cramps, vomiting, or diarrhea.  · Water is important so you do not get dehydrated. Soup may also be good. Sports drinks may also help, especially if they are not too acidic. Make sure you don't drink sugary drinks as this can make things worse. Take liquids in small amounts. Do not guzzle them.  · Caffeine sometimes makes the pain and cramping worse.  · Avoid dairy products if you have vomiting or diarrhea.  · Don't eat large amounts at a time. Wait a few minutes between bites.  · Eat a diet low in fiber (called a low-residue diet). Foods allowed include refined breads, white rice, fruit and vegetable juices without pulp, tender meats. These foods will pass more easily through the intestine.  · Avoid whole-grain foods, whole fruits and vegetables, meats, seeds and nuts, fried or fatty foods, dairy, alcohol and spicy foods until your symptoms go away.  Follow-up care  Follow up with your healthcare provider, or as advised, if your pain does not begin to improve in the next 24 hours.  Call 911  Call 911 if any of these occur:  · Trouble breathing  · Confusion  · Fainting or loss of consciousness  · Rapid heart rate  · Seizure  When to seek medical advice  Call your healthcare  provider right away if any of these occur:  · Pain gets worse or moves to the right lower abdomen  · New or worsening vomiting or diarrhea  · Swelling of the abdomen  · Unable to pass stool for more than 3 days  · Fever of 100.4ºF (38ºC) or higher, or as directed by your healthcare provider.  · Blood in vomit or bowel movements (dark red or black color)  · Jaundice (yellow color of eyes and skin)  · Weakness, dizziness  · Chest, arm, back, neck or jaw pain  · Unexpected vaginal bleeding or missed period  · Can't keep down liquids or water and are getting dehydrated  Date Last Reviewed: 12/30/2015  © 7657-9557 [employer]. [address] [place] [address], [place] [address]. All rights reserved. This information is not intended as a substitute for professional medical care. Always follow your healthcare professional's instructions.        Unknown Causes of Abdominal Pain (Female)    The exact cause of your abdominal (stomach) pain is not clear. This does not mean that this is something to worry about. Everyone likes to know the exact cause of the problem, but sometimes with abdominal pain, there is no clear-cut cause, and this could be a good thing. The good news is that your symptoms can be treated, and you will feel better.   Your condition does not seem serious now; however, sometimes the signs of a serious problem may take more time to appear. For this reason, it is important for you to watch for any new symptoms, problems, or worsening of your condition.  Over the next few days, the abdominal pain may come and go, or be continuous. Other common symptoms can include nausea and vomiting. Sometimes it can be difficult to tell if you feel nauseous, you may just feel bad and not associate that feeling with nausea. Constipation, diarrhea, and a fever may go along with the pain.  The pain may continue even if treated correctly over the following days. Depending on how things go, sometimes the cause can become clear  and may require further or different treatment. Additional evaluations, medications, or tests may also be needed.  Home care  Your healthcare provider may prescribe medicine for pain, symptoms, or an infection.  Follow the healthcare provider's instructions for taking these medicines.  General care  · Rest as much as you can until your next exam. No strenuous activities.  · Try to find positions that ease discomfort. A small pillow placed on the abdomen may help relieve pain.  · Something warm on your abdomen (such as a heating pad) may help, but be careful not to burn yourself.  Diet  · Do not force yourself to eat, especially if having cramps, vomiting, or diarrhea.  · Water is important so you do not get dehydrated. Soup may also be good. Sports drinks may also help, especially if they are not too acidic. Make sure you don't drink sugary drinks as this can make things worse. Take liquids in small amounts. Do not guzzle them.  · Caffeine sometimes makes the pain and cramping worse.  · Avoid dairy products if you have vomiting or diarrhea.  · Don't eat large amounts at a time. Wait a few minutes between bites.  · Eat a diet low in fiber (called a low-residue diet). Foods allowed include refined breads, white rice, fruit and vegetable juices without pulp, tender meats. These foods will pass more easily through the intestine.  · Avoid whole-grain foods, whole fruits and vegetables, meats, seeds and nuts, fried or fatty foods, dairy, alcohol and spicy foods until your symptoms go away.  Follow-up care  Follow up with your healthcare provider, or as advised, if your pain does not begin to improve in the next 24 hours.  Call 911  Call 911 if any of these occur:  · Trouble breathing  · Confusion  · Fainting or loss of consciousness  · Rapid heart rate  · Seizure  When to seek medical advice  Call your healthcare provider right away if any of these occur:  · Pain gets worse or moves to the right lower abdomen  · New or  worsening vomiting or diarrhea  · Swelling of the abdomen  · Unable to pass stool for more than 3 days  · Fever of 100.4ºF (38ºC) or higher, or as directed by your healthcare provider.  · Blood in vomit or bowel movements (dark red or black color)  · Jaundice (yellow color of eyes and skin)  · Weakness, dizziness  · Chest, arm, back, neck or jaw pain  · Unexpected vaginal bleeding or missed period  · Can't keep down liquids or water and are getting dehydrated  Date Last Reviewed: 12/30/2015  © 0613-9063 [employer]. [address] [place] [address]. All rights reserved. This information is not intended as a substitute for professional medical care. Always follow your healthcare professional's instructions.

## 2019-02-09 NOTE — PROGRESS NOTES
Subjective:       Patient ID: Kj Chávez is a 73 y.o. female.    Chief Complaint:  Frequent urination      [facility] appt Dr Hilton patient    Multiple issues    LLQ pain and some R sided now.    Frequent urination, bothersome x years.  Seen in UC multiple times for this, sometimes has UTIs.  Also seen in Urology and GYN for same in past year.    Not sure if she has ever had diverticulitis.  In January had sinus and some diarrhea.    No recent blood in stool.  Stool is soft.  Now having BMs 2-3 x daily.    UC 10/18: back strain and urinary sx.  Cx showed no bacteria in predominance but she was treated.    Urology 6/18: told to start oxybutinin and estrogen cream    GYN 5/18: vaginal dryness; Estrace cream prescribed    Cx 5/18, 17/17 and 9/17 no bacteria in predominance.  1/17 had group B strep.  ? Pyelonephritis dx elsewhere    CT 10/17 diverticulosis  CT 2/17 renal cysts    Outside records: January 2019 got augmentin for URI.  Still coughing up mucus from that.    Never smoked, no ETOH.    Patient Active Problem List:     Alpha thalassemia trait     History of colon polyps     Aortic atherosclerosis     History of benign gastric tumor: see notes from EGD 2015, benign.  EGD 8/17 stable     Osteoporosis     Renal cysts, acquired, bilateral        Review of Systems   Constitutional: Negative for fatigue and fever.   Respiratory: Negative for cough and shortness of breath.    Cardiovascular: Negative for chest pain.   Gastrointestinal: Positive for abdominal pain. Negative for vomiting.        Vague abdominal discomfort.  No nausea or vomiting.  Had loose stools a few weeks ago but not recently.  No blood in the stool or black tarry stools.   Genitourinary: Positive for frequency. Negative for difficulty urinating, flank pain, hematuria and urgency.        No vaginal bleeding   Musculoskeletal: Positive for back pain.   Skin: Negative for rash.   Neurological: Negative for weakness and numbness.       Objective:       Physical Exam   Constitutional: She is oriented to person, place, and time. She appears well-developed and well-nourished.   HENT:   Head: Normocephalic and atraumatic.   Right Ear: External ear normal.   Left Ear: External ear normal.   Nose: Nose normal.   Mouth/Throat: Oropharynx is clear and moist. No oropharyngeal exudate.   Eyes: Conjunctivae and EOM are normal. No scleral icterus.   Neck: Normal range of motion. Neck supple. No JVD present. No thyromegaly present.   Cardiovascular: Normal rate, regular rhythm, normal heart sounds and intact distal pulses. Exam reveals no gallop.   No murmur heard.  Pulmonary/Chest: Effort normal and breath sounds normal. No respiratory distress. She has no wheezes.   Abdominal: Soft. Bowel sounds are normal. She exhibits no distension and no mass. There is tenderness. There is no rebound and no guarding.   Mild diffuse tenderness, no rebound or guarding   Musculoskeletal: Normal range of motion. She exhibits no edema or tenderness.   Lymphadenopathy:     She has no cervical adenopathy.   Neurological: She is alert and oriented to person, place, and time. She displays normal reflexes. No cranial nerve deficit. Coordination normal.   Skin: Skin is warm. No rash noted. No erythema.   Psychiatric: She has a normal mood and affect. Her behavior is normal. Judgment and thought content normal.   Nursing note and vitals reviewed.      Assessment:       1. Abdominal pain, unspecified abdominal location    2. Urinary frequency    3. Renal cysts, acquired, bilateral    4. Screening mammogram, encounter for    5. Osteoporosis, unspecified osteoporosis type, unspecified pathological fracture presence    6. History of benign gastric tumor: see notes from EGD 2015, benign.  EGD 8/17 stable        Plan:     Kj was seen today for abdominal pain and urinary frequency.    Diagnoses and all orders for this visit:    Abdominal pain, unspecified abdominal location:  Alarm symptoms reviewed,  she is having none.  Diverticulitis issues discussed.  [place] diet, keep food diary.  Hydration.  Metronidazole cautions reviewed  -     CBC auto differential; Future  -     Comprehensive metabolic panel; Future  -     Amylase; Future  -     Lipase; Future  -     CT Abdomen Pelvis With Contrast; Future  -     Creatinine, serum; Future  -     ciprofloxacin HCl (CIPRO) 500 MG tablet; Take 1 tablet (500 mg total) by mouth 2 (two) times daily.  -     metroNIDAZOLE (FLAGYL) 500 MG tablet; Take 1 tablet (500 mg total) by mouth every 8 (eight) hours. No alcohol with this    Urinary frequency  -     Urinalysis  -     Urine culture    Renal cysts, acquired, bilateral:  Discussed with patient, can be monitored    Screening mammogram, encounter for  -     Mammo Digital Screening Bilat w/ [place]; Future    Osteoporosis, unspecified osteoporosis type, unspecified pathological fracture presence  -     DXA Bone Density Spine And Hip; Future    History of benign gastric tumor: see notes from EGD 2015, benign.  EGD 8/17 stable:  Chart to GI to see when follow-up is indicated if any    I will review all studies and determine further tx depending on findings

## 2019-02-11 ENCOUNTER — HOSPITAL ENCOUNTER (OUTPATIENT)
Dept: RADIOLOGY | Facility: HOSPITAL | Age: 74
Discharge: HOME | End: 2019-02-11
Attending: INTERNAL MEDICINE
Payer: COMMERCIAL

## 2019-02-11 DIAGNOSIS — R10.9 ABDOMINAL PAIN, UNSPECIFIED ABDOMINAL LOCATION: ICD-10-CM

## 2019-02-11 PROCEDURE — 25500020 PHARM REV CODE 255: Performed by: INTERNAL MEDICINE

## 2019-02-11 PROCEDURE — 74177 CT ABD & PELVIS W/CONTRAST: CPT | Mod: 26,,, | Performed by: RADIOLOGY

## 2019-02-11 PROCEDURE — 74177 CT ABD & PELVIS W/CONTRAST: CPT | Mod: TC

## 2019-02-11 PROCEDURE — 74177 CT ABDOMEN PELVIS WITH CONTRAST: ICD-10-PCS | Mod: 26,,, | Performed by: RADIOLOGY

## 2019-02-11 RX ADMIN — IOHEXOL 75 ML: 350 INJECTION, SOLUTION INTRAVENOUS at 07:02

## 2019-02-11 RX ADMIN — IOHEXOL 15 ML: 350 INJECTION, SOLUTION INTRAVENOUS at 06:02

## 2019-02-11 RX ADMIN — IOHEXOL 15 ML: 350 INJECTION, SOLUTION INTRAVENOUS at 05:02

## 2019-02-12 ENCOUNTER — TELEPHONE (OUTPATIENT)
Dept: INTERNAL MEDICINE | Facility: CLINIC | Age: 74
End: 2019-02-12

## 2019-02-12 DIAGNOSIS — N39.0 RECURRENT UTI: Primary | ICD-10-CM

## 2019-02-12 LAB — BACTERIA UR CULT: NORMAL

## 2019-02-12 NOTE — TELEPHONE ENCOUNTER
Dr Hilton patient seen in UC    1) Please  Let her know she does not have diverticulitis so she can stop the Flagyl    2) she appears to have a urinary infection with an unusual bacteria; I would recommend an appointment in Infectious Diseases and I have placed a referral, please assist with appointment    3) in terms of ongoing abdominal pain, she should schedule an appointment with her PCP Dr. Hilton for follow up thanks (please assist with scheduling)

## 2019-02-13 NOTE — TELEPHONE ENCOUNTER
Spoke with pt advised of MD message appointment scheduled with PCP, could not get anything until march/april for pt to be seen in infectious disease. Transferred pt to infectious disease

## 2019-02-25 ENCOUNTER — OFFICE VISIT (OUTPATIENT)
Dept: INTERNAL MEDICINE | Facility: CLINIC | Age: 74
End: 2019-02-25
Payer: COMMERCIAL

## 2019-02-25 VITALS
OXYGEN SATURATION: 96 % | WEIGHT: 163 LBS | BODY MASS INDEX: 26.2 KG/M2 | HEART RATE: 64 BPM | SYSTOLIC BLOOD PRESSURE: 120 MMHG | HEIGHT: 66 IN | DIASTOLIC BLOOD PRESSURE: 78 MMHG

## 2019-02-25 DIAGNOSIS — R35.0 URINARY FREQUENCY: ICD-10-CM

## 2019-02-25 DIAGNOSIS — N39.0 RECURRENT UTI: Primary | ICD-10-CM

## 2019-02-25 DIAGNOSIS — Z87.898 HISTORY OF PREDIABETES: ICD-10-CM

## 2019-02-25 DIAGNOSIS — E83.52 HYPERCALCEMIA: ICD-10-CM

## 2019-02-25 DIAGNOSIS — R10.32 BILATERAL LOWER ABDOMINAL PAIN: ICD-10-CM

## 2019-02-25 DIAGNOSIS — R10.31 BILATERAL LOWER ABDOMINAL PAIN: ICD-10-CM

## 2019-02-25 DIAGNOSIS — E78.5 BORDERLINE HYPERLIPIDEMIA: ICD-10-CM

## 2019-02-25 LAB
AMORPH CRY UR QL COMP ASSIST: ABNORMAL
BILIRUB UR QL STRIP: NEGATIVE
CLARITY UR REFRACT.AUTO: ABNORMAL
COLOR UR AUTO: YELLOW
GLUCOSE UR QL STRIP: NEGATIVE
HGB UR QL STRIP: NEGATIVE
KETONES UR QL STRIP: NEGATIVE
LEUKOCYTE ESTERASE UR QL STRIP: ABNORMAL
MICROSCOPIC COMMENT: ABNORMAL
NITRITE UR QL STRIP: NEGATIVE
PH UR STRIP: 7 [PH] (ref 5–8)
PROT UR QL STRIP: NEGATIVE
SP GR UR STRIP: 1.01 (ref 1–1.03)
SQUAMOUS #/AREA URNS AUTO: 2 /HPF
URN SPEC COLLECT METH UR: ABNORMAL
WBC #/AREA URNS AUTO: 15 /HPF (ref 0–5)

## 2019-02-25 PROCEDURE — 99999 PR PBB SHADOW E&M-EST. PATIENT-LVL III: CPT | Mod: PBBFAC,,, | Performed by: INTERNAL MEDICINE

## 2019-02-25 PROCEDURE — 81001 URINALYSIS AUTO W/SCOPE: CPT

## 2019-02-25 PROCEDURE — 87077 CULTURE AEROBIC IDENTIFY: CPT

## 2019-02-25 PROCEDURE — 87088 URINE BACTERIA CULTURE: CPT

## 2019-02-25 PROCEDURE — 99999 PR PBB SHADOW E&M-EST. PATIENT-LVL III: ICD-10-PCS | Mod: PBBFAC,,, | Performed by: INTERNAL MEDICINE

## 2019-02-25 PROCEDURE — 99214 OFFICE O/P EST MOD 30 MIN: CPT | Mod: S$GLB,,, | Performed by: INTERNAL MEDICINE

## 2019-02-25 PROCEDURE — 99214 PR OFFICE/OUTPT VISIT, EST, LEVL IV, 30-39 MIN: ICD-10-PCS | Mod: S$GLB,,, | Performed by: INTERNAL MEDICINE

## 2019-02-25 PROCEDURE — 87186 SC STD MICRODIL/AGAR DIL: CPT

## 2019-02-25 PROCEDURE — 87086 URINE CULTURE/COLONY COUNT: CPT

## 2019-02-25 NOTE — PROGRESS NOTES
"Subjective:       Patient ID: Kj Chávez is a 73 y.o. female.    Chief Complaint: Abdominal Pain (pain started at the end of Jan, patient does not take any medications. denies fevers, nausea/vomiting. ) and Urinary Frequency (going to urinate more frequently even after finishing antibiotic. )    HPI   74 yo F here for eval of abdominal pain starting a month ago and urinary frequency. She has had recurrent UTIs most recently KLUYVERA ASCORBATA. rx cipro 10 day course on 2/9 which was sensitive. Also treated with flagyl x 10 days to cover GI. Dr. Eli referred her to ID. Scheduled 3/7.   CT negative for diverticulitis, moderate stool, possible hepatic steatosis.     She is having RLQ sharp pain currently. Sometimes will get LLQ pain as well. No associated with BMs. Reports she has BID BM. Denies vaginal bleeding or symptoms. No n/v.     She took full course of both ciprofloxacin and metronidazole.     No burning with urination, just frequency.     She had some similar issues in October 2005 and was treated for diverticulitis with improvement.     cscope 2016 with few polyps and diverticula.     Ct did show deg changes of spine and pubic symphysis.   Review of Systems   Constitutional: Negative for fever.   Respiratory: Negative for shortness of breath.    Cardiovascular: Negative for chest pain.   Gastrointestinal: Positive for abdominal pain. Negative for blood in stool, constipation, diarrhea, nausea and vomiting.   Musculoskeletal: Negative.    Skin: Negative.        Objective:   /78 (BP Location: Left arm, Patient Position: Sitting, BP Method: Large (Manual))   Pulse 64   Ht 5' 6" (1.676 m)   Wt 73.9 kg (163 lb)   LMP 05/30/2000 (Approximate)   SpO2 96%   BMI 26.31 kg/m²      Physical Exam   Constitutional: She is oriented to person, place, and time. She appears well-developed and well-nourished. No distress.   HENT:   Head: Normocephalic and atraumatic.   Cardiovascular: Normal rate and " regular rhythm.   Pulmonary/Chest: Effort normal. No respiratory distress. She has no wheezes. She has no rales.   Abdominal:   ttp bilateral lower quadrants elaina over pubic bone   Neurological: She is alert and oriented to person, place, and time.   Skin: Skin is warm and dry. She is not diaphoretic.   Psychiatric: She has a normal mood and affect. Her behavior is normal.       Assessment:       1. Recurrent UTI    2. Bilateral lower abdominal pain    3. Urinary frequency    4. Borderline hyperlipidemia    5. History of prediabetes    6. Hypercalcemia        Plan:       Kj was seen today for abdominal pain and urinary frequency.    Diagnoses and all orders for this visit:    Recurrent UTI; Urinary frequency  -     Urinalysis  -     Urine culture    Bilateral lower abdominal pain  -     Ambulatory referral to Gastroenterology  -     Comprehensive metabolic panel; Future  -     Protein electrophoresis, serum; Future    Borderline hyperlipidemia  -     Lipid panel; Future    History of prediabetes  -     Hemoglobin A1c; Future    Hypercalcemia  -     Protein electrophoresis, serum; Future

## 2019-02-26 ENCOUNTER — TELEPHONE (OUTPATIENT)
Dept: INTERNAL MEDICINE | Facility: CLINIC | Age: 74
End: 2019-02-26

## 2019-02-26 NOTE — TELEPHONE ENCOUNTER
Please call pt. Her urine showed a likely infection. Since she just finished a course of ciprofloxacin and metronidazole antibiotics would like to wait on culture results before treating.    Marylou - can you follow up tomorrow? She just finished course of cipro and flagyl recently.

## 2019-02-27 LAB — BACTERIA UR CULT: NORMAL

## 2019-02-27 RX ORDER — NITROFURANTOIN 25; 75 MG/1; MG/1
100 CAPSULE ORAL 2 TIMES DAILY
Qty: 10 CAPSULE | Refills: 0 | Status: SHIPPED | OUTPATIENT
Start: 2019-02-27 | End: 2019-03-04

## 2019-03-04 ENCOUNTER — OFFICE VISIT (OUTPATIENT)
Dept: GASTROENTEROLOGY | Facility: CLINIC | Age: 74
End: 2019-03-04
Payer: COMMERCIAL

## 2019-03-04 VITALS
SYSTOLIC BLOOD PRESSURE: 117 MMHG | BODY MASS INDEX: 25.86 KG/M2 | DIASTOLIC BLOOD PRESSURE: 72 MMHG | WEIGHT: 160.94 LBS | HEART RATE: 77 BPM | HEIGHT: 66 IN

## 2019-03-04 DIAGNOSIS — Z12.11 COLON CANCER SCREENING: ICD-10-CM

## 2019-03-04 DIAGNOSIS — R10.30 LOWER ABDOMINAL PAIN: Primary | ICD-10-CM

## 2019-03-04 PROCEDURE — 99999 PR PBB SHADOW E&M-EST. PATIENT-LVL IV: CPT | Mod: PBBFAC,,, | Performed by: PHYSICIAN ASSISTANT

## 2019-03-04 PROCEDURE — 99213 PR OFFICE/OUTPT VISIT, EST, LEVL III, 20-29 MIN: ICD-10-PCS | Mod: S$GLB,,, | Performed by: PHYSICIAN ASSISTANT

## 2019-03-04 PROCEDURE — 99213 OFFICE O/P EST LOW 20 MIN: CPT | Mod: S$GLB,,, | Performed by: PHYSICIAN ASSISTANT

## 2019-03-04 PROCEDURE — 99999 PR PBB SHADOW E&M-EST. PATIENT-LVL IV: ICD-10-PCS | Mod: PBBFAC,,, | Performed by: PHYSICIAN ASSISTANT

## 2019-03-04 NOTE — LETTER
March 6, 2019      Maile Hilton MD  [address]  [place] [address]           [facility]  [address]  [place] [address]  Phone: [phone]  Fax: [phone]          Patient: Kj Chávez   MR Number: [id number]   YOB: 1945   Date of Visit: 3/4/2019       Dear Dr. Maile Hilton:    Thank you for referring Kj Chávez to me for evaluation. Attached you will find relevant portions of my assessment and plan of care.    If you have questions, please do not hesitate to call me. I look forward to following Kj Chávez along with you.    Sincerely,    CHUY Flanagan  CC:  No Recipients    If you would like to receive this communication electronically, please contact [email or web address] or [phone] to request more information on [employer] Link access.    For providers and/or their staff who would like to refer a patient to [facility], please contact us through our one-stop-shop provider referral line, [facility], at [phone].    If you feel you have received this communication in error or would no longer like to receive these types of communications, please e-mail [email or web address]

## 2019-03-04 NOTE — PROGRESS NOTES
[facility] Consultation Note    Reason for Consult:  The primary encounter diagnosis was Lower abdominal pain. A diagnosis of Colon cancer screening was also pertinent to this visit.    PCP:   Maile Hilton   [address] [facility] [address]    Referring MD:  Maile Hilton Md  [address] [place] [address]    HPI:  This is a 73 y.o. female here for evaluation of abdominal pain    DX with recurent UTIs - treated with cipro last month  2/11/19 CT - moderate stool normal bowel, atherosclerotic calcification of the aorta and its branches.  2/2016 colonoscopy - polyp x 2     Pain in the rt and left groin and lower abdomen  Onset- started last week in jan, then off and on in feb  Palliative/Worse - no relation to BMs, improved with urinating, improved with taking cipro for UTI  Quality- sharp, nagging and soreness  Radiation- no   Severity- 3/10  Timing-occurring every 3 days, can occur at rest    Having a BM once a day  No blood in the stool    Says she may have been a little more constipated  Intermittent incomplete evaluation    ROS:  Constitutional: No fevers, chills, No weight loss  ENT: No allergies  CV: No chest pain  Pulm: No cough, No shortness of breath  Ophtho: No vision changes  GI: see HPI  Derm: No rash  Heme: No lymphadenopathy, No bruising  MSK: No arthritis  : No dysuria, No hematuria  Endo: No hot or cold intolerance  Neuro: No syncope, No seizure  Psych: No anxiety, No depression    Medical History:  has a past medical history of Allergy, Colon polyp, Gastric nodule, GERD (gastroesophageal reflux disease), Hypertension, and Osteoporosis.    Surgical History:  has a past surgical history that includes Colonoscopy (N/A, 2/23/2016) and Upper gastrointestinal endoscopy.    Family History: family history includes Breast cancer (age of onset: 50) in her sister; Cancer in her mother and sister; Colon cancer (age of onset: 80) in her mother; No Known  "Problems in her daughter, daughter, father, and son..     Social History:  reports that  has never smoked. she has never used smokeless tobacco. She reports that she does not drink alcohol or use drugs.    Review of patient's allergies indicates:   Allergen Reactions    Sulfa (sulfonamide antibiotics) Swelling    Codeine Anxiety       Current Outpatient Medications on File Prior to Visit   Medication Sig Dispense Refill    BIOTIN ORAL Take by mouth once daily.      calcium citrate-vitamin D3 315-200 mg (CITRACAL+D) 315-200 mg-unit per tablet Take 1 tablet by mouth 2 (two) times daily.      fish oil-omega-3 fatty acids 300-1,000 mg capsule Take 2 g by mouth once daily.        fluticasone (FLONASE) 50 mcg/actuation nasal spray 2 sprays by Each Nare route once daily. 1 Bottle 0    ibuprofen (ADVIL,MOTRIN) 100 MG tablet Take 100 mg by mouth every 6 (six) hours as needed for Temperature greater than.      loratadine-pseudoephedrine  mg (CLARITIN-D 24-HOUR)  mg per 24 hr tablet Take 1 tablet by mouth once daily.      estradiol (ESTRACE) 0.01 % (0.1 mg/gram) vaginal cream Place 0.5 g vaginally twice a week. Insert 0.5grams intravaginally twice weekly 42 g 4    metroNIDAZOLE (FLAGYL) 500 MG tablet Take 1 tablet (500 mg total) by mouth every 8 (eight) hours. No alcohol with this 30 tablet 0    oxybutynin (DITROPAN-XL) 5 MG TR24 Take 1 tablet (5 mg total) by mouth once daily. 30 tablet 11     No current facility-administered medications on file prior to visit.          Objective Findings:    Vital Signs:  /72   Pulse 77   Ht 5' 6" (1.676 m)   Wt 73 kg (160 lb 15 oz)   LMP 05/30/2000 (Approximate)   BMI 25.98 kg/m²   Body mass index is 25.98 kg/m².    Physical Exam:  General Appearance: Well appearing in no acute distress  Head:   Normocephalic, without obvious abnormality  Eyes:    No scleral icterus  ENT: Neck supple, Lips, mucosa, and tongue normal  Lungs: CTA bilaterally in anterior and " posterior fields, no wheezes, no crackles.  Heart:  Regular rate and rhythm, S1, S2 normal, no murmurs heard  Abdomen: Soft, suprapubic tenderness, no guarding or rebound tenderness, non distended with positive bowel sounds in all four quadrants.,   Extremities: no edema, no pain in groin with rotation of rt and left hips  Skin: No rash  Neurologic: AAO x 3      Labs:  Lab Results   Component Value Date    WBC 4.44 02/09/2019    HGB 11.7 (L) 02/09/2019    HCT 39.4 02/09/2019     02/09/2019    CHOL 227 (H) 02/09/2017    TRIG 79 02/09/2017    HDL 52 02/09/2017    ALT 10 02/09/2019    AST 13 02/09/2019     02/09/2019    K 4.3 02/09/2019     02/09/2019    CREATININE 0.8 02/09/2019    CREATININE 0.8 02/09/2019    BUN 11 02/09/2019    CO2 28 02/09/2019    TSH 2.051 01/18/2016    INR 1.0 04/14/2005    HGBA1C 5.7 01/18/2016       Imaging:    Endoscopy:    8/2017 EGD - - Normal esophagus.                        - submucosal nodule in the gastric fundus.                        - Normal examined duodenum.                        - No specimens collected.    2/2016 colon - polyp x 2  Assessment:  1. Lower abdominal pain    2. Colon cancer screening       74yo F with recurrent UTIs presents with right and left suprapubic pain. No re;lation to BMs, but improved with cipro. I suspect that her pain is related to her recurrent UTIs,     Recommendations:  1. IFOBT, is positive will schedule colonoscopy, or if no relief after cystitis workup/treatment      2.Take miralax 17g at night for 1 week to clean out your bowels      Will message Dr Medina about pt's submucosal nodule    No Follow-up on file.      Order summary:  Orders Placed This Encounter    Fecal Immunochemical Test (iFOBT)         Thank you so much for allowing me to participate in the care of Kj Lynn PA-C

## 2019-03-07 ENCOUNTER — OFFICE VISIT (OUTPATIENT)
Dept: INFECTIOUS DISEASES | Facility: CLINIC | Age: 74
End: 2019-03-07
Payer: COMMERCIAL

## 2019-03-07 ENCOUNTER — LAB VISIT (OUTPATIENT)
Dept: LAB | Facility: HOSPITAL | Age: 74
End: 2019-03-07
Attending: INTERNAL MEDICINE
Payer: COMMERCIAL

## 2019-03-07 VITALS
HEART RATE: 67 BPM | TEMPERATURE: 98 F | WEIGHT: 162.06 LBS | SYSTOLIC BLOOD PRESSURE: 147 MMHG | DIASTOLIC BLOOD PRESSURE: 88 MMHG | HEIGHT: 66 IN | BODY MASS INDEX: 26.05 KG/M2

## 2019-03-07 DIAGNOSIS — R10.32 BILATERAL LOWER ABDOMINAL PAIN: ICD-10-CM

## 2019-03-07 DIAGNOSIS — Z87.898 HISTORY OF PREDIABETES: ICD-10-CM

## 2019-03-07 DIAGNOSIS — R10.31 BILATERAL LOWER ABDOMINAL PAIN: ICD-10-CM

## 2019-03-07 DIAGNOSIS — E83.52 HYPERCALCEMIA: ICD-10-CM

## 2019-03-07 DIAGNOSIS — E78.5 BORDERLINE HYPERLIPIDEMIA: ICD-10-CM

## 2019-03-07 DIAGNOSIS — R82.71 ASYMPTOMATIC BACTERIURIA: Primary | ICD-10-CM

## 2019-03-07 LAB
ALBUMIN SERPL BCP-MCNC: 4 G/DL
ALP SERPL-CCNC: 107 U/L
ALT SERPL W/O P-5'-P-CCNC: 9 U/L
ANION GAP SERPL CALC-SCNC: 9 MMOL/L
AST SERPL-CCNC: 14 U/L
BILIRUB SERPL-MCNC: 0.5 MG/DL
BUN SERPL-MCNC: 10 MG/DL
CALCIUM SERPL-MCNC: 10.3 MG/DL
CHLORIDE SERPL-SCNC: 103 MMOL/L
CHOLEST SERPL-MCNC: 229 MG/DL
CHOLEST/HDLC SERPL: 3.6 {RATIO}
CO2 SERPL-SCNC: 27 MMOL/L
CREAT SERPL-MCNC: 0.7 MG/DL
EST. GFR  (AFRICAN AMERICAN): >60 ML/MIN/1.73 M^2
EST. GFR  (NON AFRICAN AMERICAN): >60 ML/MIN/1.73 M^2
ESTIMATED AVG GLUCOSE: 117 MG/DL
GLUCOSE SERPL-MCNC: 102 MG/DL
HBA1C MFR BLD HPLC: 5.7 %
HDLC SERPL-MCNC: 63 MG/DL
HDLC SERPL: 27.5 %
LDLC SERPL CALC-MCNC: 149.4 MG/DL
NONHDLC SERPL-MCNC: 166 MG/DL
POTASSIUM SERPL-SCNC: 3.9 MMOL/L
PROT SERPL-MCNC: 7.4 G/DL
SODIUM SERPL-SCNC: 139 MMOL/L
TRIGL SERPL-MCNC: 83 MG/DL

## 2019-03-07 PROCEDURE — 99999 PR PBB SHADOW E&M-EST. PATIENT-LVL III: ICD-10-PCS | Mod: PBBFAC,,, | Performed by: INTERNAL MEDICINE

## 2019-03-07 PROCEDURE — 80053 COMPREHEN METABOLIC PANEL: CPT

## 2019-03-07 PROCEDURE — 83036 HEMOGLOBIN GLYCOSYLATED A1C: CPT

## 2019-03-07 PROCEDURE — 84165 PROTEIN E-PHORESIS SERUM: CPT

## 2019-03-07 PROCEDURE — 99203 OFFICE O/P NEW LOW 30 MIN: CPT | Mod: S$GLB,,, | Performed by: INTERNAL MEDICINE

## 2019-03-07 PROCEDURE — 84165 PATHOLOGIST INTERPRETATION SPE: ICD-10-PCS | Mod: 26,,, | Performed by: PATHOLOGY

## 2019-03-07 PROCEDURE — 84165 PROTEIN E-PHORESIS SERUM: CPT | Mod: 26,,, | Performed by: PATHOLOGY

## 2019-03-07 PROCEDURE — 99999 PR PBB SHADOW E&M-EST. PATIENT-LVL III: CPT | Mod: PBBFAC,,, | Performed by: INTERNAL MEDICINE

## 2019-03-07 PROCEDURE — 99203 PR OFFICE/OUTPT VISIT, NEW, LEVL III, 30-44 MIN: ICD-10-PCS | Mod: S$GLB,,, | Performed by: INTERNAL MEDICINE

## 2019-03-07 PROCEDURE — 80061 LIPID PANEL: CPT

## 2019-03-07 PROCEDURE — 36415 COLL VENOUS BLD VENIPUNCTURE: CPT

## 2019-03-07 NOTE — PROGRESS NOTES
Subjective:      Patient ID: Kj Chávez is a 73 y.o. female.    Chief Complaint:No chief complaint on file.      History of Present Illness    Frequent UTIs per note. Worse when she she drinks arnold palmers, has switched to just lemonade.    Denies burning, fever, hesitancy, only hospitalization for UTI was in her 30's.     All cultures reviewed, multiple organisms, very susceptible. Many with with several organisms, none in predominance.    Review of Systems   HENT: Positive for sore throat.    Respiratory: Positive for cough.    Musculoskeletal: Positive for joint pain.   Gastrointestinal: Positive for abdominal pain, constipation and nausea.   Genitourinary: Positive for frequency.   Neurological: Positive for headaches.     Objective:   Physical Exam   Constitutional: She appears well-developed.   Neck: Normal range of motion.   Cardiovascular: Normal rate and regular rhythm.   Pulmonary/Chest: Effort normal and breath sounds normal.   Abdominal: Soft. Bowel sounds are normal.   Genitourinary:   Genitourinary Comments: No pain to palpation   Nursing note and vitals reviewed.    Assessment:       1. Asymptomatic bacteriuria          Plan:           Long discussion with patient regarding asymptomatic bacteriuria.  No need to treat unless become ill with UTI symptoms other than frequency related to what sounds like caffeine.  She agrees with the plan.

## 2019-03-07 NOTE — LETTER
March 7, 2019      Susi Eli MD  [address]  [facility] [address]           [facility]  [address]  [facility] [address]  Phone: [phone]  Fax: [phone]          Patient: Kj Chávez   MR Number: [id number]   YOB: 1945   Date of Visit: 3/7/2019       Dear Dr. Susi Eli:    Thank you for referring Kj Chávez to me for evaluation. Attached you will find relevant portions of my assessment and plan of care.    If you have questions, please do not hesitate to call me. I look forward to following Kj Chávez along with you.    Sincerely,    Katerina Manning MD    Enclosure  CC:  No Recipients    If you would like to receive this communication electronically, please contact [email or web address] or [phone] to request more information on [employer] Link access.    For providers and/or their staff who would like to refer a patient to [facility], please contact us through our one-stop-shop provider referral line, [facility], at [phone].    If you feel you have received this communication in error or would no longer like to receive these types of communications, please e-mail [email or web address]

## 2019-03-08 LAB
ALBUMIN SERPL ELPH-MCNC: 4.27 G/DL
ALPHA1 GLOB SERPL ELPH-MCNC: 0.27 G/DL
ALPHA2 GLOB SERPL ELPH-MCNC: 0.82 G/DL
B-GLOBULIN SERPL ELPH-MCNC: 0.71 G/DL
GAMMA GLOB SERPL ELPH-MCNC: 1.14 G/DL
PATHOLOGIST INTERPRETATION SPE: NORMAL
PROT SERPL-MCNC: 7.2 G/DL

## 2019-03-11 ENCOUNTER — TELEPHONE (OUTPATIENT)
Dept: GASTROENTEROLOGY | Facility: CLINIC | Age: 74
End: 2019-03-11

## 2019-03-11 NOTE — TELEPHONE ENCOUNTER
Attempted to call pt regarding cancer screening results pt didn't answer couldn't leave vm pt vm full. Will mail letter.         ----- Message from Abdiaziz Lynn PA-C sent at 3/10/2019  7:58 PM CDT -----  Your colon cancer screening stool test was normal

## 2019-03-12 ENCOUNTER — TELEPHONE (OUTPATIENT)
Dept: PRIMARY CARE CLINIC | Facility: CLINIC | Age: 74
End: 2019-03-12

## 2019-03-18 ENCOUNTER — TELEPHONE (OUTPATIENT)
Dept: GASTROENTEROLOGY | Facility: CLINIC | Age: 74
End: 2019-03-18

## 2019-03-18 NOTE — TELEPHONE ENCOUNTER
Pt aware and understanding that her cancer screening stool test is normal.         ----- Message from Marylou Carbajal sent at 3/18/2019  1:07 PM CDT -----  Contact: self   Shane    Needs Advice    Reason for call: returning your call         Communication Preference: [phone]    Additional Information:

## 2019-03-27 ENCOUNTER — TELEPHONE (OUTPATIENT)
Dept: PRIMARY CARE CLINIC | Facility: CLINIC | Age: 74
End: 2019-03-27

## 2019-03-27 DIAGNOSIS — E78.00 PURE HYPERCHOLESTEROLEMIA: Primary | ICD-10-CM

## 2019-03-27 NOTE — TELEPHONE ENCOUNTER
Please call patient.  Her protein lab was normal. a1c is in prediabetes range, unchanged from previously.  Cholesterol levels are high.  The 10 year risk of heart attack or stroke is 19%.  Would she be willing to start a cholesterol medication such as atorvastatin?  Rest of labs are in normal range.     The 10-year ASCVD risk score (Albert RAY Jr., et al., 2013) is: 19.5%    Values used to calculate the score:      Age: 73 years      Sex: Female      Is Non- : Yes      Diabetic: No      Tobacco smoker: No      Systolic Blood Pressure: 147 mmHg      Is BP treated: No      HDL Cholesterol: 63 mg/dL      Total Cholesterol: 229 mg/dL

## 2019-03-29 RX ORDER — ATORVASTATIN CALCIUM 40 MG/1
40 TABLET, FILM COATED ORAL DAILY
Qty: 90 TABLET | Refills: 3 | Status: SHIPPED | OUTPATIENT
Start: 2019-03-29 | End: 2019-07-12

## 2019-03-29 NOTE — TELEPHONE ENCOUNTER
Atorvastatin sent to pharmacy. Take at night.   Repeat labs in 3 months.   Work on low fat diet as well - minimize fried foods and red meat.

## 2019-04-01 NOTE — TELEPHONE ENCOUNTER
Notified pt of the need to start a low fat diet and minimize fried foods and red meat. She stated she hasn't eaten red meat in over 20 yrs and fried foods only once a year. Encouraged to leave chocolate alone. Scheduled labs for July.

## 2019-07-01 ENCOUNTER — LAB VISIT (OUTPATIENT)
Dept: LAB | Facility: HOSPITAL | Age: 74
End: 2019-07-01
Attending: INTERNAL MEDICINE
Payer: COMMERCIAL

## 2019-07-01 DIAGNOSIS — E78.00 PURE HYPERCHOLESTEROLEMIA: ICD-10-CM

## 2019-07-01 LAB
ALBUMIN SERPL BCP-MCNC: 3.9 G/DL (ref 3.5–5.2)
ALP SERPL-CCNC: 213 U/L (ref 55–135)
ALT SERPL W/O P-5'-P-CCNC: 65 U/L (ref 10–44)
ANION GAP SERPL CALC-SCNC: 7 MMOL/L (ref 8–16)
AST SERPL-CCNC: 44 U/L (ref 10–40)
BILIRUB SERPL-MCNC: 0.6 MG/DL (ref 0.1–1)
BUN SERPL-MCNC: 9 MG/DL (ref 8–23)
CALCIUM SERPL-MCNC: 10.2 MG/DL (ref 8.7–10.5)
CHLORIDE SERPL-SCNC: 105 MMOL/L (ref 95–110)
CHOLEST SERPL-MCNC: 150 MG/DL (ref 120–199)
CHOLEST/HDLC SERPL: 2.7 {RATIO} (ref 2–5)
CO2 SERPL-SCNC: 28 MMOL/L (ref 23–29)
CREAT SERPL-MCNC: 0.8 MG/DL (ref 0.5–1.4)
EST. GFR  (AFRICAN AMERICAN): >60 ML/MIN/1.73 M^2
EST. GFR  (NON AFRICAN AMERICAN): >60 ML/MIN/1.73 M^2
GLUCOSE SERPL-MCNC: 103 MG/DL (ref 70–110)
HDLC SERPL-MCNC: 56 MG/DL (ref 40–75)
HDLC SERPL: 37.3 % (ref 20–50)
LDLC SERPL CALC-MCNC: 81.4 MG/DL (ref 63–159)
NONHDLC SERPL-MCNC: 94 MG/DL
POTASSIUM SERPL-SCNC: 4.2 MMOL/L (ref 3.5–5.1)
PROT SERPL-MCNC: 7.1 G/DL (ref 6–8.4)
SODIUM SERPL-SCNC: 140 MMOL/L (ref 136–145)
TRIGL SERPL-MCNC: 63 MG/DL (ref 30–150)

## 2019-07-01 PROCEDURE — 36415 COLL VENOUS BLD VENIPUNCTURE: CPT

## 2019-07-01 PROCEDURE — 80061 LIPID PANEL: CPT

## 2019-07-01 PROCEDURE — 80053 COMPREHEN METABOLIC PANEL: CPT

## 2019-07-12 ENCOUNTER — TELEPHONE (OUTPATIENT)
Dept: INTERNAL MEDICINE | Facility: CLINIC | Age: 74
End: 2019-07-12

## 2019-07-12 DIAGNOSIS — R74.8 ELEVATED LIVER ENZYMES: Primary | ICD-10-CM

## 2019-07-12 NOTE — TELEPHONE ENCOUNTER
Please call pt. Her cholesterol improved but she had a slight increase in her liver enzymes on atorvastatin. Please stop atorvastatin and recheck labs in 2 weeks. Thanks.

## 2019-07-12 NOTE — TELEPHONE ENCOUNTER
Spoke with pt, notified of results and to stop atorvastatin. Pt repeated back and verbalized understanding       Labs scheduled

## 2019-07-26 ENCOUNTER — LAB VISIT (OUTPATIENT)
Dept: LAB | Facility: HOSPITAL | Age: 74
End: 2019-07-26
Attending: INTERNAL MEDICINE
Payer: COMMERCIAL

## 2019-07-26 ENCOUNTER — TELEPHONE (OUTPATIENT)
Dept: INTERNAL MEDICINE | Facility: CLINIC | Age: 74
End: 2019-07-26

## 2019-07-26 DIAGNOSIS — R74.8 ELEVATED LIVER ENZYMES: ICD-10-CM

## 2019-07-26 LAB
ALBUMIN SERPL BCP-MCNC: 4.2 G/DL (ref 3.5–5.2)
ALP SERPL-CCNC: 147 U/L (ref 55–135)
ALT SERPL W/O P-5'-P-CCNC: 34 U/L (ref 10–44)
AST SERPL-CCNC: 24 U/L (ref 10–40)
BILIRUB DIRECT SERPL-MCNC: 0.3 MG/DL (ref 0.1–0.3)
BILIRUB SERPL-MCNC: 0.6 MG/DL (ref 0.1–1)
PROT SERPL-MCNC: 7.4 G/DL (ref 6–8.4)

## 2019-07-26 PROCEDURE — 36415 COLL VENOUS BLD VENIPUNCTURE: CPT

## 2019-07-26 PROCEDURE — 80076 HEPATIC FUNCTION PANEL: CPT

## 2019-12-10 ENCOUNTER — PATIENT OUTREACH (OUTPATIENT)
Dept: ADMINISTRATIVE | Facility: HOSPITAL | Age: 74
End: 2019-12-10

## 2019-12-10 DIAGNOSIS — M81.0 OSTEOPOROSIS, UNSPECIFIED OSTEOPOROSIS TYPE, UNSPECIFIED PATHOLOGICAL FRACTURE PRESENCE: Primary | ICD-10-CM

## 2020-01-09 ENCOUNTER — NURSE TRIAGE (OUTPATIENT)
Dept: ADMINISTRATIVE | Facility: CLINIC | Age: 75
End: 2020-01-09

## 2020-01-09 NOTE — TELEPHONE ENCOUNTER
Reason for Disposition   Message left on identified voicemail   Second attempt to contact family AND no contact made. Phone number verified.    Additional Information   Negative: Caller has already spoken with the PCP (or office), and has no further questions   Negative: Caller has already spoken with another triager and has no further questions   Negative: Caller has already spoken with another triager or PCP (or office), and has further questions and triager able to answer questions.   Negative: Busy signal.  First attempt to contact caller.  Follow-up call scheduled within 15 minutes.   Negative: No answer.  First attempt to contact caller.  Follow-up call scheduled within 15 minutes.   Negative: Message left on unidentified voice mail. Phone number verified.   Negative: Message left with person in household   Negative: Wrong number reached. Phone number verified.    Protocols used: NO CONTACT OR DUPLICATE CONTACT CALL-A-OH

## 2020-02-07 ENCOUNTER — HOSPITAL ENCOUNTER (OUTPATIENT)
Dept: RADIOLOGY | Facility: OTHER | Age: 75
Discharge: HOME | End: 2020-02-07
Attending: INTERNAL MEDICINE
Payer: COMMERCIAL

## 2020-02-07 DIAGNOSIS — Z12.31 SCREENING MAMMOGRAM, ENCOUNTER FOR: ICD-10-CM

## 2020-02-07 PROCEDURE — 77063 MAMMO DIGITAL SCREENING BILAT WITH TOMOSYNTHESIS_CAD: ICD-10-PCS | Mod: 26,,, | Performed by: RADIOLOGY

## 2020-02-07 PROCEDURE — 77067 SCR MAMMO BI INCL CAD: CPT | Mod: TC

## 2020-02-07 PROCEDURE — 77067 SCR MAMMO BI INCL CAD: CPT | Mod: 26,,, | Performed by: RADIOLOGY

## 2020-02-07 PROCEDURE — 77063 BREAST TOMOSYNTHESIS BI: CPT | Mod: 26,,, | Performed by: RADIOLOGY

## 2020-02-07 PROCEDURE — 77067 MAMMO DIGITAL SCREENING BILAT WITH TOMOSYNTHESIS_CAD: ICD-10-PCS | Mod: 26,,, | Performed by: RADIOLOGY

## 2020-03-30 ENCOUNTER — TELEPHONE (OUTPATIENT)
Dept: INTERNAL MEDICINE | Facility: CLINIC | Age: 75
End: 2020-03-30

## 2020-03-30 NOTE — TELEPHONE ENCOUNTER
Spoke to patient and she is going to sign up for [facility]---once she is signed up she will call us back to reschedule the appt with Dr. Hilton for a virtual visit

## 2020-03-30 NOTE — TELEPHONE ENCOUNTER
----- Message from Ayla Li sent at 3/30/2020  2:18 PM CDT -----  Contact: self/ [phone]  Patient is returning a phone call.  Who left a message for the patient: JONNATHAN TAYLOR  Does patient know what this is regarding:  appt.  Comments:

## 2020-03-31 ENCOUNTER — TELEPHONE (OUTPATIENT)
Dept: INTERNAL MEDICINE | Facility: CLINIC | Age: 75
End: 2020-03-31

## 2020-03-31 NOTE — TELEPHONE ENCOUNTER
----- Message from Rhiannon Costello sent at 3/31/2020 10:09 AM CDT -----  Contact: patient [phone]  Patient is returning a phone call.  Who left a message for the patient: not sure  Does patient know what this is regarding:  An appt that was   Comments:

## 2020-04-02 ENCOUNTER — TELEPHONE (OUTPATIENT)
Dept: INTERNAL MEDICINE | Facility: CLINIC | Age: 75
End: 2020-04-02

## 2020-04-02 ENCOUNTER — OFFICE VISIT (OUTPATIENT)
Dept: INTERNAL MEDICINE | Facility: CLINIC | Age: 75
End: 2020-04-02
Payer: COMMERCIAL

## 2020-04-02 DIAGNOSIS — Z13.6 SCREENING FOR CARDIOVASCULAR CONDITION: ICD-10-CM

## 2020-04-02 DIAGNOSIS — M81.0 AGE-RELATED OSTEOPOROSIS WITHOUT CURRENT PATHOLOGICAL FRACTURE: Primary | ICD-10-CM

## 2020-04-02 PROCEDURE — 99213 PR OFFICE/OUTPT VISIT, EST, LEVL III, 20-29 MIN: ICD-10-PCS | Mod: 95,,, | Performed by: INTERNAL MEDICINE

## 2020-04-02 PROCEDURE — 99213 OFFICE O/P EST LOW 20 MIN: CPT | Mod: 95,,, | Performed by: INTERNAL MEDICINE

## 2020-04-02 NOTE — TELEPHONE ENCOUNTER
Yes, patient is already scheduled for bone density scan imaging on Fri 06/05. Patient was scheduled for labs-fasting on Fri 06/05 1:20 pm here in primary care and follow up on Mon 05/08 at 130 pm. Appointment letters will be mailed out to pt as reminders.

## 2020-04-02 NOTE — TELEPHONE ENCOUNTER
Please help pt schedule labs in June and follow up visit with me after labs and dexa done. (dexa already scheduled) Thanks.

## 2020-04-02 NOTE — PROGRESS NOTES
Subjective:       Patient ID: Kj Chávez is a 74 y.o. female.    Chief Complaint: follow up osteoporosis, allergies, hcm    HPI   Kj Chávez is a 74 y.o. female presenting via video visit for evaluation/follow up of the following issues. This visit occurs during the COVID 19 outbreak.     The patient location is: patient's home  The chief complaint leading to consultation is: follow up osteoporosis, allergies, hcm  Visit type: Virtual visit with synchronous audio and video  Total time spent with patient: 15 minutes  Each patient to whom he or she provides medical services by telemedicine is:  (1) informed of the relationship between the physician and patient and the respective role of any other health care provider with respect to management of the patient; and (2) notified that he or she may decline to receive medical services by telemedicine and may withdraw from such care at any time.    Aortic atherosclerosis    Osteoporosis  Calcium and vit d  Due for dexa    Allergies  Allegra or claritin OTC working well    No longer taking oxybutynin.        Review of Systems   Constitutional: Negative for fever.   Respiratory: Negative for shortness of breath.    Cardiovascular: Negative for chest pain.   Musculoskeletal: Negative.    Skin: Negative.        Objective:   LMP 05/30/2000 (Approximate)      Physical Exam   Constitutional: She is oriented to person, place, and time. She appears well-developed and well-nourished. No distress.   HENT:   Head: Atraumatic.   Pulmonary/Chest: Effort normal. No respiratory distress.   Neurological: She is alert and oriented to person, place, and time.   Skin: She is not diaphoretic.   Psychiatric: She has a normal mood and affect. Her behavior is normal.       Assessment:       1. Age-related osteoporosis without current pathological fracture    2. Screening for cardiovascular condition        Plan:       Diagnoses and all orders for this visit:    Age-related  osteoporosis without current pathological fracture  dexa scheduled     Screening for cardiovascular condition  -     Comprehensive metabolic panel; Future  -     CBC auto differential; Future  -     Lipid panel; Future          Fasting labs and dexa in 2-3 months. dexa already scheduled in June.    tdap and shingrx after covid 19 outbreak.

## 2020-05-25 ENCOUNTER — PATIENT OUTREACH (OUTPATIENT)
Dept: ADMINISTRATIVE | Facility: HOSPITAL | Age: 75
End: 2020-05-25

## 2020-09-28 ENCOUNTER — OFFICE VISIT (OUTPATIENT)
Dept: INTERNAL MEDICINE | Facility: CLINIC | Age: 75
End: 2020-09-28
Payer: COMMERCIAL

## 2020-09-28 VITALS
SYSTOLIC BLOOD PRESSURE: 134 MMHG | WEIGHT: 160.69 LBS | OXYGEN SATURATION: 96 % | HEART RATE: 66 BPM | DIASTOLIC BLOOD PRESSURE: 80 MMHG | BODY MASS INDEX: 25.94 KG/M2

## 2020-09-28 DIAGNOSIS — N89.8 VAGINAL DISCHARGE: ICD-10-CM

## 2020-09-28 DIAGNOSIS — N89.8 VAGINAL ITCHING: Primary | ICD-10-CM

## 2020-09-28 DIAGNOSIS — R35.0 URINARY FREQUENCY: ICD-10-CM

## 2020-09-28 LAB
BACTERIA #/AREA URNS AUTO: ABNORMAL /HPF
BILIRUB SERPL-MCNC: NEGATIVE MG/DL
BILIRUB UR QL STRIP: NEGATIVE
BLOOD, POC UA: NEGATIVE
CLARITY UR REFRACT.AUTO: ABNORMAL
COLOR UR AUTO: YELLOW
GLUCOSE UR QL STRIP: NEGATIVE
GLUCOSE UR QL STRIP: NORMAL
HGB UR QL STRIP: NEGATIVE
KETONES UR QL STRIP: NEGATIVE
KETONES UR QL STRIP: NEGATIVE
LEUKOCYTE ESTERASE UR QL STRIP: ABNORMAL
LEUKOCYTE ESTERASE URINE, POC: ABNORMAL
MICROSCOPIC COMMENT: ABNORMAL
NITRITE UR QL STRIP: NEGATIVE
NITRITE, POC UA: ABNORMAL
NON-SQ EPI CELLS #/AREA URNS AUTO: 0 /HPF
PH UR STRIP: 6 [PH] (ref 5–8)
PH, POC UA: 6
PROT UR QL STRIP: NEGATIVE
PROTEIN, POC: ABNORMAL
RBC #/AREA URNS AUTO: 8 /HPF (ref 0–4)
SP GR UR STRIP: 1.02 (ref 1–1.03)
SPECIFIC GRAVITY, POC UA: 1.02
SQUAMOUS #/AREA URNS AUTO: 20 /HPF
URN SPEC COLLECT METH UR: ABNORMAL
UROBILINOGEN, POC UA: NORMAL
WBC #/AREA URNS AUTO: >100 /HPF (ref 0–5)

## 2020-09-28 PROCEDURE — 99214 PR OFFICE/OUTPT VISIT, EST, LEVL IV, 30-39 MIN: ICD-10-PCS | Mod: 25,S$GLB,, | Performed by: PHYSICIAN ASSISTANT

## 2020-09-28 PROCEDURE — 87086 URINE CULTURE/COLONY COUNT: CPT

## 2020-09-28 PROCEDURE — 87481 CANDIDA DNA AMP PROBE: CPT | Mod: 59

## 2020-09-28 PROCEDURE — 81003 POCT URINALYSIS: ICD-10-PCS | Mod: QW,S$GLB,, | Performed by: PHYSICIAN ASSISTANT

## 2020-09-28 PROCEDURE — 81001 URINALYSIS AUTO W/SCOPE: CPT

## 2020-09-28 PROCEDURE — 87661 TRICHOMONAS VAGINALIS AMPLIF: CPT

## 2020-09-28 PROCEDURE — 99999 PR PBB SHADOW E&M-EST. PATIENT-LVL III: CPT | Mod: PBBFAC,,, | Performed by: PHYSICIAN ASSISTANT

## 2020-09-28 PROCEDURE — 81003 URINALYSIS AUTO W/O SCOPE: CPT | Mod: QW,S$GLB,, | Performed by: PHYSICIAN ASSISTANT

## 2020-09-28 PROCEDURE — 87801 DETECT AGNT MULT DNA AMPLI: CPT

## 2020-09-28 PROCEDURE — 99999 PR PBB SHADOW E&M-EST. PATIENT-LVL III: ICD-10-PCS | Mod: PBBFAC,,, | Performed by: PHYSICIAN ASSISTANT

## 2020-09-28 PROCEDURE — 99214 OFFICE O/P EST MOD 30 MIN: CPT | Mod: 25,S$GLB,, | Performed by: PHYSICIAN ASSISTANT

## 2020-09-29 ENCOUNTER — TELEPHONE (OUTPATIENT)
Dept: INTERNAL MEDICINE | Facility: CLINIC | Age: 75
End: 2020-09-29

## 2020-09-29 LAB
BACTERIAL VAGINOSIS DNA: NEGATIVE
CANDIDA GLABRATA DNA: NEGATIVE
CANDIDA KRUSEI DNA: NEGATIVE
CANDIDA RRNA VAG QL PROBE: NEGATIVE
T VAGINALIS RRNA GENITAL QL PROBE: NEGATIVE

## 2020-09-29 RX ORDER — NITROFURANTOIN 25; 75 MG/1; MG/1
100 CAPSULE ORAL 2 TIMES DAILY
Qty: 10 CAPSULE | Refills: 0 | Status: SHIPPED | OUTPATIENT
Start: 2020-09-29 | End: 2020-10-04

## 2020-09-29 NOTE — TELEPHONE ENCOUNTER
Called to discuss UA results.   No answer. Left VM to return call.   UA with +3 leuk, many bacteria  Will start Macrobid and follow up urine culture.       Addendum:    Urine culture negative.   Vaginosis screen also negative   Attempt to reach patient again, no answer  Will send letter and advise for GYN f/u    Susi Inman PA-C

## 2020-09-30 LAB — BACTERIA UR CULT: NORMAL

## 2020-11-04 ENCOUNTER — PATIENT OUTREACH (OUTPATIENT)
Dept: ADMINISTRATIVE | Facility: OTHER | Age: 75
End: 2020-11-04

## 2020-11-04 ENCOUNTER — OFFICE VISIT (OUTPATIENT)
Dept: OPTOMETRY | Facility: CLINIC | Age: 75
End: 2020-11-04
Payer: COMMERCIAL

## 2020-11-04 DIAGNOSIS — H00.014 HORDEOLUM EXTERNUM OF LEFT UPPER EYELID: Primary | ICD-10-CM

## 2020-11-04 PROCEDURE — 99999 PR PBB SHADOW E&M-EST. PATIENT-LVL II: ICD-10-PCS | Mod: PBBFAC,,, | Performed by: OPTOMETRIST

## 2020-11-04 PROCEDURE — 99999 PR PBB SHADOW E&M-EST. PATIENT-LVL II: CPT | Mod: PBBFAC,,, | Performed by: OPTOMETRIST

## 2020-11-04 PROCEDURE — 92002 INTRM OPH EXAM NEW PATIENT: CPT | Mod: S$GLB,,, | Performed by: OPTOMETRIST

## 2020-11-04 PROCEDURE — 92002 PR EYE EXAM, NEW PATIENT,INTERMED: ICD-10-PCS | Mod: S$GLB,,, | Performed by: OPTOMETRIST

## 2020-11-04 RX ORDER — ERYTHROMYCIN 5 MG/G
OINTMENT OPHTHALMIC NIGHTLY
Qty: 1 TUBE | Refills: 0 | Status: SHIPPED | OUTPATIENT
Start: 2020-11-04 | End: 2020-11-11

## 2020-11-04 RX ORDER — NEOMYCIN SULFATE, POLYMYXIN B SULFATE AND DEXAMETHASONE 3.5; 10000; 1 MG/ML; [USP'U]/ML; MG/ML
1 SUSPENSION/ DROPS OPHTHALMIC 4 TIMES DAILY
Qty: 5 ML | Refills: 0 | Status: SHIPPED | OUTPATIENT
Start: 2020-11-04 | End: 2020-11-11

## 2020-11-04 NOTE — LETTER
November 4, 2020      [facility]  [address]  [facility] [address]  Phone: [phone]       Patient: Kj Chávez   YOB: 1945  Date of Visit: 11/04/2020    To Whom It May Concern:    Enrique Chávez  was at [facility] on 11/04/2020. She may return to work/school on 11/05/20 with no restrictions. Ms. Chávez needs to be seen back for a follow up appointment on 11/11/20 at 2:30. If you have any questions or concerns, or if I can be of further assistance, please do not hesitate to contact me.    Sincerely,    Dr. Brody

## 2020-11-04 NOTE — PROGRESS NOTES
SILVERIO RODRIGUES 09/2017 with Dr. Richardson.      Patient noticed MARIANNA was stinging when she washed her face last night.    This morning patient woke up and MARIANNA was swollen, red, with small amount   of discharge. (+)sore to the touch at middle of MARIANNA.    No watering, matting, or itching.  Not using any drops. (-)light   sensitivity (-)CL wear (-)change in vision (-)fever (-)pain on eye   movements      Last edited by Rupa Brody, OD on 11/4/2020  5:15 PM. (History)            Assessment /Plan     For exam results, see Encounter Report.    Hordeolum externum of left upper eyelid    Other orders  -     neomycin-polymyxin-dexamethasone (MAXITROL) 3.5mg/mL-10,000 unit/mL-0.1 % DrpS; Place 1 drop into the left eye 4 (four) times daily. for 7 days  Dispense: 5 mL; Refill: 0  -     erythromycin (ROMYCIN) ophthalmic ointment; Place into the left eye every evening. for 7 days  Dispense: 1 Tube; Refill: 0      Educated pt on findings. Rx maxitrol 1 gtt OS QID for 7 days then d/c. Also Rx erythromycin radha QHS on lesion for 7 days then d/c. Recommend warm compresses with light massage over lesion for 10-15min TID. ATs prn.   Discussed importance of compresses, will aid in resolving lesion. Recommend warm compresses OU Qday to prevent styes in the future.    If symptoms worsen or dont improve, RTC. Monitor 1 week or sooner if needed.   Discussed if swelling/redness increases, RTC ASAP. Discussed if fever or pain on eye movements develop, report to ED.       RTC in 1 week for f/u or sooner if needed.

## 2020-12-13 ENCOUNTER — CLINICAL SUPPORT (OUTPATIENT)
Dept: URGENT CARE | Facility: CLINIC | Age: 75
End: 2020-12-13
Payer: COMMERCIAL

## 2020-12-13 DIAGNOSIS — Z13.9 ENCOUNTER FOR SCREENING: Primary | ICD-10-CM

## 2020-12-13 LAB
CTP QC/QA: YES
SARS-COV-2 RDRP RESP QL NAA+PROBE: NEGATIVE

## 2020-12-13 PROCEDURE — U0002 COVID-19 LAB TEST NON-CDC: HCPCS | Mod: QW,S$GLB,, | Performed by: FAMILY MEDICINE

## 2020-12-13 PROCEDURE — U0002: ICD-10-PCS | Mod: QW,S$GLB,, | Performed by: FAMILY MEDICINE

## 2020-12-30 ENCOUNTER — OFFICE VISIT (OUTPATIENT)
Dept: URGENT CARE | Facility: CLINIC | Age: 75
End: 2020-12-30
Payer: COMMERCIAL

## 2020-12-30 VITALS
HEIGHT: 66 IN | OXYGEN SATURATION: 98 % | WEIGHT: 160 LBS | HEART RATE: 77 BPM | SYSTOLIC BLOOD PRESSURE: 134 MMHG | TEMPERATURE: 98 F | BODY MASS INDEX: 25.71 KG/M2 | DIASTOLIC BLOOD PRESSURE: 90 MMHG | RESPIRATION RATE: 18 BRPM

## 2020-12-30 DIAGNOSIS — M54.50 ACUTE LEFT-SIDED LOW BACK PAIN WITHOUT SCIATICA: Primary | ICD-10-CM

## 2020-12-30 LAB
BILIRUB UR QL STRIP: NEGATIVE
GLUCOSE UR QL STRIP: NEGATIVE
KETONES UR QL STRIP: NEGATIVE
LEUKOCYTE ESTERASE UR QL STRIP: POSITIVE
PH, POC UA: 6 (ref 5–8)
POC BLOOD, URINE: NEGATIVE
POC NITRATES, URINE: NEGATIVE
PROT UR QL STRIP: NEGATIVE
SP GR UR STRIP: 1.02 (ref 1–1.03)
UROBILINOGEN UR STRIP-ACNC: NORMAL (ref 0.1–1.1)

## 2020-12-30 PROCEDURE — 81003 URINALYSIS AUTO W/O SCOPE: CPT | Mod: QW,S$GLB,, | Performed by: NURSE PRACTITIONER

## 2020-12-30 PROCEDURE — 96372 PR INJECTION,THERAP/PROPH/DIAG2ST, IM OR SUBCUT: ICD-10-PCS | Mod: S$GLB,,, | Performed by: NURSE PRACTITIONER

## 2020-12-30 PROCEDURE — 99214 OFFICE O/P EST MOD 30 MIN: CPT | Mod: 25,S$GLB,, | Performed by: NURSE PRACTITIONER

## 2020-12-30 PROCEDURE — 99214 PR OFFICE/OUTPT VISIT, EST, LEVL IV, 30-39 MIN: ICD-10-PCS | Mod: 25,S$GLB,, | Performed by: NURSE PRACTITIONER

## 2020-12-30 PROCEDURE — 96372 THER/PROPH/DIAG INJ SC/IM: CPT | Mod: S$GLB,,, | Performed by: NURSE PRACTITIONER

## 2020-12-30 PROCEDURE — 81003 POCT URINALYSIS, DIPSTICK, AUTOMATED, W/O SCOPE: ICD-10-PCS | Mod: QW,S$GLB,, | Performed by: NURSE PRACTITIONER

## 2020-12-30 RX ORDER — IBUPROFEN 800 MG/1
800 TABLET ORAL EVERY 6 HOURS PRN
Qty: 20 TABLET | Refills: 0 | Status: SHIPPED | OUTPATIENT
Start: 2020-12-30 | End: 2021-01-05 | Stop reason: SDUPTHER

## 2020-12-30 RX ORDER — METHOCARBAMOL 500 MG/1
500 TABLET, FILM COATED ORAL 3 TIMES DAILY
Qty: 30 TABLET | Refills: 0 | Status: SHIPPED | OUTPATIENT
Start: 2020-12-30 | End: 2021-01-05

## 2020-12-30 RX ORDER — KETOROLAC TROMETHAMINE 30 MG/ML
30 INJECTION, SOLUTION INTRAMUSCULAR; INTRAVENOUS
Status: COMPLETED | OUTPATIENT
Start: 2020-12-30 | End: 2020-12-30

## 2020-12-30 RX ADMIN — KETOROLAC TROMETHAMINE 30 MG: 30 INJECTION, SOLUTION INTRAMUSCULAR; INTRAVENOUS at 10:12

## 2020-12-30 NOTE — PROGRESS NOTES
"Subjective:       Patient ID: Kj Chávez is a 75 y.o. female.    Vitals:  height is 5' 6" (1.676 m) and weight is 72.6 kg (160 lb). Her temperature is 98.3 °F (36.8 °C). Her blood pressure is 134/90 (abnormal) and her pulse is 77. Her respiration is 18 and oxygen saturation is 98%.     Chief Complaint: Back Pain    Pt presents complaining of left gluteal pain/lower back pain. Pt states the pain has worsened over the last week. Pt has used otc topical medication and advil with mild relief.   Thinks that it started after carrying in groceries up the stairs.  Denies trauma.  Denies rash, swelling, redness.  Denies fever.  Denies  complaints.  Denies numbness, weakness, loss of bowel/bladder control.    Back Pain  This is a new problem. The current episode started 1 to 4 weeks ago (around thanksgiving). The problem occurs constantly. The problem has been gradually worsening since onset. The pain is present in the lumbar spine. The quality of the pain is described as aching. The pain radiates to the left thigh. The pain is at a severity of 7/10. The pain is the same all the time. The symptoms are aggravated by position and stress. Pertinent negatives include no abdominal pain, bladder incontinence, bowel incontinence, chest pain, dysuria, fever, headaches, leg pain, numbness, paresis, paresthesias, pelvic pain, perianal numbness, tingling, weakness or weight loss. She has tried muscle relaxant and NSAIDs for the symptoms. The treatment provided mild relief.       Constitution: Negative for chills, fatigue and fever.   HENT: Negative for congestion and sore throat.    Neck: Negative for painful lymph nodes.   Cardiovascular: Negative for chest pain and leg swelling.   Eyes: Negative for double vision and blurred vision.   Respiratory: Negative for cough and shortness of breath.    Gastrointestinal: Negative for abdominal pain, nausea, vomiting, diarrhea and bowel incontinence.   Genitourinary: Negative for " dysuria, frequency, urgency, bladder incontinence, history of kidney stones and pelvic pain.   Musculoskeletal: Positive for back pain. Negative for joint pain, joint swelling, muscle cramps and muscle ache.   Skin: Negative for color change, pale, rash and bruising.   Allergic/Immunologic: Negative for seasonal allergies.   Neurological: Negative for dizziness, history of vertigo, light-headedness, passing out, headaches and numbness.   Hematologic/Lymphatic: Negative for swollen lymph nodes.   Psychiatric/Behavioral: Negative for nervous/anxious, sleep disturbance and depression. The patient is not nervous/anxious.        Objective:      Physical Exam   Constitutional: She is oriented to person, place, and time. She appears well-developed. She is cooperative. No distress.   HENT:   Head: Normocephalic and atraumatic.   Nose: Nose normal.   Mouth/Throat: Oropharynx is clear and moist and mucous membranes are normal.   Eyes: Conjunctivae and lids are normal.   Neck: Trachea normal, normal range of motion, full passive range of motion without pain and phonation normal. Neck supple.   Cardiovascular: Normal rate, regular rhythm, normal heart sounds and normal pulses.   Pulmonary/Chest: Effort normal and breath sounds normal.   Abdominal: Soft. Normal appearance and bowel sounds are normal. She exhibits no abdominal bruit, no pulsatile midline mass and no mass.   Musculoskeletal:         General: No deformity.      Lumbar back: She exhibits tenderness, pain and spasm. She exhibits normal range of motion, no bony tenderness, no swelling, no edema, no deformity, no laceration and normal pulse.        Back:       Comments: TTP with no redness warmth or swelling.    +2 DTRs.  +2 pulses.    Good strength and sensation distally.  Full ROM.   Neurological: She is alert and oriented to person, place, and time. She has normal motor skills, normal sensation, normal strength, normal reflexes and intact cranial nerves. No sensory  deficit. Gait and coordination normal.   Skin: Skin is warm, dry, intact and not diaphoretic. Psychiatric: Her speech is normal and behavior is normal. Judgment and thought content normal.   Nursing note and vitals reviewed.    Results for orders placed or performed in visit on 12/30/20   POCT Urinalysis, Dipstick, Automated, W/O Scope   Result Value Ref Range    POC Blood, Urine Negative Negative    POC Bilirubin, Urine Negative Negative    POC Urobilinogen, Urine Normal 0.1 - 1.1    POC Ketones, Urine Negative Negative    POC Protein, Urine Negative Negative    POC Nitrates, Urine Negative Negative    POC Glucose, Urine Negative Negative    pH, UA 6.0 5 - 8    POC Specific Gravity, Urine 1.020 1.003 - 1.029    POC Leukocytes, Urine Positive (A) Negative         Assessment:       1. Acute left-sided low back pain without sciatica        Plan:       Lab reviewed.  Acute left-sided low back pain without sciatica  -     POCT Urinalysis, Dipstick, Automated, W/O Scope  -     Cancel: Urine culture  -     ketorolac injection 30 mg  -     methocarbamoL (ROBAXIN) 500 MG Tab; Take 1 tablet (500 mg total) by mouth 3 (three) times daily. As needed for muscle spasm. May cause drowsiness for 10 days  Dispense: 30 tablet; Refill: 0  -     ibuprofen (ADVIL,MOTRIN) 800 MG tablet; Take 1 tablet (800 mg total) by mouth every 6 (six) hours as needed for Pain.  Dispense: 20 tablet; Refill: 0      Patient Instructions   Please drink plenty of fluids.  Please get plenty of rest.  Please return here or go to the Emergency Department for any concerns or worsening of condition.  If you were prescribed a muscle relaxant medication, do not drive or operate heavy equipment or machinery while taking these medications.  If you were not prescribed an anti-inflammatory medication, and if you do not have any history of stomach/intestinal ulcers, or kidney disease, or are not taking a blood thinner such as Coumadin, Plavix, Pradaxa, Eloquis, or  Xaralta for example, it is OK to take over the counter Ibuprofen or Advil or Motrin or Aleve as directed.  Do not take these medications on an empty stomach.  If you lose control of your bowel and/or bladder, please go to the nearest Emergency Department immediately.  If you lose sensation in between your legs by your genitalia and/or rectum, please go to the nearest Emergency Department immediately.  If you lose control or sensation of any extremity, please go to the nearest Emergency Department immediately.  Please follow up with your primary care doctor.    If you  smoke, please stop smoking.    Back Pain (Acute or Chronic)    Back pain is one of the most common problems. The good news is that most people feel better in 1 to 2 weeks, and most of the rest in 1 to 2 months. Most people can remain active.  People experience and describe pain differently; not everyone is the same.  · The pain can be sharp, stabbing, shooting, aching, cramping or burning.  · Movement, standing, bending, lifting, sitting, or walking may worsen pain.  · It can be localized to one spot or area, or it can be more generalized.  · It can spread or radiate upwards, to the front, or go down your arms or legs (sciatica).  · It can cause muscle spasm.  Most of the time, mechanical problems with the muscles or spine cause the pain. Mechanical problems are usually caused by an injury to the muscles or ligaments. While illness can cause back pain, it is usually not caused by a serious illness. Mechanical problems include:   · Physical activity such as sports, exercise, work, or normal activity  · Overexertion, lifting, pushing, pulling incorrectly or too aggressively  · Sudden twisting, bending, or stretching from an accident, or accidental movement  · Poor posture  · Stretching or moving wrong, without noticing pain at the time  · Poor coordination, lack of regular exercise (check with your doctor about this)  · Spinal disc disease or  arthritis  · Stress  Pain can also be related to pregnancy, or illness like appendicitis, bladder or kidney infections, pelvic infections, and many other things.  Acute back pain usually gets better in 1 to 2 weeks. Back pain related to disk disease, arthritis in the spinal joints or spinal stenosis (narrowing of the spinal canal) can become chronic and last for months or years.  Unless you had a physical injury (for example, a car accident or fall) X-rays are usually not needed for the initial evaluation of back pain. If pain continues and does not respond to medical treatment, X-rays and other tests may be needed.  Home care  Try these home care recommendations:  · When in bed, try to find a position of comfort. A firm mattress is best. Try lying flat on your back with pillows under your knees. You can also try lying on your side with your knees bent up towards your chest and a pillow between your knees.  · At first, do not try to stretch out the sore spots. If there is a strain, it is not like the good soreness you get after exercising without an injury. In this case, stretching may make it worse.  · Avoid prolong sitting, long car rides, or travel. This puts more stress on the lower back than standing or walking.  · During the first 24 to 72 hours after an acute injury or flare up of chronic back pain, apply an ice pack to the painful area for 20 minutes and then remove it for 20 minutes. Do this over a period of 60 to 90 minutes or several times a day. This will reduce swelling and pain. Wrap the ice pack in a thin towel or plastic to protect your skin.  · You can start with ice, then switch to heat. Heat (hot shower, hot bath, or heating pad) reduces pain and works well for muscle spasms. Heat can be applied to the painful area for 20 minutes then remove it for 20 minutes. Do this over a period of 60 to 90 minutes or several times a day. Do not sleep on a heating pad. It can lead to skin burns or tissue  damage.  · You can alternate ice and heat therapy. Talk with your doctor about the best treatment for your back pain.  · Therapeutic massage can help relax the back muscles without stretching them.  · Be aware of safe lifting methods and do not lift anything without stretching first.  Medicines  Talk to your doctor before using medicine, especially if you have other medical problems or are taking other medicines.  · You may use over-the-counter medicine as directed on the bottle to control pain, unless another pain medicine was prescribed. If you have chronic conditions like diabetes, liver or kidney disease, stomach ulcers, or gastrointestinal bleeding, or are taking blood thinners, talk to your doctor before taking any medicine.  · Be careful if you are given a prescription medicines, narcotics, or medicine for muscle spasms. They can cause drowsiness, affect your coordination, reflexes, and judgement. Do not drive or operate heavy machinery.  Follow-up care  Follow up with your healthcare provider, or as advised.   A radiologist will review any X-rays that were taken. Your provide will notify you of any new findings that may affect your care.  Call 911  Call emergency services if any of the following occur:  · Trouble breathing  · Confusion  · Very drowsy or trouble awakening  · Fainting or loss of consciousness  · Rapid or very slow heart rate  · Loss of bowel or bladder control  When to seek medical advice  Call your healthcare provider right away if any of these occur:   · Pain becomes worse or spreads to your legs  · Weakness or numbness in one or both legs  · Numbness in the groin or genital area  Date Last Reviewed: 7/1/2016  © 6909-4757 [employer]. [address] [place] [address], [place] [address]. All rights reserved. This information is not intended as a substitute for professional medical care. Always follow your healthcare professional's instructions.

## 2021-01-04 ENCOUNTER — TELEPHONE (OUTPATIENT)
Dept: INTERNAL MEDICINE | Facility: CLINIC | Age: 76
End: 2021-01-04

## 2021-01-05 ENCOUNTER — OFFICE VISIT (OUTPATIENT)
Dept: INTERNAL MEDICINE | Facility: CLINIC | Age: 76
End: 2021-01-05
Payer: COMMERCIAL

## 2021-01-05 VITALS
HEIGHT: 66 IN | DIASTOLIC BLOOD PRESSURE: 88 MMHG | BODY MASS INDEX: 24.75 KG/M2 | WEIGHT: 154 LBS | SYSTOLIC BLOOD PRESSURE: 124 MMHG

## 2021-01-05 DIAGNOSIS — M54.50 ACUTE LEFT-SIDED LOW BACK PAIN WITHOUT SCIATICA: ICD-10-CM

## 2021-01-05 DIAGNOSIS — R35.0 URINARY FREQUENCY: Primary | ICD-10-CM

## 2021-01-05 DIAGNOSIS — G89.29 CHRONIC LEFT-SIDED LOW BACK PAIN WITHOUT SCIATICA: ICD-10-CM

## 2021-01-05 DIAGNOSIS — M54.50 CHRONIC LEFT-SIDED LOW BACK PAIN WITHOUT SCIATICA: ICD-10-CM

## 2021-01-05 LAB
BILIRUB SERPL-MCNC: NEGATIVE MG/DL
BLOOD URINE, POC: NEGATIVE
COLOR, POC UA: YELLOW
GLUCOSE UR QL STRIP: NORMAL
KETONES UR QL STRIP: NEGATIVE
LEUKOCYTE ESTERASE URINE, POC: ABNORMAL
NITRITE, POC UA: NEGATIVE
PH, POC UA: 5
PROTEIN, POC: ABNORMAL
SPECIFIC GRAVITY, POC UA: 1.02
UROBILINOGEN, POC UA: NORMAL

## 2021-01-05 PROCEDURE — 99214 PR OFFICE/OUTPT VISIT, EST, LEVL IV, 30-39 MIN: ICD-10-PCS | Mod: S$GLB,25,, | Performed by: INTERNAL MEDICINE

## 2021-01-05 PROCEDURE — 99999 PR PBB SHADOW E&M-EST. PATIENT-LVL III: ICD-10-PCS | Mod: PBBFAC,,, | Performed by: INTERNAL MEDICINE

## 2021-01-05 PROCEDURE — 81001 POCT URINALYSIS, DIPSTICK OR TABLET REAGENT, AUTOMATED, WITH MICROSCOP: ICD-10-PCS | Mod: S$GLB,,, | Performed by: INTERNAL MEDICINE

## 2021-01-05 PROCEDURE — 99214 OFFICE O/P EST MOD 30 MIN: CPT | Mod: S$GLB,25,, | Performed by: INTERNAL MEDICINE

## 2021-01-05 PROCEDURE — 81001 URINALYSIS AUTO W/SCOPE: CPT | Mod: S$GLB,,, | Performed by: INTERNAL MEDICINE

## 2021-01-05 PROCEDURE — 99999 PR PBB SHADOW E&M-EST. PATIENT-LVL III: CPT | Mod: PBBFAC,,, | Performed by: INTERNAL MEDICINE

## 2021-01-05 PROCEDURE — 81001 URINALYSIS AUTO W/SCOPE: CPT

## 2021-01-05 RX ORDER — IBUPROFEN 800 MG/1
800 TABLET ORAL EVERY 6 HOURS PRN
Qty: 30 TABLET | Refills: 0 | Status: SHIPPED | OUTPATIENT
Start: 2021-01-05

## 2021-01-05 RX ORDER — CYCLOBENZAPRINE HCL 10 MG
10 TABLET ORAL 3 TIMES DAILY PRN
Qty: 30 TABLET | Refills: 1 | Status: SHIPPED | OUTPATIENT
Start: 2021-01-05 | End: 2021-01-25

## 2021-01-06 LAB
BACTERIA #/AREA URNS AUTO: ABNORMAL /HPF
BILIRUB UR QL STRIP: NEGATIVE
CAOX CRY UR QL COMP ASSIST: ABNORMAL
CLARITY UR REFRACT.AUTO: ABNORMAL
COLOR UR AUTO: YELLOW
GLUCOSE UR QL STRIP: NEGATIVE
HGB UR QL STRIP: NEGATIVE
KETONES UR QL STRIP: NEGATIVE
LEUKOCYTE ESTERASE UR QL STRIP: ABNORMAL
MICROSCOPIC COMMENT: ABNORMAL
NITRITE UR QL STRIP: NEGATIVE
PH UR STRIP: 5 [PH] (ref 5–8)
PROT UR QL STRIP: NEGATIVE
RBC #/AREA URNS AUTO: 2 /HPF (ref 0–4)
SP GR UR STRIP: 1.02 (ref 1–1.03)
SQUAMOUS #/AREA URNS AUTO: 7 /HPF
URN SPEC COLLECT METH UR: ABNORMAL
WBC #/AREA URNS AUTO: 4 /HPF (ref 0–5)

## 2021-01-13 ENCOUNTER — IMMUNIZATION (OUTPATIENT)
Dept: OBSTETRICS AND GYNECOLOGY | Facility: CLINIC | Age: 76
End: 2021-01-13
Payer: COMMERCIAL

## 2021-01-13 DIAGNOSIS — Z23 NEED FOR VACCINATION: ICD-10-CM

## 2021-01-13 PROCEDURE — 91300 COVID-19, MRNA, LNP-S, PF, 30 MCG/0.3 ML DOSE VACCINE: CPT | Mod: PBBFAC | Performed by: FAMILY MEDICINE

## 2021-02-03 ENCOUNTER — IMMUNIZATION (OUTPATIENT)
Dept: OBSTETRICS AND GYNECOLOGY | Facility: CLINIC | Age: 76
End: 2021-02-03
Payer: COMMERCIAL

## 2021-02-03 DIAGNOSIS — Z23 NEED FOR VACCINATION: Primary | ICD-10-CM

## 2021-02-03 PROCEDURE — 0002A COVID-19, MRNA, LNP-S, PF, 30 MCG/0.3 ML DOSE VACCINE: CPT | Mod: PBBFAC | Performed by: FAMILY MEDICINE

## 2021-02-03 PROCEDURE — 91300 COVID-19, MRNA, LNP-S, PF, 30 MCG/0.3 ML DOSE VACCINE: CPT | Mod: PBBFAC | Performed by: FAMILY MEDICINE

## 2021-02-08 ENCOUNTER — CLINICAL SUPPORT (OUTPATIENT)
Dept: REHABILITATION | Facility: OTHER | Age: 76
End: 2021-02-08
Attending: INTERNAL MEDICINE
Payer: COMMERCIAL

## 2021-02-08 DIAGNOSIS — M54.50 CHRONIC LEFT-SIDED LOW BACK PAIN WITHOUT SCIATICA: ICD-10-CM

## 2021-02-08 DIAGNOSIS — R29.898 DECREASED STRENGTH OF TRUNK AND BACK: ICD-10-CM

## 2021-02-08 DIAGNOSIS — G89.29 CHRONIC LEFT-SIDED LOW BACK PAIN WITHOUT SCIATICA: ICD-10-CM

## 2021-02-08 DIAGNOSIS — M53.86 DECREASED RANGE OF MOTION OF LUMBAR SPINE: ICD-10-CM

## 2021-02-08 PROCEDURE — 97161 PT EVAL LOW COMPLEX 20 MIN: CPT

## 2021-02-08 PROCEDURE — 97110 THERAPEUTIC EXERCISES: CPT

## 2021-02-09 PROBLEM — M53.86 DECREASED RANGE OF MOTION OF LUMBAR SPINE: Status: ACTIVE | Noted: 2021-02-09

## 2021-02-09 PROBLEM — R29.898 DECREASED STRENGTH OF TRUNK AND BACK: Status: ACTIVE | Noted: 2021-02-09

## 2021-02-10 DIAGNOSIS — Z12.31 OTHER SCREENING MAMMOGRAM: ICD-10-CM

## 2021-02-17 ENCOUNTER — CLINICAL SUPPORT (OUTPATIENT)
Dept: REHABILITATION | Facility: OTHER | Age: 76
End: 2021-02-17
Attending: INTERNAL MEDICINE
Payer: COMMERCIAL

## 2021-02-17 DIAGNOSIS — R29.898 DECREASED STRENGTH OF TRUNK AND BACK: Primary | ICD-10-CM

## 2021-02-17 DIAGNOSIS — M53.86 DECREASED RANGE OF MOTION OF LUMBAR SPINE: ICD-10-CM

## 2021-02-17 PROCEDURE — 97110 THERAPEUTIC EXERCISES: CPT

## 2021-03-17 ENCOUNTER — CLINICAL SUPPORT (OUTPATIENT)
Dept: REHABILITATION | Facility: OTHER | Age: 76
End: 2021-03-17
Attending: INTERNAL MEDICINE
Payer: COMMERCIAL

## 2021-03-17 DIAGNOSIS — M53.86 DECREASED RANGE OF MOTION OF LUMBAR SPINE: ICD-10-CM

## 2021-03-17 DIAGNOSIS — R29.898 DECREASED STRENGTH OF TRUNK AND BACK: Primary | ICD-10-CM

## 2021-03-17 PROCEDURE — 97110 THERAPEUTIC EXERCISES: CPT

## 2021-03-18 ENCOUNTER — PATIENT MESSAGE (OUTPATIENT)
Dept: RESEARCH | Facility: HOSPITAL | Age: 76
End: 2021-03-18

## 2021-03-24 ENCOUNTER — CLINICAL SUPPORT (OUTPATIENT)
Dept: REHABILITATION | Facility: OTHER | Age: 76
End: 2021-03-24
Attending: INTERNAL MEDICINE
Payer: COMMERCIAL

## 2021-03-24 DIAGNOSIS — M51.36 DDD (DEGENERATIVE DISC DISEASE), LUMBAR: Primary | ICD-10-CM

## 2021-03-24 PROCEDURE — 97110 THERAPEUTIC EXERCISES: CPT | Mod: CQ

## 2021-03-26 ENCOUNTER — PATIENT MESSAGE (OUTPATIENT)
Dept: RESEARCH | Facility: HOSPITAL | Age: 76
End: 2021-03-26

## 2021-06-07 ENCOUNTER — DOCUMENTATION ONLY (OUTPATIENT)
Dept: REHABILITATION | Facility: OTHER | Age: 76
End: 2021-06-07

## 2021-07-15 ENCOUNTER — OFFICE VISIT (OUTPATIENT)
Dept: INTERNAL MEDICINE | Facility: CLINIC | Age: 76
End: 2021-07-15
Payer: COMMERCIAL

## 2021-07-15 ENCOUNTER — PATIENT MESSAGE (OUTPATIENT)
Dept: PHARMACY | Facility: CLINIC | Age: 76
End: 2021-07-15

## 2021-07-15 ENCOUNTER — LAB VISIT (OUTPATIENT)
Dept: LAB | Facility: HOSPITAL | Age: 76
End: 2021-07-15
Attending: STUDENT IN AN ORGANIZED HEALTH CARE EDUCATION/TRAINING PROGRAM
Payer: COMMERCIAL

## 2021-07-15 VITALS
OXYGEN SATURATION: 98 % | BODY MASS INDEX: 24.77 KG/M2 | HEART RATE: 70 BPM | DIASTOLIC BLOOD PRESSURE: 70 MMHG | WEIGHT: 154.13 LBS | HEIGHT: 66 IN | SYSTOLIC BLOOD PRESSURE: 130 MMHG

## 2021-07-15 DIAGNOSIS — Z00.00 ANNUAL PHYSICAL EXAM: ICD-10-CM

## 2021-07-15 DIAGNOSIS — S76.211A INGUINAL STRAIN, RIGHT, INITIAL ENCOUNTER: Primary | ICD-10-CM

## 2021-07-15 DIAGNOSIS — R51.9 SINUS HEADACHE: ICD-10-CM

## 2021-07-15 DIAGNOSIS — Z12.11 SPECIAL SCREENING FOR MALIGNANT NEOPLASMS, COLON: Primary | ICD-10-CM

## 2021-07-15 DIAGNOSIS — Z12.11 COLON CANCER SCREENING: ICD-10-CM

## 2021-07-15 DIAGNOSIS — Z13.220 NEED FOR LIPID SCREENING: ICD-10-CM

## 2021-07-15 DIAGNOSIS — M81.0 OSTEOPOROSIS, UNSPECIFIED OSTEOPOROSIS TYPE, UNSPECIFIED PATHOLOGICAL FRACTURE PRESENCE: ICD-10-CM

## 2021-07-15 DIAGNOSIS — R30.0 DYSURIA: ICD-10-CM

## 2021-07-15 LAB
ALBUMIN SERPL BCP-MCNC: 4.1 G/DL (ref 3.5–5.2)
ALP SERPL-CCNC: 104 U/L (ref 55–135)
ALT SERPL W/O P-5'-P-CCNC: 11 U/L (ref 10–44)
ANION GAP SERPL CALC-SCNC: 8 MMOL/L (ref 8–16)
AST SERPL-CCNC: 13 U/L (ref 10–40)
BASOPHILS # BLD AUTO: 0.04 K/UL (ref 0–0.2)
BASOPHILS NFR BLD: 1 % (ref 0–1.9)
BILIRUB SERPL-MCNC: 0.5 MG/DL (ref 0.1–1)
BILIRUB SERPL-MCNC: NEGATIVE MG/DL
BLOOD URINE, POC: NEGATIVE
BUN SERPL-MCNC: 11 MG/DL (ref 8–23)
CALCIUM SERPL-MCNC: 10.1 MG/DL (ref 8.7–10.5)
CHLORIDE SERPL-SCNC: 105 MMOL/L (ref 95–110)
CHOLEST SERPL-MCNC: 221 MG/DL (ref 120–199)
CHOLEST/HDLC SERPL: 3.7 {RATIO} (ref 2–5)
CLARITY, POC UA: CLEAR
CO2 SERPL-SCNC: 28 MMOL/L (ref 23–29)
COLOR, POC UA: NORMAL
CREAT SERPL-MCNC: 0.7 MG/DL (ref 0.5–1.4)
DIFFERENTIAL METHOD: ABNORMAL
EOSINOPHIL # BLD AUTO: 0.1 K/UL (ref 0–0.5)
EOSINOPHIL NFR BLD: 1.8 % (ref 0–8)
ERYTHROCYTE [DISTWIDTH] IN BLOOD BY AUTOMATED COUNT: 14.9 % (ref 11.5–14.5)
EST. GFR  (AFRICAN AMERICAN): >60 ML/MIN/1.73 M^2
EST. GFR  (NON AFRICAN AMERICAN): >60 ML/MIN/1.73 M^2
GLUCOSE SERPL-MCNC: 93 MG/DL (ref 70–110)
GLUCOSE UR QL STRIP: NORMAL
HCT VFR BLD AUTO: 42.1 % (ref 37–48.5)
HDLC SERPL-MCNC: 59 MG/DL (ref 40–75)
HDLC SERPL: 26.7 % (ref 20–50)
HGB BLD-MCNC: 12.3 G/DL (ref 12–16)
IMM GRANULOCYTES # BLD AUTO: 0.02 K/UL (ref 0–0.04)
IMM GRANULOCYTES NFR BLD AUTO: 0.5 % (ref 0–0.5)
KETONES UR QL STRIP: NEGATIVE
LDLC SERPL CALC-MCNC: 144.6 MG/DL (ref 63–159)
LEUKOCYTE ESTERASE URINE, POC: NORMAL
LYMPHOCYTES # BLD AUTO: 1.6 K/UL (ref 1–4.8)
LYMPHOCYTES NFR BLD: 40.4 % (ref 18–48)
MCH RBC QN AUTO: 22 PG (ref 27–31)
MCHC RBC AUTO-ENTMCNC: 29.2 G/DL (ref 32–36)
MCV RBC AUTO: 75 FL (ref 82–98)
MONOCYTES # BLD AUTO: 0.5 K/UL (ref 0.3–1)
MONOCYTES NFR BLD: 11.9 % (ref 4–15)
NEUTROPHILS # BLD AUTO: 1.8 K/UL (ref 1.8–7.7)
NEUTROPHILS NFR BLD: 44.4 % (ref 38–73)
NITRITE, POC UA: NEGATIVE
NONHDLC SERPL-MCNC: 162 MG/DL
NRBC BLD-RTO: 0 /100 WBC
PH, POC UA: 6
PLATELET # BLD AUTO: 248 K/UL (ref 150–450)
PMV BLD AUTO: 12.9 FL (ref 9.2–12.9)
POTASSIUM SERPL-SCNC: 5 MMOL/L (ref 3.5–5.1)
PROT SERPL-MCNC: 7.3 G/DL (ref 6–8.4)
PROTEIN, POC: NORMAL
PTH-INTACT SERPL-MCNC: 121 PG/ML (ref 9–77)
RBC # BLD AUTO: 5.6 M/UL (ref 4–5.4)
SODIUM SERPL-SCNC: 141 MMOL/L (ref 136–145)
SPECIFIC GRAVITY, POC UA: 1.02
TRIGL SERPL-MCNC: 87 MG/DL (ref 30–150)
UROBILINOGEN, POC UA: NORMAL
WBC # BLD AUTO: 3.94 K/UL (ref 3.9–12.7)

## 2021-07-15 PROCEDURE — 99999 PR PBB SHADOW E&M-EST. PATIENT-LVL V: ICD-10-PCS | Mod: PBBFAC,,, | Performed by: STUDENT IN AN ORGANIZED HEALTH CARE EDUCATION/TRAINING PROGRAM

## 2021-07-15 PROCEDURE — 81002 POCT URINE DIPSTICK WITHOUT MICROSCOPE: ICD-10-PCS | Mod: S$GLB,,, | Performed by: STUDENT IN AN ORGANIZED HEALTH CARE EDUCATION/TRAINING PROGRAM

## 2021-07-15 PROCEDURE — 85025 COMPLETE CBC W/AUTO DIFF WBC: CPT | Performed by: STUDENT IN AN ORGANIZED HEALTH CARE EDUCATION/TRAINING PROGRAM

## 2021-07-15 PROCEDURE — 80053 COMPREHEN METABOLIC PANEL: CPT | Performed by: STUDENT IN AN ORGANIZED HEALTH CARE EDUCATION/TRAINING PROGRAM

## 2021-07-15 PROCEDURE — 99214 OFFICE O/P EST MOD 30 MIN: CPT | Mod: 25,S$GLB,, | Performed by: STUDENT IN AN ORGANIZED HEALTH CARE EDUCATION/TRAINING PROGRAM

## 2021-07-15 PROCEDURE — 99999 PR PBB SHADOW E&M-EST. PATIENT-LVL V: CPT | Mod: PBBFAC,,, | Performed by: STUDENT IN AN ORGANIZED HEALTH CARE EDUCATION/TRAINING PROGRAM

## 2021-07-15 PROCEDURE — 99214 PR OFFICE/OUTPT VISIT, EST, LEVL IV, 30-39 MIN: ICD-10-PCS | Mod: 25,S$GLB,, | Performed by: STUDENT IN AN ORGANIZED HEALTH CARE EDUCATION/TRAINING PROGRAM

## 2021-07-15 PROCEDURE — 83970 ASSAY OF PARATHORMONE: CPT | Performed by: STUDENT IN AN ORGANIZED HEALTH CARE EDUCATION/TRAINING PROGRAM

## 2021-07-15 PROCEDURE — 81002 URINALYSIS NONAUTO W/O SCOPE: CPT | Mod: S$GLB,,, | Performed by: STUDENT IN AN ORGANIZED HEALTH CARE EDUCATION/TRAINING PROGRAM

## 2021-07-15 PROCEDURE — 82306 VITAMIN D 25 HYDROXY: CPT | Performed by: STUDENT IN AN ORGANIZED HEALTH CARE EDUCATION/TRAINING PROGRAM

## 2021-07-15 PROCEDURE — 36415 COLL VENOUS BLD VENIPUNCTURE: CPT | Performed by: STUDENT IN AN ORGANIZED HEALTH CARE EDUCATION/TRAINING PROGRAM

## 2021-07-15 PROCEDURE — 80061 LIPID PANEL: CPT | Performed by: STUDENT IN AN ORGANIZED HEALTH CARE EDUCATION/TRAINING PROGRAM

## 2021-07-15 RX ORDER — CETIRIZINE HYDROCHLORIDE 10 MG/1
10 TABLET ORAL DAILY
COMMUNITY

## 2021-07-15 RX ORDER — LORATADINE 10 MG/1
10 TABLET ORAL DAILY
COMMUNITY
End: 2021-07-15

## 2021-07-15 RX ORDER — POLYETHYLENE GLYCOL 3350, SODIUM SULFATE ANHYDROUS, SODIUM BICARBONATE, SODIUM CHLORIDE, POTASSIUM CHLORIDE 236; 22.74; 6.74; 5.86; 2.97 G/4L; G/4L; G/4L; G/4L; G/4L
4 POWDER, FOR SOLUTION ORAL ONCE
Qty: 4000 ML | Refills: 0 | Status: SHIPPED | OUTPATIENT
Start: 2021-07-15 | End: 2021-07-15

## 2021-07-16 ENCOUNTER — HOSPITAL ENCOUNTER (OUTPATIENT)
Dept: RADIOLOGY | Facility: HOSPITAL | Age: 76
Discharge: HOME | End: 2021-07-16
Attending: INTERNAL MEDICINE
Payer: COMMERCIAL

## 2021-07-16 ENCOUNTER — PATIENT MESSAGE (OUTPATIENT)
Dept: INTERNAL MEDICINE | Facility: CLINIC | Age: 76
End: 2021-07-16

## 2021-07-16 VITALS — BODY MASS INDEX: 24.43 KG/M2 | WEIGHT: 152 LBS | HEIGHT: 66 IN

## 2021-07-16 DIAGNOSIS — Z12.31 OTHER SCREENING MAMMOGRAM: ICD-10-CM

## 2021-07-16 PROBLEM — M81.0 OSTEOPOROSIS: Chronic | Status: ACTIVE | Noted: 2017-12-18

## 2021-07-16 PROBLEM — E21.3 HYPERPARATHYROIDISM, UNSPECIFIED: Status: ACTIVE | Noted: 2021-07-16

## 2021-07-16 PROBLEM — N25.81 SECONDARY HYPERPARATHYROIDISM: Status: ACTIVE | Noted: 2021-07-16

## 2021-07-16 LAB — 25(OH)D3+25(OH)D2 SERPL-MCNC: 49 NG/ML (ref 30–96)

## 2021-07-16 PROCEDURE — 77067 SCR MAMMO BI INCL CAD: CPT | Mod: 26,,, | Performed by: RADIOLOGY

## 2021-07-16 PROCEDURE — 77067 MAMMO DIGITAL SCREENING BILAT WITH TOMO: ICD-10-PCS | Mod: 26,,, | Performed by: RADIOLOGY

## 2021-07-16 PROCEDURE — 77067 SCR MAMMO BI INCL CAD: CPT | Mod: TC

## 2021-07-16 PROCEDURE — 77063 MAMMO DIGITAL SCREENING BILAT WITH TOMO: ICD-10-PCS | Mod: 26,,, | Performed by: RADIOLOGY

## 2021-07-16 PROCEDURE — 77063 BREAST TOMOSYNTHESIS BI: CPT | Mod: 26,,, | Performed by: RADIOLOGY

## 2021-07-19 ENCOUNTER — ANESTHESIA (OUTPATIENT)
Dept: ENDOSCOPY | Facility: HOSPITAL | Age: 76
End: 2021-07-19
Payer: COMMERCIAL

## 2021-07-19 ENCOUNTER — ANESTHESIA EVENT (OUTPATIENT)
Dept: ENDOSCOPY | Facility: HOSPITAL | Age: 76
End: 2021-07-19
Payer: COMMERCIAL

## 2021-07-19 ENCOUNTER — HOSPITAL ENCOUNTER (OUTPATIENT)
Facility: HOSPITAL | Age: 76
Discharge: HOME | End: 2021-07-19
Attending: COLON & RECTAL SURGERY | Admitting: COLON & RECTAL SURGERY
Payer: COMMERCIAL

## 2021-07-19 VITALS
DIASTOLIC BLOOD PRESSURE: 78 MMHG | RESPIRATION RATE: 18 BRPM | HEIGHT: 66 IN | SYSTOLIC BLOOD PRESSURE: 162 MMHG | OXYGEN SATURATION: 98 % | TEMPERATURE: 98 F | WEIGHT: 152 LBS | BODY MASS INDEX: 24.43 KG/M2 | HEART RATE: 76 BPM

## 2021-07-19 DIAGNOSIS — Z86.010 HISTORY OF COLON POLYPS: Primary | ICD-10-CM

## 2021-07-19 PROBLEM — Z12.11 SCREENING FOR MALIGNANT NEOPLASM OF COLON: Status: ACTIVE | Noted: 2021-07-19

## 2021-07-19 PROCEDURE — 88305 TISSUE EXAM BY PATHOLOGIST: ICD-10-PCS | Mod: 26,,, | Performed by: STUDENT IN AN ORGANIZED HEALTH CARE EDUCATION/TRAINING PROGRAM

## 2021-07-19 PROCEDURE — 63600175 PHARM REV CODE 636 W HCPCS: Performed by: NURSE ANESTHETIST, CERTIFIED REGISTERED

## 2021-07-19 PROCEDURE — 45380 PR COLONOSCOPY,BIOPSY: ICD-10-PCS | Mod: 59,,, | Performed by: COLON & RECTAL SURGERY

## 2021-07-19 PROCEDURE — 37000008 HC ANESTHESIA 1ST 15 MINUTES: Performed by: COLON & RECTAL SURGERY

## 2021-07-19 PROCEDURE — 45380 COLONOSCOPY AND BIOPSY: CPT | Mod: 59,,, | Performed by: COLON & RECTAL SURGERY

## 2021-07-19 PROCEDURE — 25000003 PHARM REV CODE 250: Performed by: COLON & RECTAL SURGERY

## 2021-07-19 PROCEDURE — 88305 TISSUE EXAM BY PATHOLOGIST: CPT | Mod: 26,,, | Performed by: STUDENT IN AN ORGANIZED HEALTH CARE EDUCATION/TRAINING PROGRAM

## 2021-07-19 PROCEDURE — 37000009 HC ANESTHESIA EA ADD 15 MINS: Performed by: COLON & RECTAL SURGERY

## 2021-07-19 PROCEDURE — 45385 COLONOSCOPY W/LESION REMOVAL: CPT | Mod: 33,,, | Performed by: COLON & RECTAL SURGERY

## 2021-07-19 PROCEDURE — 45385 COLONOSCOPY W/LESION REMOVAL: CPT | Performed by: COLON & RECTAL SURGERY

## 2021-07-19 PROCEDURE — 27201089 HC SNARE, DISP (ANY): Performed by: COLON & RECTAL SURGERY

## 2021-07-19 PROCEDURE — E9220 PRA ENDO ANESTHESIA: HCPCS | Mod: 33,,, | Performed by: NURSE ANESTHETIST, CERTIFIED REGISTERED

## 2021-07-19 PROCEDURE — 27201012 HC FORCEPS, HOT/COLD, DISP: Performed by: COLON & RECTAL SURGERY

## 2021-07-19 PROCEDURE — E9220 PRA ENDO ANESTHESIA: ICD-10-PCS | Mod: 33,,, | Performed by: NURSE ANESTHETIST, CERTIFIED REGISTERED

## 2021-07-19 PROCEDURE — 45380 COLONOSCOPY AND BIOPSY: CPT | Performed by: COLON & RECTAL SURGERY

## 2021-07-19 PROCEDURE — 25000003 PHARM REV CODE 250: Performed by: NURSE ANESTHETIST, CERTIFIED REGISTERED

## 2021-07-19 PROCEDURE — 88305 TISSUE EXAM BY PATHOLOGIST: CPT | Performed by: STUDENT IN AN ORGANIZED HEALTH CARE EDUCATION/TRAINING PROGRAM

## 2021-07-19 PROCEDURE — 45385 PR COLONOSCOPY,REMV LESN,SNARE: ICD-10-PCS | Mod: 33,,, | Performed by: COLON & RECTAL SURGERY

## 2021-07-19 RX ORDER — SODIUM CHLORIDE 9 MG/ML
INJECTION, SOLUTION INTRAVENOUS CONTINUOUS
Status: DISCONTINUED | OUTPATIENT
Start: 2021-07-19 | End: 2021-07-19 | Stop reason: HOSPADM

## 2021-07-19 RX ORDER — PROPOFOL 10 MG/ML
VIAL (ML) INTRAVENOUS
Status: DISCONTINUED | OUTPATIENT
Start: 2021-07-19 | End: 2021-07-19

## 2021-07-19 RX ORDER — PROPOFOL 10 MG/ML
VIAL (ML) INTRAVENOUS CONTINUOUS PRN
Status: DISCONTINUED | OUTPATIENT
Start: 2021-07-19 | End: 2021-07-19

## 2021-07-19 RX ORDER — LIDOCAINE HYDROCHLORIDE 20 MG/ML
INJECTION INTRAVENOUS
Status: DISCONTINUED | OUTPATIENT
Start: 2021-07-19 | End: 2021-07-19

## 2021-07-19 RX ADMIN — PROPOFOL 30 MG: 10 INJECTION, EMULSION INTRAVENOUS at 12:07

## 2021-07-19 RX ADMIN — SODIUM CHLORIDE: 0.9 INJECTION, SOLUTION INTRAVENOUS at 11:07

## 2021-07-19 RX ADMIN — LIDOCAINE HYDROCHLORIDE 50 MG: 20 INJECTION, SOLUTION INTRAVENOUS at 11:07

## 2021-07-19 RX ADMIN — Medication 150 MCG/KG/MIN: at 11:07

## 2021-07-19 RX ADMIN — PROPOFOL 50 MG: 10 INJECTION, EMULSION INTRAVENOUS at 11:07

## 2021-07-21 ENCOUNTER — OFFICE VISIT (OUTPATIENT)
Dept: ENDOCRINOLOGY | Facility: CLINIC | Age: 76
End: 2021-07-21
Payer: COMMERCIAL

## 2021-07-21 VITALS
DIASTOLIC BLOOD PRESSURE: 81 MMHG | OXYGEN SATURATION: 99 % | SYSTOLIC BLOOD PRESSURE: 130 MMHG | WEIGHT: 157.19 LBS | HEIGHT: 66 IN | HEART RATE: 65 BPM | BODY MASS INDEX: 25.26 KG/M2

## 2021-07-21 DIAGNOSIS — M81.0 OSTEOPOROSIS, UNSPECIFIED OSTEOPOROSIS TYPE, UNSPECIFIED PATHOLOGICAL FRACTURE PRESENCE: ICD-10-CM

## 2021-07-21 DIAGNOSIS — M81.0 AGE-RELATED OSTEOPOROSIS WITHOUT CURRENT PATHOLOGICAL FRACTURE: Primary | Chronic | ICD-10-CM

## 2021-07-21 DIAGNOSIS — E21.3 HYPERPARATHYROIDISM, UNSPECIFIED: ICD-10-CM

## 2021-07-21 PROCEDURE — 99204 PR OFFICE/OUTPT VISIT, NEW, LEVL IV, 45-59 MIN: ICD-10-PCS | Mod: S$GLB,,, | Performed by: NURSE PRACTITIONER

## 2021-07-21 PROCEDURE — 99999 PR PBB SHADOW E&M-EST. PATIENT-LVL IV: CPT | Mod: PBBFAC,,, | Performed by: NURSE PRACTITIONER

## 2021-07-21 PROCEDURE — 99204 OFFICE O/P NEW MOD 45 MIN: CPT | Mod: S$GLB,,, | Performed by: NURSE PRACTITIONER

## 2021-07-21 PROCEDURE — 99999 PR PBB SHADOW E&M-EST. PATIENT-LVL IV: ICD-10-PCS | Mod: PBBFAC,,, | Performed by: NURSE PRACTITIONER

## 2021-07-22 ENCOUNTER — TELEPHONE (OUTPATIENT)
Dept: ENDOSCOPY | Facility: HOSPITAL | Age: 76
End: 2021-07-22

## 2021-07-22 DIAGNOSIS — K31.89 GASTRIC NODULE: Primary | ICD-10-CM

## 2021-07-23 ENCOUNTER — TELEPHONE (OUTPATIENT)
Dept: ENDOSCOPY | Facility: HOSPITAL | Age: 76
End: 2021-07-23

## 2021-07-23 LAB
FINAL PATHOLOGIC DIAGNOSIS: NORMAL
GROSS: NORMAL
Lab: NORMAL
MICROSCOPIC EXAM: NORMAL

## 2021-07-26 ENCOUNTER — HOSPITAL ENCOUNTER (OUTPATIENT)
Dept: RADIOLOGY | Facility: CLINIC | Age: 76
Discharge: HOME | End: 2021-07-26
Attending: NURSE PRACTITIONER
Payer: COMMERCIAL

## 2021-07-26 DIAGNOSIS — M81.0 AGE-RELATED OSTEOPOROSIS WITHOUT CURRENT PATHOLOGICAL FRACTURE: Chronic | ICD-10-CM

## 2021-07-26 DIAGNOSIS — E21.3 HYPERPARATHYROIDISM, UNSPECIFIED: ICD-10-CM

## 2021-07-26 PROCEDURE — 77080 DXA BONE DENSITY AXIAL: CPT | Mod: 26,,, | Performed by: INTERNAL MEDICINE

## 2021-07-26 PROCEDURE — 77080 DXA BONE DENSITY AXIAL: CPT | Mod: TC

## 2021-07-26 PROCEDURE — 77080 DEXA BONE DENSITY SPINE HIP: ICD-10-PCS | Mod: 26,,, | Performed by: INTERNAL MEDICINE

## 2021-07-28 ENCOUNTER — LAB VISIT (OUTPATIENT)
Dept: LAB | Facility: HOSPITAL | Age: 76
End: 2021-07-28
Payer: COMMERCIAL

## 2021-07-28 DIAGNOSIS — M81.0 AGE-RELATED OSTEOPOROSIS WITHOUT CURRENT PATHOLOGICAL FRACTURE: Chronic | ICD-10-CM

## 2021-07-28 DIAGNOSIS — E21.3 HYPERPARATHYROIDISM, UNSPECIFIED: ICD-10-CM

## 2021-07-28 LAB
ALBUMIN SERPL BCP-MCNC: 3.9 G/DL (ref 3.5–5.2)
ANION GAP SERPL CALC-SCNC: 9 MMOL/L (ref 8–16)
BUN SERPL-MCNC: 9 MG/DL (ref 8–23)
CALCIUM SERPL-MCNC: 10.2 MG/DL (ref 8.7–10.5)
CHLORIDE SERPL-SCNC: 106 MMOL/L (ref 95–110)
CO2 SERPL-SCNC: 25 MMOL/L (ref 23–29)
CREAT SERPL-MCNC: 0.8 MG/DL (ref 0.5–1.4)
EST. GFR  (AFRICAN AMERICAN): >60 ML/MIN/1.73 M^2
EST. GFR  (NON AFRICAN AMERICAN): >60 ML/MIN/1.73 M^2
GLUCOSE SERPL-MCNC: 96 MG/DL (ref 70–110)
PHOSPHATE SERPL-MCNC: 2.8 MG/DL (ref 2.7–4.5)
POTASSIUM SERPL-SCNC: 4.6 MMOL/L (ref 3.5–5.1)
PTH-INTACT SERPL-MCNC: 129 PG/ML (ref 9–77)
SODIUM SERPL-SCNC: 140 MMOL/L (ref 136–145)
TSH SERPL DL<=0.005 MIU/L-ACNC: 2.56 UIU/ML (ref 0.4–4)

## 2021-07-28 PROCEDURE — 83516 IMMUNOASSAY NONANTIBODY: CPT | Performed by: NURSE PRACTITIONER

## 2021-07-28 PROCEDURE — 80069 RENAL FUNCTION PANEL: CPT | Performed by: NURSE PRACTITIONER

## 2021-07-28 PROCEDURE — 84443 ASSAY THYROID STIM HORMONE: CPT | Performed by: NURSE PRACTITIONER

## 2021-07-28 PROCEDURE — 83970 ASSAY OF PARATHORMONE: CPT | Performed by: NURSE PRACTITIONER

## 2021-08-02 LAB — TTG IGA SER-ACNC: 3 UNITS

## 2021-08-07 ENCOUNTER — TELEPHONE (OUTPATIENT)
Dept: ENDOSCOPY | Facility: HOSPITAL | Age: 76
End: 2021-08-07

## 2021-09-15 ENCOUNTER — TELEPHONE (OUTPATIENT)
Dept: ENDOSCOPY | Facility: HOSPITAL | Age: 76
End: 2021-09-15

## 2021-10-04 ENCOUNTER — DOCUMENTATION ONLY (OUTPATIENT)
Dept: REHABILITATION | Facility: HOSPITAL | Age: 76
End: 2021-10-04

## 2021-10-11 ENCOUNTER — TELEPHONE (OUTPATIENT)
Dept: ENDOSCOPY | Facility: HOSPITAL | Age: 76
End: 2021-10-11

## 2021-10-20 ENCOUNTER — HOSPITAL ENCOUNTER (OUTPATIENT)
Dept: RADIOLOGY | Facility: HOSPITAL | Age: 76
Discharge: HOME | End: 2021-10-20
Attending: PHYSICIAN ASSISTANT
Payer: COMMERCIAL

## 2021-10-20 ENCOUNTER — OFFICE VISIT (OUTPATIENT)
Dept: ORTHOPEDICS | Facility: CLINIC | Age: 76
End: 2021-10-20
Payer: COMMERCIAL

## 2021-10-20 DIAGNOSIS — M89.8X1 CLAVICLE PAIN: ICD-10-CM

## 2021-10-20 DIAGNOSIS — M89.8X1 CLAVICLE PAIN: Primary | ICD-10-CM

## 2021-10-20 PROCEDURE — 73000 XR CLAVICLE RIGHT: ICD-10-PCS | Mod: 26,RT,, | Performed by: RADIOLOGY

## 2021-10-20 PROCEDURE — 99203 OFFICE O/P NEW LOW 30 MIN: CPT | Mod: S$GLB,,, | Performed by: PHYSICIAN ASSISTANT

## 2021-10-20 PROCEDURE — 73000 X-RAY EXAM OF COLLAR BONE: CPT | Mod: TC,RT

## 2021-10-20 PROCEDURE — 99203 PR OFFICE/OUTPT VISIT, NEW, LEVL III, 30-44 MIN: ICD-10-PCS | Mod: S$GLB,,, | Performed by: PHYSICIAN ASSISTANT

## 2021-10-20 PROCEDURE — 99999 PR PBB SHADOW E&M-EST. PATIENT-LVL III: CPT | Mod: PBBFAC,,, | Performed by: PHYSICIAN ASSISTANT

## 2021-10-20 PROCEDURE — 99999 PR PBB SHADOW E&M-EST. PATIENT-LVL III: ICD-10-PCS | Mod: PBBFAC,,, | Performed by: PHYSICIAN ASSISTANT

## 2021-10-20 PROCEDURE — 73000 X-RAY EXAM OF COLLAR BONE: CPT | Mod: 26,RT,, | Performed by: RADIOLOGY

## 2021-10-20 RX ORDER — MELOXICAM 15 MG/1
15 TABLET ORAL DAILY
Qty: 30 TABLET | Refills: 0 | Status: SHIPPED | OUTPATIENT
Start: 2021-10-20 | End: 2021-11-19

## 2021-10-23 ENCOUNTER — IMMUNIZATION (OUTPATIENT)
Dept: INTERNAL MEDICINE | Facility: CLINIC | Age: 76
End: 2021-10-23
Payer: COMMERCIAL

## 2021-10-23 DIAGNOSIS — Z23 NEED FOR VACCINATION: Primary | ICD-10-CM

## 2021-10-23 PROCEDURE — 0003A COVID-19, MRNA, LNP-S, PF, 30 MCG/0.3 ML DOSE VACCINE: CPT | Mod: CV19,PBBFAC | Performed by: INTERNAL MEDICINE

## 2021-10-23 PROCEDURE — 91300 COVID-19, MRNA, LNP-S, PF, 30 MCG/0.3 ML DOSE VACCINE: CPT | Mod: PBBFAC | Performed by: INTERNAL MEDICINE

## 2021-11-07 ENCOUNTER — OFFICE VISIT (OUTPATIENT)
Dept: URGENT CARE | Facility: CLINIC | Age: 76
End: 2021-11-07
Payer: COMMERCIAL

## 2021-11-07 VITALS
OXYGEN SATURATION: 98 % | TEMPERATURE: 98 F | DIASTOLIC BLOOD PRESSURE: 83 MMHG | SYSTOLIC BLOOD PRESSURE: 148 MMHG | BODY MASS INDEX: 25.07 KG/M2 | HEART RATE: 77 BPM | RESPIRATION RATE: 16 BRPM | WEIGHT: 156 LBS | HEIGHT: 66 IN

## 2021-11-07 DIAGNOSIS — R42 DIZZINESS: ICD-10-CM

## 2021-11-07 DIAGNOSIS — R03.0 ELEVATED BLOOD PRESSURE READING WITHOUT DIAGNOSIS OF HYPERTENSION: ICD-10-CM

## 2021-11-07 DIAGNOSIS — M54.50 ACUTE BILATERAL LOW BACK PAIN WITHOUT SCIATICA: ICD-10-CM

## 2021-11-07 DIAGNOSIS — R39.89 SENSATION OF PRESSURE IN BLADDER AREA: ICD-10-CM

## 2021-11-07 DIAGNOSIS — N30.01 ACUTE CYSTITIS WITH HEMATURIA: Primary | ICD-10-CM

## 2021-11-07 LAB
BILIRUB UR QL STRIP: NEGATIVE
GLUCOSE UR QL STRIP: NEGATIVE
KETONES UR QL STRIP: NEGATIVE
LEUKOCYTE ESTERASE UR QL STRIP: POSITIVE
PH, POC UA: 5
POC BLOOD, URINE: POSITIVE
POC NITRATES, URINE: NEGATIVE
PROT UR QL STRIP: POSITIVE
SP GR UR STRIP: 1.02 (ref 1–1.03)
UROBILINOGEN UR STRIP-ACNC: NORMAL (ref 0.1–1.1)

## 2021-11-07 PROCEDURE — 93005 EKG 12-LEAD: ICD-10-PCS | Mod: S$GLB,,, | Performed by: NURSE PRACTITIONER

## 2021-11-07 PROCEDURE — 93010 EKG 12-LEAD: ICD-10-PCS | Mod: S$GLB,,, | Performed by: INTERNAL MEDICINE

## 2021-11-07 PROCEDURE — 81003 URINALYSIS AUTO W/O SCOPE: CPT | Mod: QW,S$GLB,, | Performed by: NURSE PRACTITIONER

## 2021-11-07 PROCEDURE — 99213 OFFICE O/P EST LOW 20 MIN: CPT | Mod: 25,S$GLB,, | Performed by: NURSE PRACTITIONER

## 2021-11-07 PROCEDURE — 93005 ELECTROCARDIOGRAM TRACING: CPT | Mod: S$GLB,,, | Performed by: NURSE PRACTITIONER

## 2021-11-07 PROCEDURE — 93010 ELECTROCARDIOGRAM REPORT: CPT | Mod: S$GLB,,, | Performed by: INTERNAL MEDICINE

## 2021-11-07 PROCEDURE — 87086 URINE CULTURE/COLONY COUNT: CPT | Performed by: NURSE PRACTITIONER

## 2021-11-07 PROCEDURE — 99213 PR OFFICE/OUTPT VISIT, EST, LEVL III, 20-29 MIN: ICD-10-PCS | Mod: 25,S$GLB,, | Performed by: NURSE PRACTITIONER

## 2021-11-07 PROCEDURE — 81003 POCT URINALYSIS, DIPSTICK, AUTOMATED, W/O SCOPE: ICD-10-PCS | Mod: QW,S$GLB,, | Performed by: NURSE PRACTITIONER

## 2021-11-07 RX ORDER — PHENAZOPYRIDINE HYDROCHLORIDE 200 MG/1
200 TABLET, FILM COATED ORAL 3 TIMES DAILY PRN
Qty: 9 TABLET | Refills: 0 | Status: SHIPPED | OUTPATIENT
Start: 2021-11-07 | End: 2021-11-10

## 2021-11-07 RX ORDER — CIPROFLOXACIN 500 MG/1
500 TABLET ORAL 2 TIMES DAILY
Qty: 14 TABLET | Refills: 0 | Status: SHIPPED | OUTPATIENT
Start: 2021-11-07 | End: 2021-11-14

## 2021-11-08 LAB
BACTERIA UR CULT: NORMAL
BACTERIA UR CULT: NORMAL

## 2021-11-09 ENCOUNTER — TELEPHONE (OUTPATIENT)
Dept: ENDOSCOPY | Facility: HOSPITAL | Age: 76
End: 2021-11-09
Payer: COMMERCIAL

## 2021-11-09 ENCOUNTER — TELEPHONE (OUTPATIENT)
Dept: URGENT CARE | Facility: CLINIC | Age: 76
End: 2021-11-09
Payer: COMMERCIAL

## 2021-11-11 ENCOUNTER — TELEPHONE (OUTPATIENT)
Dept: URGENT CARE | Facility: CLINIC | Age: 76
End: 2021-11-11
Payer: COMMERCIAL

## 2021-12-01 ENCOUNTER — TELEPHONE (OUTPATIENT)
Dept: ENDOSCOPY | Facility: HOSPITAL | Age: 76
End: 2021-12-01
Payer: COMMERCIAL

## 2022-01-01 NOTE — PROGRESS NOTES
Subjective:       Patient ID: Kj Chávez is a 74 y.o. female.    Chief Complaint: Vaginal Itching and Urinary Frequency    HPI     Established pt of Maile Hilton MD (new to me)     C/o vaginal itching and slight vaginal discharge(white, creamy) for the past 2 weeks. Mild dysuria. No vaginal sores, bleeding. She has tried Advil for the symptoms. Not sexually active.       Per chart review seen in ID March 2019 for asymptomatic bacteruria. ?Hx of recurrent UTIs.     Last seen by GYN May 2018 for vaginal itching, prescribed estrace.     Past Medical History:   Diagnosis Date    Allergy     Colon polyp     Gastric nodule     GERD (gastroesophageal reflux disease)     Hypertension     mild, resolved     Osteoporosis      Social History     Tobacco Use    Smoking status: Never Smoker    Smokeless tobacco: Never Used   Substance Use Topics    Alcohol use: No    Drug use: No     Review of patient's allergies indicates:   Allergen Reactions    Atorvastatin      Liver enzyme elevation    Sulfa (sulfonamide antibiotics) Swelling    Codeine Anxiety           Review of Systems   Constitutional: Negative for chills, diaphoresis and fever.   Respiratory: Negative for cough and shortness of breath.    Gastrointestinal: Positive for constipation (improved with OTC ducolax). Negative for abdominal pain, nausea and vomiting.        Brbpr  Straining   Genitourinary: Positive for dysuria, frequency and vaginal discharge. Negative for flank pain.         Objective: /80   Pulse 66   Wt 72.9 kg (160 lb 11.5 oz)   LMP 05/30/2000 (Approximate)   SpO2 96%   BMI 25.94 kg/m²         Physical Exam  Vitals signs reviewed.   Constitutional:       General: She is not in acute distress.     Appearance: She is well-developed.   HENT:      Head: Normocephalic and atraumatic.   Cardiovascular:      Rate and Rhythm: Normal rate and regular rhythm.      Heart sounds: No murmur. No friction rub.   Pulmonary:       Effort: Pulmonary effort is normal.      Breath sounds: Normal breath sounds. No wheezing or rales.   Abdominal:      General: Bowel sounds are normal.      Palpations: Abdomen is soft.      Tenderness: There is no abdominal tenderness.   Genitourinary:     Labia:         Right: No rash, tenderness or lesion.         Left: No rash or lesion.       Cervix: Discharge (scant yellow) present. No erythema or cervical bleeding.      Rectum: No anal fissure, external hemorrhoid or internal hemorrhoid.   Skin:     General: Skin is warm and dry.      Findings: No rash.   Neurological:      Mental Status: She is alert.         Assessment:       1. Vaginal itching    2. Urinary frequency    3. Vaginal discharge        Plan:         Kj was seen today for vaginal itching and urinary frequency.    Diagnoses and all orders for this visit:    Vaginal itching  -     VAGINOSIS SCREEN BY DNA PROBE    Urinary frequency  -     POCT URINALYSIS  -     URINALYSIS  -     Urine culture    Vaginal discharge  -     VAGINOSIS SCREEN BY DNA PROBE      Will follow up swab and UA results    Further recs pending results  If UA/Vaginal swab unremarkable, will advise GYN clinic f/u    Susi Inman PA-C         [phone]

## 2022-01-05 ENCOUNTER — TELEPHONE (OUTPATIENT)
Dept: ENDOSCOPY | Facility: HOSPITAL | Age: 77
End: 2022-01-05
Payer: COMMERCIAL

## 2022-01-14 ENCOUNTER — TELEPHONE (OUTPATIENT)
Dept: OPHTHALMOLOGY | Facility: CLINIC | Age: 77
End: 2022-01-14
Payer: COMMERCIAL

## 2022-01-14 ENCOUNTER — OFFICE VISIT (OUTPATIENT)
Dept: OPTOMETRY | Facility: CLINIC | Age: 77
End: 2022-01-14
Payer: COMMERCIAL

## 2022-01-14 DIAGNOSIS — H20.9 IRITIS, TRAUMATIC: Primary | ICD-10-CM

## 2022-01-14 PROCEDURE — 1125F PR PAIN SEVERITY QUANTIFIED, PAIN PRESENT: ICD-10-PCS | Mod: CPTII,S$GLB,, | Performed by: OPTOMETRIST

## 2022-01-14 PROCEDURE — 3288F PR FALLS RISK ASSESSMENT DOCUMENTED: ICD-10-PCS | Mod: CPTII,S$GLB,, | Performed by: OPTOMETRIST

## 2022-01-14 PROCEDURE — 92014 COMPRE OPH EXAM EST PT 1/>: CPT | Mod: S$GLB,,, | Performed by: OPTOMETRIST

## 2022-01-14 PROCEDURE — 1125F AMNT PAIN NOTED PAIN PRSNT: CPT | Mod: CPTII,S$GLB,, | Performed by: OPTOMETRIST

## 2022-01-14 PROCEDURE — 1159F PR MEDICATION LIST DOCUMENTED IN MEDICAL RECORD: ICD-10-PCS | Mod: CPTII,S$GLB,, | Performed by: OPTOMETRIST

## 2022-01-14 PROCEDURE — 92014 PR EYE EXAM, EST PATIENT,COMPREHESV: ICD-10-PCS | Mod: S$GLB,,, | Performed by: OPTOMETRIST

## 2022-01-14 PROCEDURE — 3288F FALL RISK ASSESSMENT DOCD: CPT | Mod: CPTII,S$GLB,, | Performed by: OPTOMETRIST

## 2022-01-14 PROCEDURE — 99999 PR PBB SHADOW E&M-EST. PATIENT-LVL II: ICD-10-PCS | Mod: PBBFAC,,, | Performed by: OPTOMETRIST

## 2022-01-14 PROCEDURE — 1101F PT FALLS ASSESS-DOCD LE1/YR: CPT | Mod: CPTII,S$GLB,, | Performed by: OPTOMETRIST

## 2022-01-14 PROCEDURE — 1159F MED LIST DOCD IN RCRD: CPT | Mod: CPTII,S$GLB,, | Performed by: OPTOMETRIST

## 2022-01-14 PROCEDURE — 1101F PR PT FALLS ASSESS DOC 0-1 FALLS W/OUT INJ PAST YR: ICD-10-PCS | Mod: CPTII,S$GLB,, | Performed by: OPTOMETRIST

## 2022-01-14 PROCEDURE — 99999 PR PBB SHADOW E&M-EST. PATIENT-LVL II: CPT | Mod: PBBFAC,,, | Performed by: OPTOMETRIST

## 2022-01-14 RX ORDER — TOBRAMYCIN AND DEXAMETHASONE 3; 1 MG/ML; MG/ML
1 SUSPENSION/ DROPS OPHTHALMIC
Qty: 5 ML | Refills: 0 | Status: SHIPPED | OUTPATIENT
Start: 2022-01-14 | End: 2022-01-21

## 2022-01-14 NOTE — PROGRESS NOTES
HPI     Urgent visit   Patient complaints of pain OD from sun visor in car.patient states her OD   hit visor when getting in the car. She states she woke up to swelling pain   level 8    Last edited by ELLA Kay on 1/14/2022 10:06 AM. (History)            Assessment /Plan     For exam results, see Encounter Report.    Iritis, traumatic  -     tobramycin-dexamethasone 0.3-0.1% (TOBRADEX) 0.3-0.1 % DrpS; Place 1 drop into the right eye every 4 (four) hours while awake. for 7 days  Dispense: 5 mL; Refill: 0  -Cold compresses  -Tobradex QID x 1 wk       RTC PRN Blur, Pain, Photo

## 2022-01-14 NOTE — TELEPHONE ENCOUNTER
Spoke to patient and scheduled urgent exam for this morning with Dr Gabbi SMITH       ----- Message from Glenis Potts MA sent at 1/14/2022  7:40 AM CST -----  Contact: Pt    ----- Message -----  From: Jackie Sullivan  Sent: 1/14/2022   7:01 AM CST  To: [facility] Clinical Support Staff    Type:  Same Day Appointment Request    Caller is requesting a same day appointment.  Caller declined first available appointment listed below.    Name of Caller:Pt  When is the first available appointment?01/20/22  Symptoms:Pt hit eye on visor on Tuesday and this pt eye is swollen closed and painful  Best Call Back Number:[phone]  Additional Information: Pt asked for a call back with appt date and time

## 2022-01-18 ENCOUNTER — TELEPHONE (OUTPATIENT)
Dept: ENDOSCOPY | Facility: HOSPITAL | Age: 77
End: 2022-01-18
Payer: COMMERCIAL

## 2022-01-25 ENCOUNTER — OFFICE VISIT (OUTPATIENT)
Dept: URGENT CARE | Facility: CLINIC | Age: 77
End: 2022-01-25
Payer: COMMERCIAL

## 2022-01-25 VITALS
BODY MASS INDEX: 25.07 KG/M2 | WEIGHT: 156 LBS | TEMPERATURE: 98 F | DIASTOLIC BLOOD PRESSURE: 68 MMHG | HEART RATE: 73 BPM | HEIGHT: 66 IN | OXYGEN SATURATION: 97 % | RESPIRATION RATE: 18 BRPM | SYSTOLIC BLOOD PRESSURE: 139 MMHG

## 2022-01-25 DIAGNOSIS — H10.9 CONJUNCTIVITIS OF RIGHT EYE, UNSPECIFIED CONJUNCTIVITIS TYPE: Primary | ICD-10-CM

## 2022-01-25 DIAGNOSIS — H57.11 PAIN OF RIGHT EYE: ICD-10-CM

## 2022-01-25 PROCEDURE — 99213 OFFICE O/P EST LOW 20 MIN: CPT | Mod: S$GLB,,, | Performed by: NURSE PRACTITIONER

## 2022-01-25 PROCEDURE — 1160F PR REVIEW ALL MEDS BY PRESCRIBER/CLIN PHARMACIST DOCUMENTED: ICD-10-PCS | Mod: CPTII,S$GLB,, | Performed by: NURSE PRACTITIONER

## 2022-01-25 PROCEDURE — 3075F SYST BP GE 130 - 139MM HG: CPT | Mod: CPTII,S$GLB,, | Performed by: NURSE PRACTITIONER

## 2022-01-25 PROCEDURE — 3075F PR MOST RECENT SYSTOLIC BLOOD PRESS GE 130-139MM HG: ICD-10-PCS | Mod: CPTII,S$GLB,, | Performed by: NURSE PRACTITIONER

## 2022-01-25 PROCEDURE — 1125F PR PAIN SEVERITY QUANTIFIED, PAIN PRESENT: ICD-10-PCS | Mod: CPTII,S$GLB,, | Performed by: NURSE PRACTITIONER

## 2022-01-25 PROCEDURE — 1159F MED LIST DOCD IN RCRD: CPT | Mod: CPTII,S$GLB,, | Performed by: NURSE PRACTITIONER

## 2022-01-25 PROCEDURE — 70200 X-RAY EXAM OF EYE SOCKETS: CPT | Mod: S$GLB,,, | Performed by: RADIOLOGY

## 2022-01-25 PROCEDURE — 3078F PR MOST RECENT DIASTOLIC BLOOD PRESSURE < 80 MM HG: ICD-10-PCS | Mod: CPTII,S$GLB,, | Performed by: NURSE PRACTITIONER

## 2022-01-25 PROCEDURE — 1159F PR MEDICATION LIST DOCUMENTED IN MEDICAL RECORD: ICD-10-PCS | Mod: CPTII,S$GLB,, | Performed by: NURSE PRACTITIONER

## 2022-01-25 PROCEDURE — 1160F RVW MEDS BY RX/DR IN RCRD: CPT | Mod: CPTII,S$GLB,, | Performed by: NURSE PRACTITIONER

## 2022-01-25 PROCEDURE — 1125F AMNT PAIN NOTED PAIN PRSNT: CPT | Mod: CPTII,S$GLB,, | Performed by: NURSE PRACTITIONER

## 2022-01-25 PROCEDURE — 99213 PR OFFICE/OUTPT VISIT, EST, LEVL III, 20-29 MIN: ICD-10-PCS | Mod: S$GLB,,, | Performed by: NURSE PRACTITIONER

## 2022-01-25 PROCEDURE — 3078F DIAST BP <80 MM HG: CPT | Mod: CPTII,S$GLB,, | Performed by: NURSE PRACTITIONER

## 2022-01-25 PROCEDURE — 70200 XR ORBITS MIN 4 VIEWS: ICD-10-PCS | Mod: S$GLB,,, | Performed by: RADIOLOGY

## 2022-01-25 RX ORDER — CIPROFLOXACIN HYDROCHLORIDE 3 MG/ML
2 SOLUTION/ DROPS OPHTHALMIC 4 TIMES DAILY
Qty: 5 ML | Refills: 0 | Status: SHIPPED | OUTPATIENT
Start: 2022-01-25 | End: 2022-02-01

## 2022-01-25 NOTE — PATIENT INSTRUCTIONS
Patient Education       Conjunctivitis ([place]) Discharge Instructions   About this topic   The medical name for pink eye is conjunctivitis. Your eye is irritated or infected. It is red and irritated. Your eye may also itch, burn, or be sensitive to light. If an infection is causing your pink eye, it is easy to spread from person to person.  Infections are often caused by viruses and antibiotics wont help. Most of the time, pink eye will go away on its own without treatment after a few days.  If the doctor thinks you have a bacterial infection in your eye, you will need antibiotics to treat the infection. It is important to take all of your antibiotics even if your eye starts to feel better.       What care is needed at home?   · Ask your doctor what you need to do when you go home. Make sure you ask questions if you do not understand what the doctor says.  · Wash your hands before touching your eyes. Gently remove your eye drainage or crusting with a clean cloth and warm water.  · Avoid pollen if an allergy is causing your pink eye. Stay inside as much as you can with the windows closed during peak allergy seasons.  · Lubricating eye drops or allergy eye drops may help your eye feel better. Make sure to read the directions carefully. Wash your hands with care before and after you touch your eye.  · When you use eye drops, take care not to touch the bottle or dropper to your eye.  · If you wear contact lenses, you will need to stop wearing them for a short time until your symptoms go away. If your contacts are disposable, start with fresh ones after your eye gets better. If not, clean your contacts with care. Clean your contact case thoroughly or get a new one.  · Avoid sharing towels, washcloths, bedding, or personal items when you have pink eye.  · You may need to stay out of work or school for a few days.  What follow-up care is needed?   Your doctor may ask you to make visits to the office to check on your  progress. Be sure to keep these visits.  What drugs may be needed?   The doctor may order drugs based on the cause of your health problem. Talk to your doctor about what drugs you need to take.   Will physical activity be limited?   Your physical activities will not be limited. Remember, this infection spreads easily from person to person. Ask your doctor when you can go back to work or school.  What problems could happen?   You may be infected again from someone in your house, workplace, or school.  What can be done to prevent this health problem?   Good hygiene can help prevent the spread of this infection.  · Wash your hands well and often, after touching your face, and after you cough or sneeze.  · Do not share eye make-up or eye drops with others.  · Do not share pillows or towels with others.  · Clean contact lenses daily. Do not sleep in them unless your eye doctor says it is OK.  When do I need to call the doctor?   · You have trouble seeing clearly after blinking.  · Your eye is still red or has drainage after 3 days.  · You have eye pain that is getting worse.  [employer]: Helping You Understand   The [employer]ach [employer] Method helps you understand the information we are giving you. After you talk with the staff, tell them in your own words what you learned. This helps to make sure the staff has described each thing clearly. It also helps to explain things that may have been confusing. Before going home, make sure you can do these:  · I can tell you about my condition.  · I can tell you how to care for my eye.  · I can tell you what I will do if I have eye pain or blurry eyesight.  Where can I learn more?   FamilyDoctor.org  http://familydoctor.org/familydoctor/en/diseases-conditions/allergic-conjunctivitis.html   Kids Health  http://kidshealth.org/en/parents/conjunctivitis.html?ref=search&WT.ac=mrv-g-lgkl-jh-qifnss-uea   N[facility] Choices  https://www.nhs.uk/conditions/conjunctivitis/   Last Reviewed Date    2021-06-10  Consumer Information Use and Disclaimer   This information is not specific medical advice and does not replace information you receive from your health care provider. This is only a brief summary of general information. It does NOT include all information about conditions, illnesses, injuries, tests, procedures, treatments, therapies, discharge instructions or life-style choices that may apply to you. You must talk with your health care provider for complete information about your health and treatment options. This information should not be used to decide whether or not to accept your health care providers advice, instructions or recommendations. Only your health care provider has the knowledge and training to provide advice that is right for you.  Copyright   Copyright © 2021 [employer] and its affiliates and/or licensors. All rights reserved.

## 2022-01-25 NOTE — PROGRESS NOTES
"Subjective:       Patient ID: Kj Chávez is a 76 y.o. female.    Vitals:  height is 5' 6" (1.676 m) and weight is 70.8 kg (156 lb). Her oral temperature is 97.8 °F (36.6 °C). Her blood pressure is 139/68 and her pulse is 73. Her respiration is 18 and oxygen saturation is 97%.     Chief Complaint: Eye Pain    Pt states that she hit her right eye on a plexi glass sun visor on 01/11/22 she subsequently developed eye pain, redness and swelling and seen by Ophthalmology on 1/14/22.  Treated w tobramycin/dexa gtts and symptoms improved.  Yesterday woke up with R eye pain and redness with sensitivity to light. Pain is exacerbated by looking toward the right and up.  Eye ball is tender to the upper eye socket.     Eye Pain   The right eye is affected. This is a recurrent problem. The current episode started today. The problem occurs constantly. The problem has been unchanged. The injury mechanism was a direct trauma. The pain is at a severity of 8/10. The pain is severe. There is known exposure to pink eye. She does not wear contacts. Associated symptoms include eye redness and photophobia. Pertinent negatives include no blurred vision, eye discharge, double vision, fever, foreign body sensation, itching or weakness. She has tried eye drops for the symptoms. The treatment provided moderate relief.       Constitution: Negative for chills, sweating, fatigue and fever.   HENT: Negative for facial swelling, facial trauma, congestion, postnasal drip, sinus pain and sinus pressure.    Neck: Negative for neck pain and neck stiffness.   Cardiovascular: Negative.    Eyes: Positive for eye pain, eye redness, photophobia and eyelid swelling. Negative for eye trauma, foreign body in eye, eye discharge, eye itching, vision loss, double vision and blurred vision.   Respiratory: Negative.    Skin: Negative for color change, pale, rash, wound, abrasion and bruising.   Neurological: Negative for dizziness, light-headedness, loss of " balance, headaches, disorientation, altered mental status, numbness and tingling.   Psychiatric/Behavioral: Negative for altered mental status and disorientation.       Objective:      Physical Exam   Constitutional:  Non-toxic appearance. She does not appear ill. No distress.   HENT:   Head: Normocephalic and atraumatic.   Eyes: Pupils are equal, round, and reactive to light. Right eye exhibits no discharge. Left eye exhibits no discharge. No scleral icterus.   Slit lamp exam:       The right eye shows anterior chamber quiet and photophobia. The right eye shows no corneal ulcer, no foreign body, no hyphema and no anterior chamber bulge.          extraocular movement intact      Comments: R eye conjunctiva mildly injected, no drainage   Abdominal: Normal appearance.   Neurological: She is alert.   Skin: Skin is not diaphoretic.   Nursing note and vitals reviewed.         Hearing Screening    125Hz 250Hz 500Hz 1000Hz 2000Hz 3000Hz 4000Hz 6000Hz 8000Hz   Right ear:            Left ear:               Visual Acuity Screening    Right eye Left eye Both eyes   Without correction: 20/30 20/40    With correction:        XR ORBITS MIN 4 VIEWS    Result Date: 1/25/2022  EXAMINATION: XR ORBITS MIN 4 VIEWS CLINICAL HISTORY: R eye trauma;  Ocular pain, right eye TECHNIQUE: Four views of the face and orbits. COMPARISON: None FINDINGS: No acute fractures are detected.  Orbits appear intact.  Nasal septum is midline.  Ovoid density at the base of the right maxillary sinus could represent a mucous retention cyst.  No significant paranasal sinus disease elsewhere.  No air-fluid levels.. No osseous destruction.     1. No acute radiographic abnormality 2. Probable mild right maxillary sinus disease. Electronically signed by: Darrell Harden Date:    01/25/2022 Time:    17:21      Assessment:       1. Conjunctivitis of right eye, unspecified conjunctivitis type    2. Pain of right eye          Plan:         Conjunctivitis of right eye,  unspecified conjunctivitis type  -     ciprofloxacin HCl (CILOXAN) 0.3 % ophthalmic solution; Place 2 drops into the right eye 4 (four) times daily. for 7 days  Dispense: 5 mL; Refill: 0    Pain of right eye  -     XR ORBITS MIN 4 VIEWS; Future; Expected date: 01/25/2022  -     Ambulatory referral/consult to Ophthalmology    -possible uveitis as evidenced by orbital pain, photophobia, eye injection.  Instructed to continue tobramycin/dexa gtts and f/u with Ophthalmology within 24 hours             Patient Instructions   Patient Education       Conjunctivitis ([place]) Discharge Instructions   About this topic   The medical name for pink eye is conjunctivitis. Your eye is irritated or infected. It is red and irritated. Your eye may also itch, burn, or be sensitive to light. If an infection is causing your pink eye, it is easy to spread from person to person.  Infections are often caused by viruses and antibiotics wont help. Most of the time, pink eye will go away on its own without treatment after a few days.  If the doctor thinks you have a bacterial infection in your eye, you will need antibiotics to treat the infection. It is important to take all of your antibiotics even if your eye starts to feel better.       What care is needed at home?   · Ask your doctor what you need to do when you go home. Make sure you ask questions if you do not understand what the doctor says.  · Wash your hands before touching your eyes. Gently remove your eye drainage or crusting with a clean cloth and warm water.  · Avoid pollen if an allergy is causing your pink eye. Stay inside as much as you can with the windows closed during peak allergy seasons.  · Lubricating eye drops or allergy eye drops may help your eye feel better. Make sure to read the directions carefully. Wash your hands with care before and after you touch your eye.  · When you use eye drops, take care not to touch the bottle or dropper to your eye.  · If you wear  contact lenses, you will need to stop wearing them for a short time until your symptoms go away. If your contacts are disposable, start with fresh ones after your eye gets better. If not, clean your contacts with care. Clean your contact case thoroughly or get a new one.  · Avoid sharing towels, washcloths, bedding, or personal items when you have pink eye.  · You may need to stay out of work or school for a few days.  What follow-up care is needed?   Your doctor may ask you to make visits to the office to check on your progress. Be sure to keep these visits.  What drugs may be needed?   The doctor may order drugs based on the cause of your health problem. Talk to your doctor about what drugs you need to take.   Will physical activity be limited?   Your physical activities will not be limited. Remember, this infection spreads easily from person to person. Ask your doctor when you can go back to work or school.  What problems could happen?   You may be infected again from someone in your house, workplace, or school.  What can be done to prevent this health problem?   Good hygiene can help prevent the spread of this infection.  · Wash your hands well and often, after touching your face, and after you cough or sneeze.  · Do not share eye make-up or eye drops with others.  · Do not share pillows or towels with others.  · Clean contact lenses daily. Do not sleep in them unless your eye doctor says it is OK.  When do I need to call the doctor?   · You have trouble seeing clearly after blinking.  · Your eye is still red or has drainage after 3 days.  · You have eye pain that is getting worse.  Teach Back: Helping You Understand   The Teach Back Method helps you understand the information we are giving you. After you talk with the staff, tell them in your own words what you learned. This helps to make sure the staff has described each thing clearly. It also helps to explain things that may have been confusing. Before going  home, make sure you can do these:  · I can tell you about my condition.  · I can tell you how to care for my eye.  · I can tell you what I will do if I have eye pain or blurry eyesight.  Where can I learn more?   [facility].org  http://familydoctor.org/familydoctor/en/diseases-conditions/allergic-conjunctivitis.html   [facility]  http://kidshealth.org/en/parents/conjunctivitis.html?ref=search&WT.ac=lpe-h-xryy-qh-iaviqd-mym   [facility] Choices  https://www.nhs.uk/conditions/conjunctivitis/   Last Reviewed Date   2021-06-10  Consumer Information Use and Disclaimer   This information is not specific medical advice and does not replace information you receive from your health care provider. This is only a brief summary of general information. It does NOT include all information about conditions, illnesses, injuries, tests, procedures, treatments, therapies, discharge instructions or life-style choices that may apply to you. You must talk with your health care provider for complete information about your health and treatment options. This information should not be used to decide whether or not to accept your health care providers advice, instructions or recommendations. Only your health care provider has the knowledge and training to provide advice that is right for you.  Copyright   Copyright © 2021 [employer] and its affiliates and/or licensors. All rights reserved.    If worsening or no improvement in 24 hrs got to ER for further evaluation.   Advised on return/follow-up precautions. Advised on ER precautions. Answered all patient questions. Patient verbalized understanding and voiced agreement with current treatment plan.

## 2022-01-27 ENCOUNTER — OFFICE VISIT (OUTPATIENT)
Dept: OPTOMETRY | Facility: CLINIC | Age: 77
End: 2022-01-27
Payer: COMMERCIAL

## 2022-01-27 DIAGNOSIS — L23.9 ALLERGIC CONTACT DERMATITIS, UNSPECIFIED TRIGGER: Primary | ICD-10-CM

## 2022-01-27 PROCEDURE — 1159F PR MEDICATION LIST DOCUMENTED IN MEDICAL RECORD: ICD-10-PCS | Mod: CPTII,S$GLB,, | Performed by: OPTOMETRIST

## 2022-01-27 PROCEDURE — 92014 COMPRE OPH EXAM EST PT 1/>: CPT | Mod: S$GLB,,, | Performed by: OPTOMETRIST

## 2022-01-27 PROCEDURE — 1125F PR PAIN SEVERITY QUANTIFIED, PAIN PRESENT: ICD-10-PCS | Mod: CPTII,S$GLB,, | Performed by: OPTOMETRIST

## 2022-01-27 PROCEDURE — 92014 PR EYE EXAM, EST PATIENT,COMPREHESV: ICD-10-PCS | Mod: S$GLB,,, | Performed by: OPTOMETRIST

## 2022-01-27 PROCEDURE — 99999 PR PBB SHADOW E&M-EST. PATIENT-LVL II: CPT | Mod: PBBFAC,,, | Performed by: OPTOMETRIST

## 2022-01-27 PROCEDURE — 1125F AMNT PAIN NOTED PAIN PRSNT: CPT | Mod: CPTII,S$GLB,, | Performed by: OPTOMETRIST

## 2022-01-27 PROCEDURE — 99999 PR PBB SHADOW E&M-EST. PATIENT-LVL II: ICD-10-PCS | Mod: PBBFAC,,, | Performed by: OPTOMETRIST

## 2022-01-27 PROCEDURE — 1159F MED LIST DOCD IN RCRD: CPT | Mod: CPTII,S$GLB,, | Performed by: OPTOMETRIST

## 2022-01-27 NOTE — PROGRESS NOTES
HPI     Pt had injury and given Tobradex QID for Traumatic Iritis  Was feeling better in 1-2 days after starting meds  2 days ago red painful eye with puffiness   Saw Urgent care   Had done a 'bead bath w/ coconut something' the night before       Last edited by Sukhi Seo, OD on 1/27/2022  2:17 PM. (History)            Assessment /Plan     For exam results, see Encounter Report.    Allergic contact dermatitis, unspecified trigger  -Pt's eye stable and no signs of infection or irritation  -Cont'd Tobrax x 1 wk    RTC PRN

## 2022-02-02 ENCOUNTER — TELEPHONE (OUTPATIENT)
Dept: ENDOSCOPY | Facility: HOSPITAL | Age: 77
End: 2022-02-02
Payer: COMMERCIAL

## 2022-02-15 ENCOUNTER — TELEPHONE (OUTPATIENT)
Dept: ENDOSCOPY | Facility: HOSPITAL | Age: 77
End: 2022-02-15
Payer: COMMERCIAL

## 2022-04-06 NOTE — PROGRESS NOTES
Patient's chart was reviewed for overdue MIGUEL ANGEL topics.  Immunizations LINKS not responding.     lyrica dose increased.

## 2022-04-22 ENCOUNTER — OFFICE VISIT (OUTPATIENT)
Dept: INTERNAL MEDICINE | Facility: CLINIC | Age: 77
End: 2022-04-22
Payer: COMMERCIAL

## 2022-04-22 VITALS
WEIGHT: 151 LBS | HEIGHT: 65 IN | HEART RATE: 74 BPM | DIASTOLIC BLOOD PRESSURE: 70 MMHG | RESPIRATION RATE: 18 BRPM | SYSTOLIC BLOOD PRESSURE: 104 MMHG | BODY MASS INDEX: 25.16 KG/M2 | OXYGEN SATURATION: 98 %

## 2022-04-22 DIAGNOSIS — N32.81 OVERACTIVE BLADDER: ICD-10-CM

## 2022-04-22 DIAGNOSIS — R35.0 URINARY FREQUENCY: Primary | ICD-10-CM

## 2022-04-22 DIAGNOSIS — N89.8 VAGINAL IRRITATION: ICD-10-CM

## 2022-04-22 LAB
BACTERIA #/AREA URNS AUTO: ABNORMAL /HPF
BILIRUB UR QL STRIP: NEGATIVE
CLARITY UR REFRACT.AUTO: ABNORMAL
COLOR UR AUTO: YELLOW
GLUCOSE UR QL STRIP: NEGATIVE
HGB UR QL STRIP: NEGATIVE
KETONES UR QL STRIP: NEGATIVE
LEUKOCYTE ESTERASE UR QL STRIP: ABNORMAL
MICROSCOPIC COMMENT: ABNORMAL
NITRITE UR QL STRIP: NEGATIVE
PH UR STRIP: 5 [PH] (ref 5–8)
PROT UR QL STRIP: NEGATIVE
RBC #/AREA URNS AUTO: 1 /HPF (ref 0–4)
SP GR UR STRIP: 1.02 (ref 1–1.03)
SQUAMOUS #/AREA URNS AUTO: 9 /HPF
URN SPEC COLLECT METH UR: ABNORMAL
WBC #/AREA URNS AUTO: 6 /HPF (ref 0–5)

## 2022-04-22 PROCEDURE — 3078F DIAST BP <80 MM HG: CPT | Mod: CPTII,S$GLB,, | Performed by: INTERNAL MEDICINE

## 2022-04-22 PROCEDURE — 1125F AMNT PAIN NOTED PAIN PRSNT: CPT | Mod: CPTII,S$GLB,, | Performed by: INTERNAL MEDICINE

## 2022-04-22 PROCEDURE — 1125F PR PAIN SEVERITY QUANTIFIED, PAIN PRESENT: ICD-10-PCS | Mod: CPTII,S$GLB,, | Performed by: INTERNAL MEDICINE

## 2022-04-22 PROCEDURE — 3074F PR MOST RECENT SYSTOLIC BLOOD PRESSURE < 130 MM HG: ICD-10-PCS | Mod: CPTII,S$GLB,, | Performed by: INTERNAL MEDICINE

## 2022-04-22 PROCEDURE — 99999 PR PBB SHADOW E&M-EST. PATIENT-LVL IV: ICD-10-PCS | Mod: PBBFAC,,, | Performed by: INTERNAL MEDICINE

## 2022-04-22 PROCEDURE — 1159F MED LIST DOCD IN RCRD: CPT | Mod: CPTII,S$GLB,, | Performed by: INTERNAL MEDICINE

## 2022-04-22 PROCEDURE — 81001 URINALYSIS AUTO W/SCOPE: CPT | Performed by: INTERNAL MEDICINE

## 2022-04-22 PROCEDURE — 3074F SYST BP LT 130 MM HG: CPT | Mod: CPTII,S$GLB,, | Performed by: INTERNAL MEDICINE

## 2022-04-22 PROCEDURE — 3078F PR MOST RECENT DIASTOLIC BLOOD PRESSURE < 80 MM HG: ICD-10-PCS | Mod: CPTII,S$GLB,, | Performed by: INTERNAL MEDICINE

## 2022-04-22 PROCEDURE — 1101F PR PT FALLS ASSESS DOC 0-1 FALLS W/OUT INJ PAST YR: ICD-10-PCS | Mod: CPTII,S$GLB,, | Performed by: INTERNAL MEDICINE

## 2022-04-22 PROCEDURE — 1101F PT FALLS ASSESS-DOCD LE1/YR: CPT | Mod: CPTII,S$GLB,, | Performed by: INTERNAL MEDICINE

## 2022-04-22 PROCEDURE — 1159F PR MEDICATION LIST DOCUMENTED IN MEDICAL RECORD: ICD-10-PCS | Mod: CPTII,S$GLB,, | Performed by: INTERNAL MEDICINE

## 2022-04-22 PROCEDURE — 3288F FALL RISK ASSESSMENT DOCD: CPT | Mod: CPTII,S$GLB,, | Performed by: INTERNAL MEDICINE

## 2022-04-22 PROCEDURE — 3288F PR FALLS RISK ASSESSMENT DOCUMENTED: ICD-10-PCS | Mod: CPTII,S$GLB,, | Performed by: INTERNAL MEDICINE

## 2022-04-22 PROCEDURE — 99213 PR OFFICE/OUTPT VISIT, EST, LEVL III, 20-29 MIN: ICD-10-PCS | Mod: S$GLB,,, | Performed by: INTERNAL MEDICINE

## 2022-04-22 PROCEDURE — 99999 PR PBB SHADOW E&M-EST. PATIENT-LVL IV: CPT | Mod: PBBFAC,,, | Performed by: INTERNAL MEDICINE

## 2022-04-22 PROCEDURE — 99213 OFFICE O/P EST LOW 20 MIN: CPT | Mod: S$GLB,,, | Performed by: INTERNAL MEDICINE

## 2022-04-22 RX ORDER — OXYBUTYNIN CHLORIDE 5 MG/1
5 TABLET, EXTENDED RELEASE ORAL DAILY
Qty: 30 TABLET | Refills: 11 | Status: SHIPPED | OUTPATIENT
Start: 2022-04-22 | End: 2023-04-22

## 2022-04-22 NOTE — PROGRESS NOTES
Subjective:       Patient ID: Kj Chávez is a 76 y.o. female.    Chief Complaint: Cystitis (Patient states that she has pain in abdominal area and the she has irritation and itching discomfort when using the restroom.)      HPI  Kj Chávez is a 76 y.o. year old female presents for urgent care evaluation of on going increased urinary frequency, vaginal itching / discomfort. Denies any blood in urine. Urine is yellow, darker if drinking tea. Denies dysuria, foul smell, cloudiness, hematuria. Feels that she needs to urinate every half hour. If she doesn't drink anything after 8 pm, she does not have to urinate.     Review of Systems   Constitutional: Negative for activity change, appetite change, fatigue, fever and unexpected weight change.   HENT: Negative for congestion, hearing loss, postnasal drip, sneezing, sore throat, trouble swallowing and voice change.    Eyes: Negative for pain and discharge.   Respiratory: Negative for cough, choking, chest tightness, shortness of breath and wheezing.    Cardiovascular: Negative for chest pain, palpitations and leg swelling.   Gastrointestinal: Negative for abdominal distention, abdominal pain, blood in stool, constipation, diarrhea, nausea and vomiting.   Endocrine: Negative for polydipsia and polyuria.   Genitourinary: Positive for dysuria and urgency. Negative for difficulty urinating and flank pain.   Musculoskeletal: Negative for arthralgias, back pain, joint swelling, myalgias and neck pain.   Skin: Negative for rash.   Neurological: Negative for dizziness, tremors, seizures, weakness, numbness and headaches.   Psychiatric/Behavioral: Negative for agitation. The patient is not nervous/anxious.          Past Medical History:   Diagnosis Date    Allergy     Cataract     Colon polyp     Gastric nodule     GERD (gastroesophageal reflux disease)     Hypertension     mild, resolved     Osteoporosis         Prior to Admission medications    Medication  "Sig Start Date End Date Taking? Authorizing Provider   BIOTIN ORAL Take by mouth once daily.   Yes Historical Provider   calcium citrate-vitamin D3 315-200 mg (CITRACAL+D) 315-200 mg-unit per tablet Take 1 tablet by mouth 2 (two) times daily.   Yes Historical Provider   cetirizine (ZYRTEC) 10 MG tablet Take 10 mg by mouth once daily.   Yes Historical Provider   fish oil-omega-3 fatty acids 300-1,000 mg capsule Take 2 g by mouth once daily.   Yes Historical Provider   fluticasone (FLONASE) 50 mcg/actuation nasal spray 2 sprays by Each Nare route once daily. 4/17/14  Yes Catherine Barrett MD   ibuprofen (ADVIL,MOTRIN) 800 MG tablet Take 1 tablet (800 mg total) by mouth every 6 (six) hours as needed for Pain. 1/5/21  Yes Maile Hilton MD   loratadine-pseudoephedrine 5-120 mg (CLARITIN-D 12-HOUR) 5-120 mg per tablet Take 1 tablet by mouth 2 (two) times daily.   Yes Historical Provider        Past medical history, surgical history, and family medical history reviewed and updated as appropriate.    Medications and allergies reviewed.     Objective:          Vitals:    04/22/22 1330   BP: 104/70   BP Location: Right arm   Patient Position: Sitting   BP Method: Large (Manual)   Pulse: 74   Resp: 18   SpO2: 98%   Weight: 68.5 kg (151 lb 0.2 oz)   Height: 5' 5" (1.651 m)     Body mass index is 25.13 kg/m².  Physical Exam  Constitutional:       Appearance: She is well-developed.   HENT:      Head: Normocephalic and atraumatic.   Eyes:      Pupils: Pupils are equal, round, and reactive to light.   Cardiovascular:      Rate and Rhythm: Normal rate and regular rhythm.      Heart sounds: Normal heart sounds.   Pulmonary:      Effort: Pulmonary effort is normal. No respiratory distress.      Breath sounds: Normal breath sounds. No wheezing.   Abdominal:      General: Bowel sounds are normal. There is no distension.      Palpations: Abdomen is soft.      Tenderness: There is no abdominal tenderness.   Musculoskeletal:    "      General: No tenderness. Normal range of motion.      Cervical back: Normal range of motion.   Skin:     General: Skin is warm and dry.   Neurological:      Mental Status: She is alert and oriented to person, place, and time.      Cranial Nerves: No cranial nerve deficit.      Deep Tendon Reflexes: Reflexes are normal and symmetric.         Lab Results   Component Value Date    WBC 3.94 07/15/2021    HGB 12.3 07/15/2021    HCT 42.1 07/15/2021     07/15/2021    CHOL 221 (H) 07/15/2021    TRIG 87 07/15/2021    HDL 59 07/15/2021    ALT 11 07/15/2021    AST 13 07/15/2021     07/28/2021    K 4.6 07/28/2021     07/28/2021    CREATININE 0.8 07/28/2021    BUN 9 07/28/2021    CO2 25 07/28/2021    TSH 2.561 07/28/2021    INR 1.0 04/14/2005    HGBA1C 5.7 (H) 03/07/2019       Assessment:       1. Urinary frequency    2. Overactive bladder    3. Vaginal irritation          Plan:     Kj was seen today for cystitis.    Diagnoses and all orders for this visit:    Urinary frequency  Comments:  obtain urinalysis and urine culture; known hx of overactive bladder, represcribe oxybutin and have pt follow up with urology.   Orders:  -     Urinalysis, Reflex to Urine Culture Urine, Clean Catch  -     oxybutynin (DITROPAN-XL) 5 MG TR24; Take 1 tablet (5 mg total) by mouth once daily.    Overactive bladder    Vaginal irritation  Comments:  needs f/u with ob/gyn; known history of vaginal dryness; previously on estrace cream.     Other orders  -     Urinalysis Microscopic        Follow up in about 3 months (around 7/22/2022).    Ezequiel Pastrana MD  Internal Medicine / Primary Care  [facility]  4/22/2022

## 2022-04-22 NOTE — PATIENT INSTRUCTIONS
"Urinalysis and urine culture today    Schedule ob/gyn appointment  Schedule urology appointment    " You are referred by Dr Christian Medina for an EUS (endoscopic ultrasound) to evaluate a  nodule in your stomach.  Please let me know if you would like to schedule by replying to message or calling me at [phone]   --Shalini  "    Call this number to schedule your EUS  [phone]    Return to clinic in 3 months or sooner if needed.    "

## 2022-07-21 ENCOUNTER — TELEPHONE (OUTPATIENT)
Dept: INTERNAL MEDICINE | Facility: CLINIC | Age: 77
End: 2022-07-21
Payer: COMMERCIAL

## 2022-07-21 DIAGNOSIS — Z12.31 BREAST CANCER SCREENING BY MAMMOGRAM: Primary | ICD-10-CM

## 2022-07-21 NOTE — TELEPHONE ENCOUNTER
Notified pt her request for Mammo has been sent to the provider.  Pt can be scheduled anytime now because she is 1yr+ 5 days past her last mammo. ( 1y+1day required)

## 2022-07-21 NOTE — TELEPHONE ENCOUNTER
----- Message from Abby Self sent at 7/21/2022  4:50 PM CDT -----  Kj Chávez calling regarding Orders (message) for annual mammogram.  call back [phone]

## 2022-07-25 ENCOUNTER — LAB VISIT (OUTPATIENT)
Dept: LAB | Facility: HOSPITAL | Age: 77
End: 2022-07-25
Attending: INTERNAL MEDICINE
Payer: COMMERCIAL

## 2022-07-25 ENCOUNTER — OFFICE VISIT (OUTPATIENT)
Dept: INTERNAL MEDICINE | Facility: CLINIC | Age: 77
End: 2022-07-25
Payer: COMMERCIAL

## 2022-07-25 VITALS
DIASTOLIC BLOOD PRESSURE: 78 MMHG | HEIGHT: 66 IN | HEART RATE: 70 BPM | BODY MASS INDEX: 25.22 KG/M2 | SYSTOLIC BLOOD PRESSURE: 140 MMHG | RESPIRATION RATE: 16 BRPM | WEIGHT: 156.94 LBS | OXYGEN SATURATION: 96 %

## 2022-07-25 DIAGNOSIS — I70.0 AORTIC ATHEROSCLEROSIS: ICD-10-CM

## 2022-07-25 DIAGNOSIS — M81.0 AGE-RELATED OSTEOPOROSIS WITHOUT CURRENT PATHOLOGICAL FRACTURE: ICD-10-CM

## 2022-07-25 DIAGNOSIS — Z86.010 HISTORY OF COLON POLYPS: ICD-10-CM

## 2022-07-25 DIAGNOSIS — R51.9 SINUS HEADACHE: ICD-10-CM

## 2022-07-25 DIAGNOSIS — N28.1 RENAL CYSTS, ACQUIRED, BILATERAL: ICD-10-CM

## 2022-07-25 DIAGNOSIS — D56.3 ALPHA THALASSEMIA TRAIT: ICD-10-CM

## 2022-07-25 DIAGNOSIS — E21.3 HYPERPARATHYROIDISM, UNSPECIFIED: ICD-10-CM

## 2022-07-25 DIAGNOSIS — Z76.89 ENCOUNTER TO ESTABLISH CARE: Primary | ICD-10-CM

## 2022-07-25 DIAGNOSIS — R03.0 ELEVATED BLOOD PRESSURE READING: ICD-10-CM

## 2022-07-25 DIAGNOSIS — Z86.018 HISTORY OF BENIGN GASTRIC TUMOR: ICD-10-CM

## 2022-07-25 LAB
25(OH)D3+25(OH)D2 SERPL-MCNC: 106 NG/ML (ref 30–96)
ALBUMIN SERPL BCP-MCNC: 4.3 G/DL (ref 3.5–5.2)
ALP SERPL-CCNC: 104 U/L (ref 55–135)
ALT SERPL W/O P-5'-P-CCNC: 11 U/L (ref 10–44)
ANION GAP SERPL CALC-SCNC: 8 MMOL/L (ref 8–16)
AST SERPL-CCNC: 14 U/L (ref 10–40)
BASOPHILS # BLD AUTO: 0.06 K/UL (ref 0–0.2)
BASOPHILS NFR BLD: 1.5 % (ref 0–1.9)
BILIRUB SERPL-MCNC: 0.6 MG/DL (ref 0.1–1)
BUN SERPL-MCNC: 10 MG/DL (ref 8–23)
CALCIUM SERPL-MCNC: 10.1 MG/DL (ref 8.7–10.5)
CHLORIDE SERPL-SCNC: 101 MMOL/L (ref 95–110)
CHOLEST SERPL-MCNC: 253 MG/DL (ref 120–199)
CHOLEST/HDLC SERPL: 3.8 {RATIO} (ref 2–5)
CO2 SERPL-SCNC: 29 MMOL/L (ref 23–29)
CREAT SERPL-MCNC: 0.7 MG/DL (ref 0.5–1.4)
DIFFERENTIAL METHOD: ABNORMAL
EOSINOPHIL # BLD AUTO: 0.1 K/UL (ref 0–0.5)
EOSINOPHIL NFR BLD: 3 % (ref 0–8)
ERYTHROCYTE [DISTWIDTH] IN BLOOD BY AUTOMATED COUNT: 15 % (ref 11.5–14.5)
EST. GFR  (AFRICAN AMERICAN): >60 ML/MIN/1.73 M^2
EST. GFR  (NON AFRICAN AMERICAN): >60 ML/MIN/1.73 M^2
ESTIMATED AVG GLUCOSE: 117 MG/DL (ref 68–131)
GLUCOSE SERPL-MCNC: 94 MG/DL (ref 70–110)
HBA1C MFR BLD: 5.7 % (ref 4–5.6)
HCT VFR BLD AUTO: 41 % (ref 37–48.5)
HDLC SERPL-MCNC: 67 MG/DL (ref 40–75)
HDLC SERPL: 26.5 % (ref 20–50)
HGB BLD-MCNC: 12.3 G/DL (ref 12–16)
IMM GRANULOCYTES # BLD AUTO: 0.01 K/UL (ref 0–0.04)
IMM GRANULOCYTES NFR BLD AUTO: 0.2 % (ref 0–0.5)
LDLC SERPL CALC-MCNC: 167.6 MG/DL (ref 63–159)
LYMPHOCYTES # BLD AUTO: 2.2 K/UL (ref 1–4.8)
LYMPHOCYTES NFR BLD: 54.2 % (ref 18–48)
MCH RBC QN AUTO: 22.2 PG (ref 27–31)
MCHC RBC AUTO-ENTMCNC: 30 G/DL (ref 32–36)
MCV RBC AUTO: 74 FL (ref 82–98)
MONOCYTES # BLD AUTO: 0.4 K/UL (ref 0.3–1)
MONOCYTES NFR BLD: 9.9 % (ref 4–15)
NEUTROPHILS # BLD AUTO: 1.3 K/UL (ref 1.8–7.7)
NEUTROPHILS NFR BLD: 31.2 % (ref 38–73)
NONHDLC SERPL-MCNC: 186 MG/DL
NRBC BLD-RTO: 0 /100 WBC
PLATELET # BLD AUTO: 221 K/UL (ref 150–450)
PMV BLD AUTO: 12.5 FL (ref 9.2–12.9)
POTASSIUM SERPL-SCNC: 4.4 MMOL/L (ref 3.5–5.1)
PROT SERPL-MCNC: 7.1 G/DL (ref 6–8.4)
PTH-INTACT SERPL-MCNC: 117.4 PG/ML (ref 9–77)
RBC # BLD AUTO: 5.55 M/UL (ref 4–5.4)
SODIUM SERPL-SCNC: 138 MMOL/L (ref 136–145)
TRIGL SERPL-MCNC: 92 MG/DL (ref 30–150)
TSH SERPL DL<=0.005 MIU/L-ACNC: 1.41 UIU/ML (ref 0.4–4)
WBC # BLD AUTO: 4.04 K/UL (ref 3.9–12.7)

## 2022-07-25 PROCEDURE — 1101F PT FALLS ASSESS-DOCD LE1/YR: CPT | Mod: CPTII,S$GLB,, | Performed by: INTERNAL MEDICINE

## 2022-07-25 PROCEDURE — 83970 ASSAY OF PARATHORMONE: CPT | Performed by: INTERNAL MEDICINE

## 2022-07-25 PROCEDURE — 1101F PR PT FALLS ASSESS DOC 0-1 FALLS W/OUT INJ PAST YR: ICD-10-PCS | Mod: CPTII,S$GLB,, | Performed by: INTERNAL MEDICINE

## 2022-07-25 PROCEDURE — 1159F MED LIST DOCD IN RCRD: CPT | Mod: CPTII,S$GLB,, | Performed by: INTERNAL MEDICINE

## 2022-07-25 PROCEDURE — 1159F PR MEDICATION LIST DOCUMENTED IN MEDICAL RECORD: ICD-10-PCS | Mod: CPTII,S$GLB,, | Performed by: INTERNAL MEDICINE

## 2022-07-25 PROCEDURE — 84443 ASSAY THYROID STIM HORMONE: CPT | Performed by: INTERNAL MEDICINE

## 2022-07-25 PROCEDURE — 3288F FALL RISK ASSESSMENT DOCD: CPT | Mod: CPTII,S$GLB,, | Performed by: INTERNAL MEDICINE

## 2022-07-25 PROCEDURE — 80053 COMPREHEN METABOLIC PANEL: CPT | Performed by: INTERNAL MEDICINE

## 2022-07-25 PROCEDURE — 3288F PR FALLS RISK ASSESSMENT DOCUMENTED: ICD-10-PCS | Mod: CPTII,S$GLB,, | Performed by: INTERNAL MEDICINE

## 2022-07-25 PROCEDURE — 3044F PR MOST RECENT HEMOGLOBIN A1C LEVEL <7.0%: ICD-10-PCS | Mod: CPTII,S$GLB,, | Performed by: INTERNAL MEDICINE

## 2022-07-25 PROCEDURE — 3077F PR MOST RECENT SYSTOLIC BLOOD PRESSURE >= 140 MM HG: ICD-10-PCS | Mod: CPTII,S$GLB,, | Performed by: INTERNAL MEDICINE

## 2022-07-25 PROCEDURE — 3077F SYST BP >= 140 MM HG: CPT | Mod: CPTII,S$GLB,, | Performed by: INTERNAL MEDICINE

## 2022-07-25 PROCEDURE — 82306 VITAMIN D 25 HYDROXY: CPT | Performed by: INTERNAL MEDICINE

## 2022-07-25 PROCEDURE — 99999 PR PBB SHADOW E&M-EST. PATIENT-LVL V: CPT | Mod: PBBFAC,,, | Performed by: INTERNAL MEDICINE

## 2022-07-25 PROCEDURE — 3078F PR MOST RECENT DIASTOLIC BLOOD PRESSURE < 80 MM HG: ICD-10-PCS | Mod: CPTII,S$GLB,, | Performed by: INTERNAL MEDICINE

## 2022-07-25 PROCEDURE — 99214 PR OFFICE/OUTPT VISIT, EST, LEVL IV, 30-39 MIN: ICD-10-PCS | Mod: S$GLB,,, | Performed by: INTERNAL MEDICINE

## 2022-07-25 PROCEDURE — 3044F HG A1C LEVEL LT 7.0%: CPT | Mod: CPTII,S$GLB,, | Performed by: INTERNAL MEDICINE

## 2022-07-25 PROCEDURE — 3078F DIAST BP <80 MM HG: CPT | Mod: CPTII,S$GLB,, | Performed by: INTERNAL MEDICINE

## 2022-07-25 PROCEDURE — 99214 OFFICE O/P EST MOD 30 MIN: CPT | Mod: S$GLB,,, | Performed by: INTERNAL MEDICINE

## 2022-07-25 PROCEDURE — 85025 COMPLETE CBC W/AUTO DIFF WBC: CPT | Performed by: INTERNAL MEDICINE

## 2022-07-25 PROCEDURE — 1126F PR PAIN SEVERITY QUANTIFIED, NO PAIN PRESENT: ICD-10-PCS | Mod: CPTII,S$GLB,, | Performed by: INTERNAL MEDICINE

## 2022-07-25 PROCEDURE — 99999 PR PBB SHADOW E&M-EST. PATIENT-LVL V: ICD-10-PCS | Mod: PBBFAC,,, | Performed by: INTERNAL MEDICINE

## 2022-07-25 PROCEDURE — 83036 HEMOGLOBIN GLYCOSYLATED A1C: CPT | Performed by: INTERNAL MEDICINE

## 2022-07-25 PROCEDURE — 80061 LIPID PANEL: CPT | Performed by: INTERNAL MEDICINE

## 2022-07-25 PROCEDURE — 36415 COLL VENOUS BLD VENIPUNCTURE: CPT | Performed by: INTERNAL MEDICINE

## 2022-07-25 PROCEDURE — 1126F AMNT PAIN NOTED NONE PRSNT: CPT | Mod: CPTII,S$GLB,, | Performed by: INTERNAL MEDICINE

## 2022-07-25 NOTE — PATIENT INSTRUCTIONS
Fasting labwork today  Schedule ultrasound of soft tissue of neck (check thyroid, parathyroid glands)  Schedule follow up with endocrinology    Blood pressure logs for 1 week - check blood pressure twice a day, write it down, write down your food, write down sleep, write down your stress.  Nurse visit in 1 week for blood pressure check    Return to clinic in 5 months or sooner if needed.

## 2022-07-25 NOTE — PROGRESS NOTES
Subjective:       Patient ID: Kj Chávez is a 76 y.o. female.    Chief Complaint: Follow-up      HPI  Kj Chávez is a 76 y.o. year old female with alpha thalassemia trait, OA, hx benign gastric tumor, osteoporosis presents for establishment of care. Asking to switch providers.     Review of Systems   Constitutional: Negative for activity change, appetite change, fatigue, fever and unexpected weight change.   HENT: Negative for congestion, hearing loss, postnasal drip, sneezing, sore throat, trouble swallowing and voice change.    Eyes: Negative for pain and discharge.   Respiratory: Negative for cough, choking, chest tightness, shortness of breath and wheezing.    Cardiovascular: Negative for chest pain, palpitations and leg swelling.   Gastrointestinal: Negative for abdominal distention, abdominal pain, blood in stool, constipation, diarrhea, nausea and vomiting.   Endocrine: Negative for polydipsia and polyuria.   Genitourinary: Negative for difficulty urinating and flank pain.   Musculoskeletal: Negative for arthralgias, back pain, joint swelling, myalgias and neck pain.   Skin: Negative for rash.   Neurological: Negative for dizziness, tremors, seizures, weakness, numbness and headaches.   Psychiatric/Behavioral: Negative for agitation. The patient is not nervous/anxious.          Past Medical History:   Diagnosis Date    Allergy     Cataract     Colon polyp     Gastric nodule     GERD (gastroesophageal reflux disease)     Hypertension     mild, resolved     Osteoporosis         Prior to Admission medications    Medication Sig Start Date End Date Taking? Authorizing Provider   BIOTIN ORAL Take by mouth once daily.   Yes Historical Provider   calcium citrate-vitamin D3 315-200 mg (CITRACAL+D) 315-200 mg-unit per tablet Take 1 tablet by mouth 2 (two) times daily.   Yes Historical Provider   cetirizine (ZYRTEC) 10 MG tablet Take 10 mg by mouth once daily.   Yes Historical Provider   fish  "oil-omega-3 fatty acids 300-1,000 mg capsule Take 2 g by mouth once daily.   Yes Historical Provider   fluticasone (FLONASE) 50 mcg/actuation nasal spray 2 sprays by Each Nare route once daily. 4/17/14  Yes Catherine Barrett MD   ibuprofen (ADVIL,MOTRIN) 800 MG tablet Take 1 tablet (800 mg total) by mouth every 6 (six) hours as needed for Pain. 1/5/21  Yes Maile Hilton MD   loratadine-pseudoephedrine 5-120 mg (CLARITIN-D 12-HOUR) 5-120 mg per tablet Take 1 tablet by mouth 2 (two) times daily.   Yes Historical Provider   oxybutynin (DITROPAN-XL) 5 MG TR24 Take 1 tablet (5 mg total) by mouth once daily. 4/22/22 4/22/23 Yes Ezequiel Pastrana MD        Past medical history, surgical history, and family medical history reviewed and updated as appropriate.    Medications and allergies reviewed.     Objective:          Vitals:    07/25/22 1415   BP: (!) 140/78   BP Location: Right arm   Patient Position: Sitting   BP Method: Large (Manual)   Pulse: 70   Resp: 16   SpO2: 96%   Weight: 71.2 kg (156 lb 15.5 oz)   Height: 5' 6" (1.676 m)     Body mass index is 25.34 kg/m².  Physical Exam  Constitutional:       Appearance: She is well-developed.   HENT:      Head: Normocephalic and atraumatic.   Eyes:      Extraocular Movements: Extraocular movements intact.   Cardiovascular:      Rate and Rhythm: Normal rate and regular rhythm.      Heart sounds: Normal heart sounds.   Pulmonary:      Effort: Pulmonary effort is normal. No respiratory distress.      Breath sounds: Normal breath sounds. No wheezing.   Abdominal:      General: Bowel sounds are normal. There is no distension.      Palpations: Abdomen is soft.      Tenderness: There is no abdominal tenderness.   Musculoskeletal:         General: No tenderness. Normal range of motion.      Cervical back: Normal range of motion.   Skin:     General: Skin is warm and dry.   Neurological:      Mental Status: She is alert and oriented to person, place, and time.      Cranial " Nerves: No cranial nerve deficit.      Deep Tendon Reflexes: Reflexes are normal and symmetric.         Lab Results   Component Value Date    WBC 4.04 07/25/2022    HGB 12.3 07/25/2022    HCT 41.0 07/25/2022     07/25/2022    CHOL 253 (H) 07/25/2022    TRIG 92 07/25/2022    HDL 67 07/25/2022    ALT 11 07/25/2022    AST 14 07/25/2022     07/25/2022    K 4.4 07/25/2022     07/25/2022    CREATININE 0.7 07/25/2022    BUN 10 07/25/2022    CO2 29 07/25/2022    TSH 1.412 07/25/2022    INR 1.0 04/14/2005    HGBA1C 5.7 (H) 07/25/2022       Assessment:       1. Encounter to establish care    2. Alpha thalassemia trait    3. History of colon polyps    4. Aortic atherosclerosis    5. History of benign gastric tumor: see notes from EGD 2015, benign.  EGD 8/17 stable    6. Age-related osteoporosis without current pathological fracture    7. Renal cysts, acquired, bilateral    8. Hyperparathyroidism, unspecified    9. Sinus headache    10. BMI 25.0-25.9,adult          Plan:     Kj was seen today for follow-up.    Diagnoses and all orders for this visit:    Encounter to establish care    Elevated blood pressure reading  Comments:  bp logs, nurse visit in 1 weeks for bp check    Alpha thalassemia trait  -     CBC Auto Differential; Future    History of colon polyps    Aortic atherosclerosis  -     Lipid Panel; Future    History of benign gastric tumor: see notes from EGD 2015, benign.  EGD 8/17 stable    Age-related osteoporosis without current pathological fracture  Comments:  followed previously with endocrinology, needs to f/u.    Renal cysts, acquired, bilateral    Hyperparathyroidism, unspecified  -     US Soft Tissue Head Neck Thyroid; Future  -     Ambulatory referral/consult to Endocrinology; Future  -     Comprehensive Metabolic Panel; Future  -     TSH; Future  -     PTH, intact; Future  -     Vitamin D; Future    Sinus headache  Comments:  associated with seasonal and environmental allergies    BMI  25.0-25.9,adult  -     Hemoglobin A1C; Future    Benign physical examination, no issues identified. Will obtain routine labwork and age appropriate health screenings.   HLD - Intolerant of lipitor, stopped due to elevated LFTs. Will recheck lipid panel and CMP. Will restart statin if pt is willing.  Elevated blood pressure reading - BP uncontrolled, BP logs    Health maintenance reviewed with patient.    Follow up in about 5 months (around 12/25/2022).    Ezequiel Pastrana MD  Internal Medicine / Primary Care  [facility]  7/25/2022

## 2022-07-26 ENCOUNTER — IMMUNIZATION (OUTPATIENT)
Dept: INTERNAL MEDICINE | Facility: CLINIC | Age: 77
End: 2022-07-26
Payer: COMMERCIAL

## 2022-07-26 DIAGNOSIS — Z23 NEED FOR VACCINATION: Primary | ICD-10-CM

## 2022-07-26 PROCEDURE — 0054A COVID-19, MRNA, LNP-S, PF, 30 MCG/0.3 ML DOSE VACCINE (PFIZER): CPT | Mod: CV19,PBBFAC | Performed by: INTERNAL MEDICINE

## 2022-07-28 ENCOUNTER — HOSPITAL ENCOUNTER (OUTPATIENT)
Dept: RADIOLOGY | Facility: OTHER | Age: 77
Discharge: HOME | End: 2022-07-28
Attending: INTERNAL MEDICINE
Payer: COMMERCIAL

## 2022-07-28 DIAGNOSIS — E21.3 HYPERPARATHYROIDISM, UNSPECIFIED: ICD-10-CM

## 2022-07-28 PROCEDURE — 76536 US EXAM OF HEAD AND NECK: CPT | Mod: TC

## 2022-07-28 PROCEDURE — 76536 US EXAM OF HEAD AND NECK: CPT | Mod: 26,,, | Performed by: RADIOLOGY

## 2022-07-28 PROCEDURE — 76536 US SOFT TISSUE HEAD NECK THYROID: ICD-10-PCS | Mod: 26,,, | Performed by: RADIOLOGY

## 2022-07-29 ENCOUNTER — TELEPHONE (OUTPATIENT)
Dept: INTERNAL MEDICINE | Facility: CLINIC | Age: 77
End: 2022-07-29
Payer: COMMERCIAL

## 2022-07-29 NOTE — TELEPHONE ENCOUNTER
Left message for pt    Messaged pt in portal in regards to results    ----- Message from Ezequiel Pastrana MD sent at 7/28/2022  5:49 PM CDT -----  Ultrasound of your neck was normal.

## 2022-08-03 ENCOUNTER — TELEPHONE (OUTPATIENT)
Dept: INTERNAL MEDICINE | Facility: CLINIC | Age: 77
End: 2022-08-03
Payer: COMMERCIAL

## 2022-08-03 NOTE — TELEPHONE ENCOUNTER
Called patient to relay message from PCP related BP log, no changes to management, no answer, left voicemail message

## 2022-08-18 ENCOUNTER — TELEPHONE (OUTPATIENT)
Dept: INTERNAL MEDICINE | Facility: CLINIC | Age: 77
End: 2022-08-18
Payer: COMMERCIAL

## 2022-08-19 ENCOUNTER — TELEPHONE (OUTPATIENT)
Dept: INTERNAL MEDICINE | Facility: CLINIC | Age: 77
End: 2022-08-19
Payer: COMMERCIAL

## 2022-08-22 ENCOUNTER — TELEPHONE (OUTPATIENT)
Dept: INTERNAL MEDICINE | Facility: CLINIC | Age: 77
End: 2022-08-22
Payer: COMMERCIAL

## 2022-08-22 ENCOUNTER — NURSE TRIAGE (OUTPATIENT)
Dept: ADMINISTRATIVE | Facility: CLINIC | Age: 77
End: 2022-08-22
Payer: COMMERCIAL

## 2022-08-22 DIAGNOSIS — U07.1 COVID-19 VIRUS INFECTION: Primary | ICD-10-CM

## 2022-08-22 DIAGNOSIS — U07.1 COVID-19: Primary | ICD-10-CM

## 2022-08-22 NOTE — TELEPHONE ENCOUNTER
----- Message from Clarisse Chávez sent at 8/22/2022  2:02 PM CDT -----  Contact: self [phone]  Patient is returning a phone call.  Who left a message for the patient: Angela Lr MA   Does patient know what this is regarding:    Would you like a call back, or a response through your [facility] portal?:   call back  Comments:      Please call and advise

## 2022-08-22 NOTE — TELEPHONE ENCOUNTER
----- Message from Sri Gardner sent at 8/22/2022  1:35 PM CDT -----  Contact: [phone]  Pt called to speak to the provider. Pt says call is urgent. Did not want to give more info. Please Advise

## 2022-08-22 NOTE — TELEPHONE ENCOUNTER
----- Message from Sri Gardner sent at 8/22/2022  3:35 PM CDT -----  Contact: [phone]  Pt called to speak to the staff about getting some medications. Please Advise

## 2022-08-22 NOTE — TELEPHONE ENCOUNTER
Spoke to patient and notified her that her message regarding medication for COVID and cough has been sent to Dr. Pastrana and will call her back once he instructs us to

## 2022-08-22 NOTE — TELEPHONE ENCOUNTER
Spoke to patient and she wanted to notify you that she has COVID and was diagnosed yesterday and wants to know if you can prescribe her some antibiotics or cough medicine

## 2022-08-22 NOTE — TELEPHONE ENCOUNTER
OOC incoming call - Pt c/o positive for covid. Burning throat, coughing, sneezing runny nose, afebrile. Covid protocol followed and pt advised to speak with a telemedicine doctor. OAC offered and accepted. Pt called back a second time and asked specific htn questions. Pt denies having any chest pain, numbness or tingling at this time. Instructed Pt to go to the ER if she develops any and to tell the telemed doc that her bp is 178/101 hr 81. Pt will follow up and use OAC now. Encounter routed to PCP high priority. Instructed Pt to call ooc at 1 [phone] if she needs anything or has any further questions. Pt agrees.  Reason for Disposition   HIGH RISK for severe COVID complications (e.g., weak immune system, age > 64 years, obesity with BMI > 25, pregnant, chronic lung disease or other chronic medical condition)  (Exception: Already seen by PCP and no new or worsening symptoms.)   Symptom is main concern  (e.g., headache, chest pain)    Additional Information   Negative: SEVERE difficulty breathing (e.g., struggling for each breath, speaks in single words)   Negative: Difficult to awaken or acting confused (e.g., disoriented, slurred speech)   Negative: Bluish (or gray) lips or face now   Negative: Shock suspected (e.g., cold/pale/clammy skin, too weak to stand, low BP, rapid pulse)   Negative: Sounds like a life-threatening emergency to the triager   Negative: SEVERE or constant chest pain or pressure  (Exception: Mild central chest pain, present only when coughing.)   Negative: MODERATE difficulty breathing (e.g., speaks in phrases, SOB even at rest, pulse 100-120)   Negative: [1] Headache AND [2] stiff neck (can't touch chin to chest)   Negative: Oxygen level (e.g., pulse oximetry) 90 percent or lower   Negative: Chest pain or pressure   Negative: Patient sounds very sick or weak to the triager   Negative: MILD difficulty breathing (e.g., minimal/no SOB at rest, SOB with walking, pulse <100)    Negative: Fever > 103 F (39.4 C)   Negative: [1] Fever > 101 F (38.3 C) AND [2] age > 60 years   Negative: [1] Fever > 100.0 F (37.8 C) AND [2] bedridden (e.g., nursing home patient, CVA, chronic illness, recovering from surgery)   Negative: Difficult to awaken or acting confused (e.g., disoriented, slurred speech)   Negative: Severe difficulty breathing (e.g., struggling for each breath, speaks in single words)   Negative: [1] Weakness of the face, arm or leg on one side of the body AND [2] new onset   Negative: [1] Numbness (i.e., loss of sensation) of the face, arm or leg on one side of the body AND [2] new onset   Negative: [1] Chest pain lasts > 5 minutes AND [2] history of heart disease  (i.e., heart attack, bypass surgery, angina, angioplasty, CHF)   Negative: [1] Chest pain AND [2] took nitrogylcerin AND [3] pain was not relieved   Negative: Sounds like a life-threatening emergency to the triager   Negative: [1] Systolic BP  >= 160 OR Diastolic >= 100 AND [2] cardiac or neurologic symptoms (e.g., chest pain, difficulty breathing, unsteady gait, blurred vision)    Protocols used: CORONAVIRUS (COVID-19) DIAGNOSED OR [id number], HIGH BLOOD PRESSURE-A-AH

## 2022-08-23 ENCOUNTER — NURSE TRIAGE (OUTPATIENT)
Dept: ADMINISTRATIVE | Facility: CLINIC | Age: 77
End: 2022-08-23
Payer: COMMERCIAL

## 2022-08-23 ENCOUNTER — TELEPHONE (OUTPATIENT)
Dept: INTERNAL MEDICINE | Facility: CLINIC | Age: 77
End: 2022-08-23
Payer: COMMERCIAL

## 2022-08-23 ENCOUNTER — PATIENT MESSAGE (OUTPATIENT)
Dept: ADMINISTRATIVE | Facility: CLINIC | Age: 77
End: 2022-08-23
Payer: COMMERCIAL

## 2022-08-23 RX ORDER — PROMETHAZINE HYDROCHLORIDE AND DEXTROMETHORPHAN HYDROBROMIDE 6.25; 15 MG/5ML; MG/5ML
5 SYRUP ORAL EVERY 4 HOURS PRN
Qty: 240 ML | Refills: 0 | Status: SHIPPED | OUTPATIENT
Start: 2022-08-23 | End: 2022-09-02

## 2022-08-23 NOTE — TELEPHONE ENCOUNTER
2nd attempt made to contact Pt for enrollment into cc program.    VM left for Pt explaining this was a second attempt made to contact you for enrollment into cc program. If you wish to enroll into program please call ooc at [phone] or check [employer] messages for self enrollment. If having a medical emergency to call 911 or go to the nearest ER. A brief explanation of hsm given and instructed Pt to call ooc to ask to be enrolled into hsm or cc programs or text in [employer].    Encounter routed to pcp.

## 2022-08-23 NOTE — TELEPHONE ENCOUNTER
----- Message from Sri Gardner sent at 8/23/2022  8:15 AM CDT -----  Contact: [phone]  Pt returning missed call. Please Advise

## 2022-08-23 NOTE — TELEPHONE ENCOUNTER
1st attempt made to contact Pt for enrollment into cc program.    Pt cannot remember her [employer] information. Instructed Pt to contact [employer] for a password reset. Invited Pt to call ooc back for help to be enrolled or she can check her [employer] messages once into the [facility]a for self enrollment.    Pt denies needing to be triaged at this time and is not any worse than yesterday. Her  has gone out get the new medication prescribed by her doctor yesterday and she hasen't have a chance to take it yet. Pt denies having any sob at this time. Invited Pt to call ooc if she needs to speak with a nurse for new or worsening symptoms. Alert and oriented, Pt agrees. Pt will take her cough medicine when it becomes available.    [employer] message sent to Pt.      Reason for Disposition   Information only question and nurse able to answer    Protocols used: INFORMATION ONLY CALL - NO TRIAGE-A-OH

## 2022-08-23 NOTE — TELEPHONE ENCOUNTER
Onset of symptoms 4 days ago  covid risk score 2    Called again, spoke to patient    Headache worse when taking claritin D (with pseudophedrine) - discussed not taking this medication.    Promethazine-DM cough syrup called into pharmacy.   Instructed to take mucinex to loosen up mucous  Has not taken tylenol or ibuprofen for headache    Does not wish to take paxlovid    Discussed symptomatic relief, staying hydrated. Will enroll in covid-19 home surveillance.

## 2022-08-24 ENCOUNTER — NURSE TRIAGE (OUTPATIENT)
Dept: ADMINISTRATIVE | Facility: CLINIC | Age: 77
End: 2022-08-24
Payer: COMMERCIAL

## 2022-08-24 NOTE — TELEPHONE ENCOUNTER
Called for pt for covid surveillance enrollment call #3 and no answer sent previous my chart message and to call back if any problems. Pt will be enrolled in [id number] and will remove task  Reason for Disposition   Message left on unidentified voice mail. Phone number verified.    Protocols used: NO CONTACT OR DUPLICATE CONTACT CALL-A-OH

## 2022-09-20 ENCOUNTER — HOSPITAL ENCOUNTER (OUTPATIENT)
Dept: RADIOLOGY | Facility: HOSPITAL | Age: 77
Discharge: HOME | End: 2022-09-20
Attending: INTERNAL MEDICINE
Payer: COMMERCIAL

## 2022-09-20 DIAGNOSIS — Z12.31 BREAST CANCER SCREENING BY MAMMOGRAM: ICD-10-CM

## 2022-09-20 PROCEDURE — 77067 SCR MAMMO BI INCL CAD: CPT | Mod: TC

## 2022-09-20 PROCEDURE — 77067 MAMMO DIGITAL SCREENING BILAT WITH TOMO: ICD-10-PCS | Mod: 26,,, | Performed by: RADIOLOGY

## 2022-09-20 PROCEDURE — 77067 SCR MAMMO BI INCL CAD: CPT | Mod: 26,,, | Performed by: RADIOLOGY

## 2022-09-20 PROCEDURE — 77063 BREAST TOMOSYNTHESIS BI: CPT | Mod: TC

## 2022-09-20 PROCEDURE — 77063 BREAST TOMOSYNTHESIS BI: CPT | Mod: 26,,, | Performed by: RADIOLOGY

## 2022-09-20 PROCEDURE — 77063 MAMMO DIGITAL SCREENING BILAT WITH TOMO: ICD-10-PCS | Mod: 26,,, | Performed by: RADIOLOGY

## 2022-10-30 ENCOUNTER — OFFICE VISIT (OUTPATIENT)
Dept: URGENT CARE | Facility: CLINIC | Age: 77
End: 2022-10-30
Payer: COMMERCIAL

## 2022-10-30 VITALS
WEIGHT: 156 LBS | HEART RATE: 64 BPM | TEMPERATURE: 98 F | HEIGHT: 66 IN | SYSTOLIC BLOOD PRESSURE: 158 MMHG | OXYGEN SATURATION: 99 % | RESPIRATION RATE: 18 BRPM | BODY MASS INDEX: 25.07 KG/M2 | DIASTOLIC BLOOD PRESSURE: 87 MMHG

## 2022-10-30 DIAGNOSIS — M25.562 ACUTE PAIN OF LEFT KNEE: Primary | ICD-10-CM

## 2022-10-30 PROCEDURE — 96372 PR INJECTION,THERAP/PROPH/DIAG2ST, IM OR SUBCUT: ICD-10-PCS | Mod: S$GLB,,,

## 2022-10-30 PROCEDURE — 3044F PR MOST RECENT HEMOGLOBIN A1C LEVEL <7.0%: ICD-10-PCS | Mod: CPTII,S$GLB,,

## 2022-10-30 PROCEDURE — 1125F AMNT PAIN NOTED PAIN PRSNT: CPT | Mod: CPTII,S$GLB,,

## 2022-10-30 PROCEDURE — 99213 PR OFFICE/OUTPT VISIT, EST, LEVL III, 20-29 MIN: ICD-10-PCS | Mod: 25,S$GLB,,

## 2022-10-30 PROCEDURE — 3077F SYST BP >= 140 MM HG: CPT | Mod: CPTII,S$GLB,,

## 2022-10-30 PROCEDURE — 99213 OFFICE O/P EST LOW 20 MIN: CPT | Mod: 25,S$GLB,,

## 2022-10-30 PROCEDURE — 1125F PR PAIN SEVERITY QUANTIFIED, PAIN PRESENT: ICD-10-PCS | Mod: CPTII,S$GLB,,

## 2022-10-30 PROCEDURE — 3077F PR MOST RECENT SYSTOLIC BLOOD PRESSURE >= 140 MM HG: ICD-10-PCS | Mod: CPTII,S$GLB,,

## 2022-10-30 PROCEDURE — 73562 X-RAY EXAM OF KNEE 3: CPT | Mod: LT,S$GLB,, | Performed by: RADIOLOGY

## 2022-10-30 PROCEDURE — 1159F PR MEDICATION LIST DOCUMENTED IN MEDICAL RECORD: ICD-10-PCS | Mod: CPTII,S$GLB,,

## 2022-10-30 PROCEDURE — 3079F DIAST BP 80-89 MM HG: CPT | Mod: CPTII,S$GLB,,

## 2022-10-30 PROCEDURE — 3044F HG A1C LEVEL LT 7.0%: CPT | Mod: CPTII,S$GLB,,

## 2022-10-30 PROCEDURE — 1159F MED LIST DOCD IN RCRD: CPT | Mod: CPTII,S$GLB,,

## 2022-10-30 PROCEDURE — 96372 THER/PROPH/DIAG INJ SC/IM: CPT | Mod: S$GLB,,,

## 2022-10-30 PROCEDURE — 73562 XR KNEE 3 VIEW LEFT: ICD-10-PCS | Mod: LT,S$GLB,, | Performed by: RADIOLOGY

## 2022-10-30 PROCEDURE — 3079F PR MOST RECENT DIASTOLIC BLOOD PRESSURE 80-89 MM HG: ICD-10-PCS | Mod: CPTII,S$GLB,,

## 2022-10-30 PROCEDURE — 1160F RVW MEDS BY RX/DR IN RCRD: CPT | Mod: CPTII,S$GLB,,

## 2022-10-30 PROCEDURE — 1160F PR REVIEW ALL MEDS BY PRESCRIBER/CLIN PHARMACIST DOCUMENTED: ICD-10-PCS | Mod: CPTII,S$GLB,,

## 2022-10-30 RX ORDER — KETOROLAC TROMETHAMINE 30 MG/ML
30 INJECTION, SOLUTION INTRAMUSCULAR; INTRAVENOUS
Status: COMPLETED | OUTPATIENT
Start: 2022-10-30 | End: 2022-10-30

## 2022-10-30 RX ORDER — CYCLOBENZAPRINE HCL 5 MG
5 TABLET ORAL 3 TIMES DAILY PRN
Qty: 30 TABLET | Refills: 0 | Status: SHIPPED | OUTPATIENT
Start: 2022-10-30 | End: 2022-11-09

## 2022-10-30 RX ORDER — MELOXICAM 7.5 MG/1
7.5 TABLET ORAL DAILY
Qty: 10 TABLET | Refills: 0 | Status: SHIPPED | OUTPATIENT
Start: 2022-10-30 | End: 2022-11-09

## 2022-10-30 RX ADMIN — KETOROLAC TROMETHAMINE 30 MG: 30 INJECTION, SOLUTION INTRAMUSCULAR; INTRAVENOUS at 01:10

## 2022-10-30 NOTE — PROGRESS NOTES
"Subjective:       Patient ID: Kj Chávez is a 76 y.o. female.    Vitals:  height is 5' 5.98" (1.676 m) and weight is 70.8 kg (156 lb). Her tympanic temperature is 97.6 °F (36.4 °C). Her blood pressure is 158/87 (abnormal) and her pulse is 64. Her respiration is 18 and oxygen saturation is 99%.     Chief Complaint: Knee Pain    76 y.o female presents today c/o L knee pain that started yesterday when she was going to get something out the fridge. Patient states she had no trauma or injury. She states she turned to walk to the fridge and had sharp knee pain. She denies any popping or clicking. Her knee did not give out. She has used a compression sleeve on the knee, taken advil and applied ice to the knee       Knee Pain   The incident occurred 12 to 24 hours ago. The injury mechanism is unknown. The pain is present in the left knee. The pain is at a severity of 10/10. The pain is severe. Associated symptoms include an inability to bear weight. Pertinent negatives include no loss of motion, loss of sensation, muscle weakness, numbness or tingling. She reports no foreign bodies present. The symptoms are aggravated by movement. Treatments tried: Advil. The treatment provided mild relief.     Neurological:  Negative for numbness.     Objective:      Physical Exam   Constitutional: She is oriented to person, place, and time. She appears well-developed. She is cooperative.  Non-toxic appearance. She does not appear ill. No distress.   HENT:   Head: Normocephalic and atraumatic. Head is without abrasion, without contusion and without laceration.   Ears:   Right Ear: Hearing, tympanic membrane, external ear and ear canal normal. No hemotympanum.   Left Ear: Hearing, tympanic membrane, external ear and ear canal normal. No hemotympanum.   Nose: Nose normal. No mucosal edema, rhinorrhea or nasal deformity. No epistaxis. Right sinus exhibits no maxillary sinus tenderness and no frontal sinus tenderness. Left sinus " exhibits no maxillary sinus tenderness and no frontal sinus tenderness.   Mouth/Throat: Uvula is midline, oropharynx is clear and moist and mucous membranes are normal. No trismus in the jaw. Normal dentition. No uvula swelling. No posterior oropharyngeal erythema.   Eyes: Conjunctivae, EOM and lids are normal. Pupils are equal, round, and reactive to light. Right eye exhibits no discharge. Left eye exhibits no discharge. No scleral icterus.   Neck: Trachea normal and phonation normal. Neck supple. No tracheal deviation present. No neck rigidity present. No spinous process tenderness present. No muscular tenderness present.   Cardiovascular: Normal rate, regular rhythm, normal heart sounds and normal pulses.   Pulmonary/Chest: Effort normal and breath sounds normal. No respiratory distress.   Abdominal: Normal appearance and bowel sounds are normal. She exhibits no distension and no mass. Soft. There is no abdominal tenderness.   Musculoskeletal:         General: No deformity.      Left knee: She exhibits decreased range of motion. She exhibits no swelling, no effusion, no deformity, no LCL laxity, no bony tenderness and no MCL laxity. Tenderness found. Medial joint line and lateral joint line tenderness noted. No MCL, no LCL and no patellar tendon tenderness noted.   Neurological: She is alert and oriented to person, place, and time. She has normal strength. No cranial nerve deficit or sensory deficit. She exhibits normal muscle tone. She displays no seizure activity. Coordination normal. GCS eye subscore is 4. GCS verbal subscore is 5. GCS motor subscore is 6.   Skin: Skin is warm, dry, intact, not diaphoretic and not pale. Capillary refill takes less than 2 seconds. No abrasion, No burn, No bruising and No ecchymosis   Psychiatric: Her speech is normal and behavior is normal. Judgment and thought content normal.   Nursing note and vitals reviewed.      XR KNEE 3 VIEW LEFT    Result Date: 10/30/2022  EXAMINATION:  XR KNEE 3 VIEW LEFT CLINICAL HISTORY: Pain in left knee. TECHNIQUE: AP, lateral, and Merchant views of the left knee were performed. COMPARISON: None. FINDINGS: No acute fracture or dislocation.  There are tricompartmental degenerative changes, more pronounced involving the medial compartment.  Marginal osteophytes in the medial tibial plateau and distal femur.  No sizeable joint effusion.  No unexpected radiopaque foreign body.     Tricompartmental degenerative changes.  No acute fracture. Electronically signed by: Ramirez Cueto MD Date:    10/30/2022 Time:    13:35     Assessment:       1. Acute pain of left knee          Plan:       Patient given toradol injection today. We discussed her xray results which show degenerative changes to the knee. She has no joint laxity. Some medial and lateral joint line tenderness. Patient instructed to alternate ice and heat for pain. Elevate the knee. She was advised to use a knee brace for support. Follow up with orthopedics if symptoms do not improve. Patient would like a muscle relaxer today for pain.   Acute pain of left knee  -     XR KNEE 3 VIEW LEFT; Future; Expected date: 10/30/2022  -     ketorolac injection 30 mg  -     cyclobenzaprine (FLEXERIL) 5 MG tablet; Take 1 tablet (5 mg total) by mouth 3 (three) times daily as needed for Muscle spasms.  Dispense: 30 tablet; Refill: 0  -     meloxicam (MOBIC) 7.5 MG tablet; Take 1 tablet (7.5 mg total) by mouth once daily. for 10 days  Dispense: 10 tablet; Refill: 0

## 2022-10-30 NOTE — LETTER
October 30, 2022      [facility] [address] [address]  Phone: [phone]  Fax: [phone]       Patient: Kj Chávez   YOB: 1945  Date of Visit: 10/30/2022    To Whom It May Concern:    Enrique Chávez  was at [facility] on 10/30/2022. The patient may return to work on 11/2/2022 with no restrictions. If you have any questions or concerns, or if I can be of further assistance, please do not hesitate to contact me.    Sincerely,    Renee Davies PA-C

## 2022-11-17 NOTE — PROGRESS NOTES
Subjective:      Patient ID: Kj Chávez is a 76 y.o. female.    Chief Complaint:  No chief complaint on file.    History of Present Illness  Kj Chávez is here for follow up of osteoporosis and hyperparathyroidism.  Previously seen by me 7/2021.    Lost to follow up.    With regards to osteoporosis:     BMD:   7/26/2021  FINDINGS:  Lumbar spine (L1-L4): BMD is 0.794 g/cm2, T-score is -2.3, and Z-score is -0.6.  Total hip: BMD is 0.740 g/cm2, T-score is -1.7, and Z-score is -0.6.  Femoral neck: BMD is 0.579 g/cm2, T-score is -2.4, and Z-score is -1.1.  Distal 1/3 radius: BMD is 0.539 g/cm2, T-score is -2.6, and Z-score is -0.1.  FRAX:  7.0% risk of a major osteoporotic fracture in the next 10 years.  2.0% risk of hip fracture in the next 10 years.  Impression:  *Osteoporosis based on T-score below -2.5  *Fracture risk is high  *Compared with previous DXA, BMD at the lumbar spine has remained stable, and the BMD at the total hip has declined by 4.5%.  RECOMMENDATIONS:  *Daily calcium intake 9103-3079 mg, dietary sources preferred; Vitamin D 0639-6205 IU daily.  *Weight bearing exercise and fall precautions.  *If primary hyperparathyroidism is confirmed, surgery would be indicated based on osteoporosis.  *Repeat BMD in 2 years    Medications:   Fosamax   Start Date: 2014 - took for about 2 weeks, but she had tongue swelling     Calcium intake: None.  Vit D intake: OTC Vit D3 1000iu daily (reduced from 5000iu 8/2022)     Weight bearing exercise: walking  Falls: Denies.   Fractures: Denies any previous fracture.   Significant height loss (>2 inches): Denies    Family history: Denies    Menopause: ~57y.o.  No HRT.    Tobacco Use: Denies  Alcohol Use: Rarely   Glucocorticoid History: Denies  Anticoagulant Use: Denies  GERD/PPI Use: Reflux, takes medication PRN  History of Malabsorption: Denies  Antiseizure Medications: Denies  History of Thyroid Disease: Denies  Kidney Stones/ Kidney Disease:  Denies  Personal history of kidney stones: Denies  Family history of kidney stones: Denies  Family history of bone disease or fracture: Denies    Denies point tenderness along spine  Denies bilateral hip tenderness  Gait - steady     Lab Results   Component Value Date    CALCIUM 10.1 07/25/2022    PHOS 2.8 07/28/2021     Vit D, 25-Hydroxy   Date Value Ref Range Status   07/25/2022 106 (H) 30 - 96 ng/mL Final     Comment:     Vitamin D deficiency.........<10 ng/mL                              Vitamin D insufficiency......10-29 ng/mL       Vitamin D sufficiency........> or equal to 30 ng/mL  Vitamin D toxicity............>100 ng/mL       With regards to the hypercalcemia or primary hyperparathyroidism:    Lab Results   Component Value Date    .4 (H) 07/25/2022    CALCIUM 10.1 07/25/2022    PHOS 2.8 07/28/2021     Lab Results   Component Value Date    ALBUMIN 4.3 07/25/2022          Latest Reference Range & Units 07/28/21 10:12   CREATININE, URINE (SEND OUT) 15.0 - 325.0 mg/dL 69.0   Calcium, Urine 0.0 - 15.0 mg/dL 19.8 (H)   Calcium, 24 Hr Urine 4 - 12 mg/Hr 11   Calcium, Urine (mg/spec) mg/Spec 257   Creatinine, Urinr (mg/spec) mg/Spec 897.0   Creatinine, Timed Urine 40.0 - 75.0 mg/Hr 37.4 (L)   (H): Data is abnormally high  (L): Data is abnormally low    Thyroid US  7/28/2022  FINDINGS:  Right lobe measures 4.2 x 0.8 x 1 cm.  Left lobe measures 3.4 x 1 x 1.1 cm.  Subcentimeter hypoechoic nodule in the left upper lobe may be solid measuring 0.4 cm.  Additional benign colloid cysts scattered in the left lobe.  Impression:  Subcentimeter thyroid nodules do not meet criteria for follow-up or FNA.  No suspicious nodule adjacent to the thyroid to suggest parathyroid adenoma in this patient with provided history of hyperparathyroidism.    Daily intake of calcium is: None  Daily intake of vitamin D:  OTC Vit D3 1000iu daily    BMD:  see above    Denies constipation, polyuria, depression, muscle aches or  "pains.  Reports L knee pain.   Denies fractures, height loss or kidney stones.  Denies history of renal disease.  Denies family history of hyperparathyroidism or calcium problems.  Denies HCTZ., lithium, or chlorthiadone use.      Review of Systems     As above    Objective:   Physical Exam  Vitals reviewed.   Neck:      Thyroid: No thyromegaly.   Cardiovascular:      Rate and Rhythm: Normal rate.      Comments: No edema present  Pulmonary:      Effort: Pulmonary effort is normal.   Abdominal:      Palpations: Abdomen is soft.       Visit Vitals  BP (!) 140/80 (BP Location: Right arm, Patient Position: Sitting, BP Method: Small (Manual))   Pulse 64   Ht 5' 6" (1.676 m)   Wt 71.3 kg (157 lb 4.8 oz)   LMP 05/30/2000 (Approximate)   SpO2 99%   BMI 25.39 kg/m²       Body mass index is 25.39 kg/m².    Lab Review:   Lab Results   Component Value Date    HGBA1C 5.7 (H) 07/25/2022    HGBA1C 5.7 (H) 03/07/2019    HGBA1C 5.7 01/18/2016       Lab Results   Component Value Date    CHOL 253 (H) 07/25/2022    HDL 67 07/25/2022    LDLCALC 167.6 (H) 07/25/2022    TRIG 92 07/25/2022    CHOLHDL 26.5 07/25/2022     Lab Results   Component Value Date     07/25/2022    K 4.4 07/25/2022     07/25/2022    CO2 29 07/25/2022    GLU 94 07/25/2022    BUN 10 07/25/2022    CREATININE 0.7 07/25/2022    CALCIUM 10.1 07/25/2022    PROT 7.1 07/25/2022    ALBUMIN 4.3 07/25/2022    BILITOT 0.6 07/25/2022    ALKPHOS 104 07/25/2022    AST 14 07/25/2022    ALT 11 07/25/2022    ANIONGAP 8 07/25/2022    ESTGFRAFRICA >60.0 07/25/2022    EGFRNONAA >60.0 07/25/2022    TSH 1.412 07/25/2022     Vit D, 25-Hydroxy   Date Value Ref Range Status   07/25/2022 106 (H) 30 - 96 ng/mL Final     Comment:     Vitamin D deficiency.........<10 ng/mL                              Vitamin D insufficiency......10-29 ng/mL       Vitamin D sufficiency........> or equal to 30 ng/mL  Vitamin D toxicity............>100 ng/mL       Assessment and Plan     1. " Hyperparathyroidism, unspecified  Ambulatory referral/consult to Endocrinology    NM Parathyroid Scan with SPECT Routine    PTH, Intact    Renal Function Panel    Vitamin D      2. Age-related osteoporosis without current pathological fracture            Hyperparathyroidism, unspecified  -- Intermittent hypercalcemia dating back to ~ 2017.  -- 7/2021 24 urine calcium and creatinine exclude FHH.  -- Repeat RFP, vitamin D, PTH.   -- Neck ultrasound to evaluate for parathyroid adenoma completed 7/2022.  -- Schedule nuclear medicine parathyroid scan for localization.  -- Discussed indications for surgery if primary hyperparathyroidism proven.  -- Avoid dehydration, excessive calcium supplementation and meds (HCTZ) that can worsen hypercalcemia.  -- Patient meets surgical criteria - she wants to proceed with localization studies but wants to talk with her family before saying yes to surgery.  -- Follow up after results reviewed.    Osteoporosis  -- Risks include menopause, ETOH,  +FH, hyperparathyroidism.  -- Reviewed basics of quantity versus quality.  -- Reassuring they are not fracturing.  -- Recommend:  -Pharmacological therapy is recommended for patients with osteopenia if the 10 year probability of a hip fracture is >3% and 10 year probability of other major osteoporotic fractures is >20%.  Treatment options and potential side effects discussed for PO bisphosphonates, reclast, Denosumab, and Teriparatide.   -Treatment: did not discuss in detail. Would benefit from parathyroid surgery if she localizes.  -Calcium and Vitamin D RDD provided.  -Exercise: recommended  -Fall precautions made in the home.  -Alerted that if dental work needs to be done it should be done prior to initiating therapy. Dental health: UTD.  -- Repeat DEXA scan 7/2023.      Follow up in about 6 months (around 5/22/2023).

## 2022-11-22 ENCOUNTER — PATIENT MESSAGE (OUTPATIENT)
Dept: ENDOCRINOLOGY | Facility: CLINIC | Age: 77
End: 2022-11-22

## 2022-11-22 ENCOUNTER — LAB VISIT (OUTPATIENT)
Dept: LAB | Facility: HOSPITAL | Age: 77
End: 2022-11-22
Payer: COMMERCIAL

## 2022-11-22 ENCOUNTER — OFFICE VISIT (OUTPATIENT)
Dept: ENDOCRINOLOGY | Facility: CLINIC | Age: 77
End: 2022-11-22
Payer: COMMERCIAL

## 2022-11-22 VITALS
HEIGHT: 66 IN | BODY MASS INDEX: 25.28 KG/M2 | SYSTOLIC BLOOD PRESSURE: 140 MMHG | HEART RATE: 64 BPM | DIASTOLIC BLOOD PRESSURE: 80 MMHG | WEIGHT: 157.31 LBS | OXYGEN SATURATION: 99 %

## 2022-11-22 DIAGNOSIS — E21.3 HYPERPARATHYROIDISM, UNSPECIFIED: ICD-10-CM

## 2022-11-22 DIAGNOSIS — M81.0 AGE-RELATED OSTEOPOROSIS WITHOUT CURRENT PATHOLOGICAL FRACTURE: Chronic | ICD-10-CM

## 2022-11-22 DIAGNOSIS — E21.3 HYPERPARATHYROIDISM, UNSPECIFIED: Primary | ICD-10-CM

## 2022-11-22 LAB
25(OH)D3+25(OH)D2 SERPL-MCNC: 60 NG/ML (ref 30–96)
ALBUMIN SERPL BCP-MCNC: 4 G/DL (ref 3.5–5.2)
ANION GAP SERPL CALC-SCNC: 6 MMOL/L (ref 8–16)
BUN SERPL-MCNC: 12 MG/DL (ref 8–23)
CALCIUM SERPL-MCNC: 10 MG/DL (ref 8.7–10.5)
CHLORIDE SERPL-SCNC: 105 MMOL/L (ref 95–110)
CO2 SERPL-SCNC: 29 MMOL/L (ref 23–29)
CREAT SERPL-MCNC: 0.7 MG/DL (ref 0.5–1.4)
EST. GFR  (NO RACE VARIABLE): >60 ML/MIN/1.73 M^2
GLUCOSE SERPL-MCNC: 95 MG/DL (ref 70–110)
PHOSPHATE SERPL-MCNC: 2.9 MG/DL (ref 2.7–4.5)
POTASSIUM SERPL-SCNC: 4.1 MMOL/L (ref 3.5–5.1)
PTH-INTACT SERPL-MCNC: 153.8 PG/ML (ref 9–77)
SODIUM SERPL-SCNC: 140 MMOL/L (ref 136–145)

## 2022-11-22 PROCEDURE — 99999 PR PBB SHADOW E&M-EST. PATIENT-LVL IV: CPT | Mod: PBBFAC,,, | Performed by: NURSE PRACTITIONER

## 2022-11-22 PROCEDURE — 3077F SYST BP >= 140 MM HG: CPT | Mod: CPTII,S$GLB,, | Performed by: NURSE PRACTITIONER

## 2022-11-22 PROCEDURE — 83970 ASSAY OF PARATHORMONE: CPT | Performed by: NURSE PRACTITIONER

## 2022-11-22 PROCEDURE — 1160F PR REVIEW ALL MEDS BY PRESCRIBER/CLIN PHARMACIST DOCUMENTED: ICD-10-PCS | Mod: CPTII,S$GLB,, | Performed by: NURSE PRACTITIONER

## 2022-11-22 PROCEDURE — 3077F PR MOST RECENT SYSTOLIC BLOOD PRESSURE >= 140 MM HG: ICD-10-PCS | Mod: CPTII,S$GLB,, | Performed by: NURSE PRACTITIONER

## 2022-11-22 PROCEDURE — 1160F RVW MEDS BY RX/DR IN RCRD: CPT | Mod: CPTII,S$GLB,, | Performed by: NURSE PRACTITIONER

## 2022-11-22 PROCEDURE — 1125F PR PAIN SEVERITY QUANTIFIED, PAIN PRESENT: ICD-10-PCS | Mod: CPTII,S$GLB,, | Performed by: NURSE PRACTITIONER

## 2022-11-22 PROCEDURE — 3044F HG A1C LEVEL LT 7.0%: CPT | Mod: CPTII,S$GLB,, | Performed by: NURSE PRACTITIONER

## 2022-11-22 PROCEDURE — 1101F PR PT FALLS ASSESS DOC 0-1 FALLS W/OUT INJ PAST YR: ICD-10-PCS | Mod: CPTII,S$GLB,, | Performed by: NURSE PRACTITIONER

## 2022-11-22 PROCEDURE — 1101F PT FALLS ASSESS-DOCD LE1/YR: CPT | Mod: CPTII,S$GLB,, | Performed by: NURSE PRACTITIONER

## 2022-11-22 PROCEDURE — 1159F MED LIST DOCD IN RCRD: CPT | Mod: CPTII,S$GLB,, | Performed by: NURSE PRACTITIONER

## 2022-11-22 PROCEDURE — 3079F PR MOST RECENT DIASTOLIC BLOOD PRESSURE 80-89 MM HG: ICD-10-PCS | Mod: CPTII,S$GLB,, | Performed by: NURSE PRACTITIONER

## 2022-11-22 PROCEDURE — 3079F DIAST BP 80-89 MM HG: CPT | Mod: CPTII,S$GLB,, | Performed by: NURSE PRACTITIONER

## 2022-11-22 PROCEDURE — 3044F PR MOST RECENT HEMOGLOBIN A1C LEVEL <7.0%: ICD-10-PCS | Mod: CPTII,S$GLB,, | Performed by: NURSE PRACTITIONER

## 2022-11-22 PROCEDURE — 99214 PR OFFICE/OUTPT VISIT, EST, LEVL IV, 30-39 MIN: ICD-10-PCS | Mod: S$GLB,,, | Performed by: NURSE PRACTITIONER

## 2022-11-22 PROCEDURE — 99999 PR PBB SHADOW E&M-EST. PATIENT-LVL IV: ICD-10-PCS | Mod: PBBFAC,,, | Performed by: NURSE PRACTITIONER

## 2022-11-22 PROCEDURE — 3288F PR FALLS RISK ASSESSMENT DOCUMENTED: ICD-10-PCS | Mod: CPTII,S$GLB,, | Performed by: NURSE PRACTITIONER

## 2022-11-22 PROCEDURE — 80069 RENAL FUNCTION PANEL: CPT | Performed by: NURSE PRACTITIONER

## 2022-11-22 PROCEDURE — 82306 VITAMIN D 25 HYDROXY: CPT | Performed by: NURSE PRACTITIONER

## 2022-11-22 PROCEDURE — 3288F FALL RISK ASSESSMENT DOCD: CPT | Mod: CPTII,S$GLB,, | Performed by: NURSE PRACTITIONER

## 2022-11-22 PROCEDURE — 99214 OFFICE O/P EST MOD 30 MIN: CPT | Mod: S$GLB,,, | Performed by: NURSE PRACTITIONER

## 2022-11-22 PROCEDURE — 1159F PR MEDICATION LIST DOCUMENTED IN MEDICAL RECORD: ICD-10-PCS | Mod: CPTII,S$GLB,, | Performed by: NURSE PRACTITIONER

## 2022-11-22 PROCEDURE — 1125F AMNT PAIN NOTED PAIN PRSNT: CPT | Mod: CPTII,S$GLB,, | Performed by: NURSE PRACTITIONER

## 2022-11-22 PROCEDURE — 36415 COLL VENOUS BLD VENIPUNCTURE: CPT | Performed by: NURSE PRACTITIONER

## 2022-11-22 NOTE — ASSESSMENT & PLAN NOTE
-- Risks include menopause, ETOH,  +FH, hyperparathyroidism.  -- Reviewed basics of quantity versus quality.  -- Reassuring they are not fracturing.  -- Recommend:  -Pharmacological therapy is recommended for patients with osteopenia if the 10 year probability of a hip fracture is >3% and 10 year probability of other major osteoporotic fractures is >20%.  Treatment options and potential side effects discussed for PO bisphosphonates, reclast, Denosumab, and Teriparatide.   -Treatment: did not discuss in detail. Would benefit from parathyroid surgery if she localizes.  -Calcium and Vitamin D RDD provided.  -Exercise: recommended  -Fall precautions made in the home.  -Alerted that if dental work needs to be done it should be done prior to initiating therapy. Dental health: UTD.  -- Repeat DEXA scan 7/2023.

## 2022-11-22 NOTE — TELEPHONE ENCOUNTER
Component      Latest Ref Rng & Units 11/22/2022   Glucose      70 - 110 mg/dL 95   Sodium      136 - 145 mmol/L 140   Potassium      3.5 - 5.1 mmol/L 4.1   Chloride      95 - 110 mmol/L 105   CO2      23 - 29 mmol/L 29   BUN      8 - 23 mg/dL 12   Calcium      8.7 - 10.5 mg/dL 10.0   Creatinine      0.5 - 1.4 mg/dL 0.7   Albumin      3.5 - 5.2 g/dL 4.0   Phosphorus      2.7 - 4.5 mg/dL 2.9   eGFR      >60 mL/min/1.73 m:2 >60.0   Anion Gap      8 - 16 mmol/L 6 (L)   PTH      9.0 - 77.0 pg/mL 153.8 (H)   Vit D, 25-Hydroxy      30 - 96 ng/mL 60

## 2022-11-22 NOTE — ASSESSMENT & PLAN NOTE
-- Intermittent hypercalcemia dating back to ~ 2017.  -- 7/2021 24 urine calcium and creatinine exclude FHH.  -- Repeat RFP, vitamin D, PTH.   -- Neck ultrasound to evaluate for parathyroid adenoma completed 7/2022.  -- Schedule nuclear medicine parathyroid scan for localization.  -- Discussed indications for surgery if primary hyperparathyroidism proven.  -- Avoid dehydration, excessive calcium supplementation and meds (HCTZ) that can worsen hypercalcemia.  -- Patient meets surgical criteria - she wants to proceed with localization studies but wants to talk with her family before saying yes to surgery.  -- Follow up after results reviewed.

## 2022-12-19 ENCOUNTER — IMMUNIZATION (OUTPATIENT)
Dept: INTERNAL MEDICINE | Facility: CLINIC | Age: 77
End: 2022-12-19
Payer: COMMERCIAL

## 2022-12-19 PROCEDURE — 90471 IMMUNIZATION ADMIN: CPT | Mod: S$GLB,,, | Performed by: INTERNAL MEDICINE

## 2022-12-19 PROCEDURE — 90694 VACC AIIV4 NO PRSRV 0.5ML IM: CPT | Mod: S$GLB,,, | Performed by: INTERNAL MEDICINE

## 2022-12-19 PROCEDURE — 90471 FLU VACCINE - QUADRIVALENT - ADJUVANTED: ICD-10-PCS | Mod: S$GLB,,, | Performed by: INTERNAL MEDICINE

## 2022-12-19 PROCEDURE — 90694 FLU VACCINE - QUADRIVALENT - ADJUVANTED: ICD-10-PCS | Mod: S$GLB,,, | Performed by: INTERNAL MEDICINE

## 2022-12-30 NOTE — PROGRESS NOTES
"Subjective:       Patient ID: Kj Chávez is a 71 y.o. female.    Chief Complaint: Generalized Body Aches and Fever   this is a 71-year-old who presents today for urgent visit upper respiratory symptoms.  She's been having dry cough sore throat postnasal drip and upper respiratory symptoms since yesterday after Jehovah's witness.  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  Patient has been taking over-the-counter cough and cold preparation with some improvement.  She denies abdominal pain and urinary symptoms and reports her fever was up to about 101 yesterday.    HPI  Review of Systems   Constitutional: Positive for fatigue and fever.        Body aches    HENT: Positive for congestion and postnasal drip.    Respiratory: Positive for cough. Negative for shortness of breath and wheezing.    Gastrointestinal: Negative for abdominal pain.   Genitourinary: Negative for dysuria and frequency.       Objective:     Blood pressure 124/84, pulse 76, temperature 99.6 °F (37.6 °C), height 5' 6" (1.676 m), weight 72.8 kg (160 lb 7.9 oz).    Physical Exam   Constitutional: No distress.   HENT:   Head: Normocephalic.   Mouth/Throat: Oropharynx is clear and moist.   Eyes: No scleral icterus.   Neck: Neck supple.   Cardiovascular: Normal rate, regular rhythm and normal heart sounds.  Exam reveals no gallop and no friction rub.    No murmur heard.  Pulmonary/Chest: Effort normal and breath sounds normal. No respiratory distress.   Abdominal: Soft. Bowel sounds are normal. She exhibits no mass. There is no tenderness.   Musculoskeletal: She exhibits no edema.   Neurological: She is alert.   Skin: No erythema.   Psychiatric: She has a normal mood and affect.   Vitals reviewed.      Assessment:       1. Upper respiratory tract infection, unspecified type    2. Sore throat        Plan:       Kj was seen today for generalized body aches and fever.    Diagnoses and all orders for this visit:    Upper respiratory " tract infection, unspecified type  -     Influenza antigen Nasopharyngeal Swab  -     Throat Screen, Rapid    Sore throat    -     Throat Screen, Rapid    Other orders  -     promethazine-dextromethorphan (PROMETHAZINE-DM) 6.25-15 mg/5 mL Syrp; Take 5 mLs by mouth nightly as needed.    We'll send strep and flu screen for further review we discussed with patient and treat if indicated for now rest fluids    work note provided for one day Tuesday Wednesday recommended until fever free      she will call/follow up PCP if no improvement or increase concerns             DISPLAY PLAN FREE TEXT

## 2023-01-04 ENCOUNTER — TELEPHONE (OUTPATIENT)
Dept: INTERNAL MEDICINE | Facility: CLINIC | Age: 78
End: 2023-01-04
Payer: COMMERCIAL

## 2023-01-04 ENCOUNTER — OFFICE VISIT (OUTPATIENT)
Dept: INTERNAL MEDICINE | Facility: CLINIC | Age: 78
End: 2023-01-04
Payer: COMMERCIAL

## 2023-01-04 ENCOUNTER — LAB VISIT (OUTPATIENT)
Dept: LAB | Facility: HOSPITAL | Age: 78
End: 2023-01-04
Attending: INTERNAL MEDICINE
Payer: COMMERCIAL

## 2023-01-04 VITALS
DIASTOLIC BLOOD PRESSURE: 95 MMHG | HEIGHT: 66 IN | SYSTOLIC BLOOD PRESSURE: 155 MMHG | BODY MASS INDEX: 25.39 KG/M2 | WEIGHT: 158 LBS

## 2023-01-04 DIAGNOSIS — B96.89 ACUTE BACTERIAL SINUSITIS: Primary | ICD-10-CM

## 2023-01-04 DIAGNOSIS — R03.0 ELEVATED BLOOD PRESSURE READING WITHOUT DIAGNOSIS OF HYPERTENSION: Primary | ICD-10-CM

## 2023-01-04 DIAGNOSIS — J01.90 ACUTE BACTERIAL SINUSITIS: Primary | ICD-10-CM

## 2023-01-04 DIAGNOSIS — R03.0 ELEVATED BLOOD PRESSURE READING WITHOUT DIAGNOSIS OF HYPERTENSION: ICD-10-CM

## 2023-01-04 DIAGNOSIS — J06.9 UPPER RESPIRATORY TRACT INFECTION, UNSPECIFIED TYPE: ICD-10-CM

## 2023-01-04 DIAGNOSIS — R42 LIGHTHEADEDNESS: ICD-10-CM

## 2023-01-04 DIAGNOSIS — E21.3 HYPERPARATHYROIDISM, UNSPECIFIED: ICD-10-CM

## 2023-01-04 PROBLEM — Z12.11 SCREENING FOR MALIGNANT NEOPLASM OF COLON: Status: RESOLVED | Noted: 2021-07-19 | Resolved: 2023-01-04

## 2023-01-04 LAB
ALBUMIN SERPL BCP-MCNC: 4.1 G/DL (ref 3.5–5.2)
ALP SERPL-CCNC: 99 U/L (ref 55–135)
ALT SERPL W/O P-5'-P-CCNC: 9 U/L (ref 10–44)
ANION GAP SERPL CALC-SCNC: 12 MMOL/L (ref 8–16)
AST SERPL-CCNC: 14 U/L (ref 10–40)
BASOPHILS # BLD AUTO: 0.04 K/UL (ref 0–0.2)
BASOPHILS NFR BLD: 0.8 % (ref 0–1.9)
BILIRUB SERPL-MCNC: 0.4 MG/DL (ref 0.1–1)
BUN SERPL-MCNC: 12 MG/DL (ref 8–23)
CALCIUM SERPL-MCNC: 10.2 MG/DL (ref 8.7–10.5)
CHLORIDE SERPL-SCNC: 103 MMOL/L (ref 95–110)
CO2 SERPL-SCNC: 26 MMOL/L (ref 23–29)
CREAT SERPL-MCNC: 0.7 MG/DL (ref 0.5–1.4)
DIFFERENTIAL METHOD: ABNORMAL
EOSINOPHIL # BLD AUTO: 0.1 K/UL (ref 0–0.5)
EOSINOPHIL NFR BLD: 2 % (ref 0–8)
ERYTHROCYTE [DISTWIDTH] IN BLOOD BY AUTOMATED COUNT: 14.5 % (ref 11.5–14.5)
EST. GFR  (NO RACE VARIABLE): >60 ML/MIN/1.73 M^2
GLUCOSE SERPL-MCNC: 98 MG/DL (ref 70–110)
HCT VFR BLD AUTO: 41.4 % (ref 37–48.5)
HGB BLD-MCNC: 12.5 G/DL (ref 12–16)
IMM GRANULOCYTES # BLD AUTO: 0.03 K/UL (ref 0–0.04)
IMM GRANULOCYTES NFR BLD AUTO: 0.6 % (ref 0–0.5)
LYMPHOCYTES # BLD AUTO: 2 K/UL (ref 1–4.8)
LYMPHOCYTES NFR BLD: 39.4 % (ref 18–48)
MCH RBC QN AUTO: 21.9 PG (ref 27–31)
MCHC RBC AUTO-ENTMCNC: 30.2 G/DL (ref 32–36)
MCV RBC AUTO: 72 FL (ref 82–98)
MONOCYTES # BLD AUTO: 0.6 K/UL (ref 0.3–1)
MONOCYTES NFR BLD: 11.4 % (ref 4–15)
NEUTROPHILS # BLD AUTO: 2.3 K/UL (ref 1.8–7.7)
NEUTROPHILS NFR BLD: 45.8 % (ref 38–73)
NRBC BLD-RTO: 0 /100 WBC
PLATELET # BLD AUTO: 206 K/UL (ref 150–450)
PMV BLD AUTO: 13 FL (ref 9.2–12.9)
POTASSIUM SERPL-SCNC: 4.6 MMOL/L (ref 3.5–5.1)
PROT SERPL-MCNC: 7.3 G/DL (ref 6–8.4)
RBC # BLD AUTO: 5.72 M/UL (ref 4–5.4)
SODIUM SERPL-SCNC: 141 MMOL/L (ref 136–145)
TSH SERPL DL<=0.005 MIU/L-ACNC: 1.91 UIU/ML (ref 0.4–4)
WBC # BLD AUTO: 4.98 K/UL (ref 3.9–12.7)

## 2023-01-04 PROCEDURE — 87635: ICD-10-PCS | Mod: QW,S$GLB,, | Performed by: INTERNAL MEDICINE

## 2023-01-04 PROCEDURE — 1126F PR PAIN SEVERITY QUANTIFIED, NO PAIN PRESENT: ICD-10-PCS | Mod: CPTII,S$GLB,, | Performed by: INTERNAL MEDICINE

## 2023-01-04 PROCEDURE — 3080F PR MOST RECENT DIASTOLIC BLOOD PRESSURE >= 90 MM HG: ICD-10-PCS | Mod: CPTII,S$GLB,, | Performed by: INTERNAL MEDICINE

## 2023-01-04 PROCEDURE — 99214 PR OFFICE/OUTPT VISIT, EST, LEVL IV, 30-39 MIN: ICD-10-PCS | Mod: S$GLB,,, | Performed by: INTERNAL MEDICINE

## 2023-01-04 PROCEDURE — 93010 ELECTROCARDIOGRAM REPORT: CPT | Mod: S$GLB,,, | Performed by: INTERNAL MEDICINE

## 2023-01-04 PROCEDURE — 1126F AMNT PAIN NOTED NONE PRSNT: CPT | Mod: CPTII,S$GLB,, | Performed by: INTERNAL MEDICINE

## 2023-01-04 PROCEDURE — 1159F MED LIST DOCD IN RCRD: CPT | Mod: CPTII,S$GLB,, | Performed by: INTERNAL MEDICINE

## 2023-01-04 PROCEDURE — 1101F PR PT FALLS ASSESS DOC 0-1 FALLS W/OUT INJ PAST YR: ICD-10-PCS | Mod: CPTII,S$GLB,, | Performed by: INTERNAL MEDICINE

## 2023-01-04 PROCEDURE — 93010 EKG 12-LEAD: ICD-10-PCS | Mod: S$GLB,,, | Performed by: INTERNAL MEDICINE

## 2023-01-04 PROCEDURE — 99214 OFFICE O/P EST MOD 30 MIN: CPT | Mod: S$GLB,,, | Performed by: INTERNAL MEDICINE

## 2023-01-04 PROCEDURE — 36415 COLL VENOUS BLD VENIPUNCTURE: CPT | Performed by: INTERNAL MEDICINE

## 2023-01-04 PROCEDURE — 3080F DIAST BP >= 90 MM HG: CPT | Mod: CPTII,S$GLB,, | Performed by: INTERNAL MEDICINE

## 2023-01-04 PROCEDURE — 1101F PT FALLS ASSESS-DOCD LE1/YR: CPT | Mod: CPTII,S$GLB,, | Performed by: INTERNAL MEDICINE

## 2023-01-04 PROCEDURE — 3288F FALL RISK ASSESSMENT DOCD: CPT | Mod: CPTII,S$GLB,, | Performed by: INTERNAL MEDICINE

## 2023-01-04 PROCEDURE — 3077F SYST BP >= 140 MM HG: CPT | Mod: CPTII,S$GLB,, | Performed by: INTERNAL MEDICINE

## 2023-01-04 PROCEDURE — 3077F PR MOST RECENT SYSTOLIC BLOOD PRESSURE >= 140 MM HG: ICD-10-PCS | Mod: CPTII,S$GLB,, | Performed by: INTERNAL MEDICINE

## 2023-01-04 PROCEDURE — 87502 POCT INFLUENZA A/B MOLECULAR: ICD-10-PCS | Mod: QW,S$GLB,, | Performed by: INTERNAL MEDICINE

## 2023-01-04 PROCEDURE — 93005 ELECTROCARDIOGRAM TRACING: CPT | Mod: S$GLB,,, | Performed by: INTERNAL MEDICINE

## 2023-01-04 PROCEDURE — 84443 ASSAY THYROID STIM HORMONE: CPT | Performed by: INTERNAL MEDICINE

## 2023-01-04 PROCEDURE — 99999 PR PBB SHADOW E&M-EST. PATIENT-LVL III: CPT | Mod: PBBFAC,,, | Performed by: INTERNAL MEDICINE

## 2023-01-04 PROCEDURE — 93005 EKG 12-LEAD: ICD-10-PCS | Mod: S$GLB,,, | Performed by: INTERNAL MEDICINE

## 2023-01-04 PROCEDURE — 80053 COMPREHEN METABOLIC PANEL: CPT | Performed by: INTERNAL MEDICINE

## 2023-01-04 PROCEDURE — 3288F PR FALLS RISK ASSESSMENT DOCUMENTED: ICD-10-PCS | Mod: CPTII,S$GLB,, | Performed by: INTERNAL MEDICINE

## 2023-01-04 PROCEDURE — 85025 COMPLETE CBC W/AUTO DIFF WBC: CPT | Performed by: INTERNAL MEDICINE

## 2023-01-04 PROCEDURE — 1159F PR MEDICATION LIST DOCUMENTED IN MEDICAL RECORD: ICD-10-PCS | Mod: CPTII,S$GLB,, | Performed by: INTERNAL MEDICINE

## 2023-01-04 PROCEDURE — 99999 PR PBB SHADOW E&M-EST. PATIENT-LVL III: ICD-10-PCS | Mod: PBBFAC,,, | Performed by: INTERNAL MEDICINE

## 2023-01-04 PROCEDURE — 87502 INFLUENZA DNA AMP PROBE: CPT | Mod: QW,S$GLB,, | Performed by: INTERNAL MEDICINE

## 2023-01-04 PROCEDURE — 87635 SARS-COV-2 COVID-19 AMP PRB: CPT | Mod: QW,S$GLB,, | Performed by: INTERNAL MEDICINE

## 2023-01-04 RX ORDER — AMOXICILLIN AND CLAVULANATE POTASSIUM 875; 125 MG/1; MG/1
1 TABLET, FILM COATED ORAL 2 TIMES DAILY
Qty: 20 TABLET | Refills: 0 | Status: SHIPPED | OUTPATIENT
Start: 2023-01-04

## 2023-01-04 NOTE — TELEPHONE ENCOUNTER
----- Message from Adelina Nolen sent at 1/4/2023  2:46 PM CST -----  Regarding: lab  Good Evening,         Patient dropped off urine Please provide order.

## 2023-01-04 NOTE — PROGRESS NOTES
" Patient ID: Kj Chávez is a 77 y.o. female.    Chief Complaint: Dizziness      Assessment:       1. Acute bacterial sinusitis    2. Upper respiratory tract infection, unspecified type    3. Hyperparathyroidism, unspecified    4. Elevated blood pressure reading without diagnosis of hypertension    5. Lightheadedness          Plan:         1. Acute bacterial sinusitis  -     amoxicillin-clavulanate 875-125mg (AUGMENTIN) 875-125 mg per tablet; Take 1 tablet by mouth 2 (two) times daily.  Dispense: 20 tablet; Refill: 0    2. Upper respiratory tract infection, unspecified type  -     POCT COVID-19 Rapid Screening- negative  -     POCT Influenza A/B Molecular- negative    4. Elevated blood pressure reading without diagnosis of hypertension: Low salt diet, exercise, 5-10-# weight loss in next 6-12 months' time.  Call if BP > 130/80 on a regular basis.   Will need to return to PCP for review  -     CBC Auto Differential; Future; Expected date: 01/04/2023  -     Comprehensive Metabolic Panel; Future; Expected date: 01/04/2023  -     TSH; Future; Expected date: 01/04/2023  -     Urinalysis, Reflex to Urine Culture Urine, Clean Catch    5. Lightheadedness  -     EKG 12-lead: in office NSR, no ischemia     Rest, fluids, acetaminophen, mucinex and follow up poor results.   I will review all studies and determine further tx depending on findings       Subjective:   [facility] appt[facility] Dr Pastrana    She felt lightheaded on Sunday.   This occurred while she was standing, cooking.  Sx lasted about 30 minutes, she had some "dizziness" with this, felt a little off balance.  After drinking water and sitting down she felt better.    Yesterday she felt like she got overheated, not exactly fever.    Slight ear pain.   States occasional cough but no more than usual.  Some facial pain and tooth pain and sinus sx.    Never smoked.  No DM.    Patient Active Problem List:     Alpha thalassemia trait     History of colon polyps     Aortic " atherosclerosis     History of benign gastric tumor: see notes from EGD 2015, benign.  EGD 8/17 stable     Osteoporosis     Renal cysts, acquired, bilateral     Decreased strength of trunk and back     Decreased range of motion of lumbar spine     Hyperparathyroidism, unspecified       Review of Systems   Constitutional:  Positive for fatigue. Negative for chills and fever.   HENT:  Positive for congestion, postnasal drip, sinus pressure and sinus pain. Negative for ear pain.    Eyes: Negative.    Respiratory:  Negative for cough, chest tightness, shortness of breath and wheezing.    Cardiovascular: Negative.  Negative for chest pain and palpitations.   Gastrointestinal: Negative.    Neurological:  Positive for light-headedness.        See above       Objective:      Physical Exam  Constitutional:       Appearance: She is well-developed.   HENT:      Head: Normocephalic and atraumatic.      Right Ear: External ear normal.      Left Ear: External ear normal.   Eyes:      Extraocular Movements: Extraocular movements intact.      Conjunctiva/sclera: Conjunctivae normal.   Neck:      Thyroid: No thyromegaly.   Cardiovascular:      Rate and Rhythm: Normal rate and regular rhythm.      Heart sounds: Normal heart sounds.   Pulmonary:      Effort: No respiratory distress.      Breath sounds: No wheezing or rales.   Abdominal:      General: Bowel sounds are normal.      Palpations: Abdomen is soft.   Musculoskeletal:      Cervical back: Normal range of motion and neck supple.   Lymphadenopathy:      Cervical: No cervical adenopathy.   Skin:     General: Skin is warm and dry.      Findings: No erythema or rash.   Neurological:      General: No focal deficit present.      Mental Status: She is alert and oriented to person, place, and time.   Psychiatric:         Mood and Affect: Mood normal.         Behavior: Behavior normal.         Thought Content: Thought content normal.         Judgment: Judgment normal.           Health  Maintenance Due   Topic Date Due    Shingles Vaccine (2 of 2) 09/09/2021    COVID-19 Vaccine (5 - Booster for Pfizer series) 09/20/2022

## 2023-01-09 LAB
CTP QC/QA: YES
CTP QC/QA: YES
POC MOLECULAR INFLUENZA A AGN: NEGATIVE
POC MOLECULAR INFLUENZA B AGN: NEGATIVE
SARS-COV-2 RDRP RESP QL NAA+PROBE: NEGATIVE

## 2023-05-12 ENCOUNTER — OFFICE VISIT (OUTPATIENT)
Dept: INTERNAL MEDICINE | Facility: CLINIC | Age: 78
End: 2023-05-12
Payer: COMMERCIAL

## 2023-05-12 ENCOUNTER — HOSPITAL ENCOUNTER (OUTPATIENT)
Dept: RADIOLOGY | Facility: HOSPITAL | Age: 78
Discharge: HOME | End: 2023-05-12
Attending: STUDENT IN AN ORGANIZED HEALTH CARE EDUCATION/TRAINING PROGRAM
Payer: COMMERCIAL

## 2023-05-12 ENCOUNTER — TELEPHONE (OUTPATIENT)
Dept: INTERNAL MEDICINE | Facility: CLINIC | Age: 78
End: 2023-05-12

## 2023-05-12 VITALS
OXYGEN SATURATION: 97 % | RESPIRATION RATE: 16 BRPM | SYSTOLIC BLOOD PRESSURE: 112 MMHG | DIASTOLIC BLOOD PRESSURE: 64 MMHG | BODY MASS INDEX: 25.33 KG/M2 | HEART RATE: 82 BPM | HEIGHT: 66 IN | WEIGHT: 157.63 LBS
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
## 2023-05-12 PROCEDURE — 99999 PR PBB SHADOW E&M-EST. PATIENT-LVL IV: CPT | Mod: PBBFAC,,, | Performed by: STUDENT IN AN ORGANIZED HEALTH CARE EDUCATION/TRAINING PROGRAM

## 2023-05-12 PROCEDURE — 99212 PR OFFICE/OUTPT VISIT, EST, LEVL II, 10-19 MIN: ICD-10-PCS | Mod: S$GLB,,, | Performed by: STUDENT IN AN ORGANIZED HEALTH CARE EDUCATION/TRAINING PROGRAM

## 2023-05-12 PROCEDURE — 73560 XR KNEE 1 OR 2 VIEW RIGHT: ICD-10-PCS | Mod: 26,RT,, | Performed by: RADIOLOGY

## 2023-05-12 PROCEDURE — 73560 X-RAY EXAM OF KNEE 1 OR 2: CPT | Mod: TC,RT

## 2023-05-12 PROCEDURE — 1159F MED LIST DOCD IN RCRD: CPT | Mod: CPTII,S$GLB,, | Performed by: STUDENT IN AN ORGANIZED HEALTH CARE EDUCATION/TRAINING PROGRAM

## 2023-05-12 PROCEDURE — 3074F PR MOST RECENT SYSTOLIC BLOOD PRESSURE < 130 MM HG: ICD-10-PCS | Mod: CPTII,S$GLB,, | Performed by: STUDENT IN AN ORGANIZED HEALTH CARE EDUCATION/TRAINING PROGRAM

## 2023-05-12 PROCEDURE — 1159F PR MEDICATION LIST DOCUMENTED IN MEDICAL RECORD: ICD-10-PCS | Mod: CPTII,S$GLB,, | Performed by: STUDENT IN AN ORGANIZED HEALTH CARE EDUCATION/TRAINING PROGRAM

## 2023-05-12 PROCEDURE — 3078F DIAST BP <80 MM HG: CPT | Mod: CPTII,S$GLB,, | Performed by: STUDENT IN AN ORGANIZED HEALTH CARE EDUCATION/TRAINING PROGRAM

## 2023-05-12 PROCEDURE — 99212 OFFICE O/P EST SF 10 MIN: CPT | Mod: S$GLB,,, | Performed by: STUDENT IN AN ORGANIZED HEALTH CARE EDUCATION/TRAINING PROGRAM

## 2023-05-12 PROCEDURE — 1101F PT FALLS ASSESS-DOCD LE1/YR: CPT | Mod: CPTII,S$GLB,, | Performed by: STUDENT IN AN ORGANIZED HEALTH CARE EDUCATION/TRAINING PROGRAM

## 2023-05-12 PROCEDURE — 3074F SYST BP LT 130 MM HG: CPT | Mod: CPTII,S$GLB,, | Performed by: STUDENT IN AN ORGANIZED HEALTH CARE EDUCATION/TRAINING PROGRAM

## 2023-05-12 PROCEDURE — 1126F PR PAIN SEVERITY QUANTIFIED, NO PAIN PRESENT: ICD-10-PCS | Mod: CPTII,S$GLB,, | Performed by: STUDENT IN AN ORGANIZED HEALTH CARE EDUCATION/TRAINING PROGRAM

## 2023-05-12 PROCEDURE — 99999 PR PBB SHADOW E&M-EST. PATIENT-LVL IV: ICD-10-PCS | Mod: PBBFAC,,, | Performed by: STUDENT IN AN ORGANIZED HEALTH CARE EDUCATION/TRAINING PROGRAM

## 2023-05-12 PROCEDURE — 3288F PR FALLS RISK ASSESSMENT DOCUMENTED: ICD-10-PCS | Mod: CPTII,S$GLB,, | Performed by: STUDENT IN AN ORGANIZED HEALTH CARE EDUCATION/TRAINING PROGRAM

## 2023-05-12 PROCEDURE — 3288F FALL RISK ASSESSMENT DOCD: CPT | Mod: CPTII,S$GLB,, | Performed by: STUDENT IN AN ORGANIZED HEALTH CARE EDUCATION/TRAINING PROGRAM

## 2023-05-12 PROCEDURE — 1126F AMNT PAIN NOTED NONE PRSNT: CPT | Mod: CPTII,S$GLB,, | Performed by: STUDENT IN AN ORGANIZED HEALTH CARE EDUCATION/TRAINING PROGRAM

## 2023-05-12 PROCEDURE — 3078F PR MOST RECENT DIASTOLIC BLOOD PRESSURE < 80 MM HG: ICD-10-PCS | Mod: CPTII,S$GLB,, | Performed by: STUDENT IN AN ORGANIZED HEALTH CARE EDUCATION/TRAINING PROGRAM

## 2023-05-12 PROCEDURE — 73560 X-RAY EXAM OF KNEE 1 OR 2: CPT | Mod: 26,RT,, | Performed by: RADIOLOGY

## 2023-05-12 PROCEDURE — 1101F PR PT FALLS ASSESS DOC 0-1 FALLS W/OUT INJ PAST YR: ICD-10-PCS | Mod: CPTII,S$GLB,, | Performed by: STUDENT IN AN ORGANIZED HEALTH CARE EDUCATION/TRAINING PROGRAM

## 2023-05-12 NOTE — PROGRESS NOTES
Subjective     Patient ID: Kj Chávez is a 77 y.o. female.    Chief Complaint: Fall    Fall  Associated symptoms include headaches. Pertinent negatives include no fever, hematuria, nausea, numbness or vomiting.   Patient is a 76 yo female here after suffering a fall yesterday. She tripped over a jefry leg and fell backyards. Not sure if she hit her head but she is not on blood thinners. The back of her head is not sore, no bumps. She does wear a wig which she thinks might have cushioned it. Her did hit her right knee on the concrete, has scrapes on it, it was swollen yesterday but better today. Also has right hip pain, denies bruising. Also has right ankle pain. Able to bear weight on her legs, able to walk but with some pain.     Review of Systems   Constitutional:  Negative for chills and fever.   HENT:  Negative for rhinorrhea and sore throat.    Respiratory:  Negative for cough, chest tightness and shortness of breath.    Cardiovascular:  Negative for chest pain.   Gastrointestinal:  Negative for blood in stool, constipation, diarrhea, nausea and vomiting.   Genitourinary:  Negative for dysuria and hematuria.   Neurological:  Positive for headaches. Negative for dizziness, syncope, light-headedness and numbness.   Hematological:  Does not bruise/bleed easily.        Objective     Physical Exam  Constitutional:       Appearance: Normal appearance.   HENT:      Head: Atraumatic.   Eyes:      Conjunctiva/sclera: Conjunctivae normal.      Pupils: Pupils are equal, round, and reactive to light.   Cardiovascular:      Rate and Rhythm: Normal rate and regular rhythm.      Heart sounds: Normal heart sounds.   Pulmonary:      Effort: Pulmonary effort is normal. No respiratory distress.   Musculoskeletal:      Right lower leg: No edema.      Left lower leg: No edema.      Comments: Abrasions noted on right knee. Tender to palpation over the patella. No edema of the knee noted. Able to rotate right ankle.     Skin:     General: Skin is warm.      Comments: No bruising noted anywhere on the body   Neurological:      Mental Status: She is alert and oriented to person, place, and time.      Cranial Nerves: No cranial nerve deficit.   Psychiatric:         Mood and Affect: Mood normal.         Behavior: Behavior normal.          Assessment and Plan     1. Fall, initial encounter  Assessment & Plan:  I doubt she fractured anything but I do want to xray her right knee to make sure her patella is okay. Cranial nerves wnl. No bruises noted anywhere or hematomas. Not on any blood thinners     Orders:  -     X-Ray Knee 1 or 2 View Right; Future; Expected date: 05/12/2023

## 2023-05-12 NOTE — ASSESSMENT & PLAN NOTE
I doubt she fractured anything but I do want to xray her right knee to make sure her patella is okay. Cranial nerves wnl. No bruises noted anywhere or hematomas. Not on any blood thinners

## 2023-05-12 NOTE — TELEPHONE ENCOUNTER
----- Message from Daniele Rubi MD sent at 5/12/2023  3:56 PM CDT -----  Pls let patient know that there are no fractures

## 2023-06-30 NOTE — PROGRESS NOTES
Subjective:      Patient ID: Kj Chávez is a 77 y.o. female.    Chief Complaint:  No chief complaint on file.    History of Present Illness  Kj Chávez is here for follow up of osteoporosis and hyperparathyroidism.  Previously seen by me 11/2022.  This is a MyChart video visit.    The patient location is: [place]  The chief complaint leading to consultation is: Osteoporosis  Visit type: Virtual visit with synchronous audio and video  Total time spent with patient: see below  Each patient to whom he or she provides medical services by telemedicine is:  (1) informed of the relationship between the physician and patient and the respective role of any other health care provider with respect to management of the patient; and (2) notified that he or she may decline to receive medical services by telemedicine and may withdraw from such care at any time.    Lost to follow up - did not complete follow up testing.     With regards to osteoporosis:     BMD:   7/26/2021  FINDINGS:  Lumbar spine (L1-L4): BMD is 0.794 g/cm2, T-score is -2.3, and Z-score is -0.6.  Total hip: BMD is 0.740 g/cm2, T-score is -1.7, and Z-score is -0.6.  Femoral neck: BMD is 0.579 g/cm2, T-score is -2.4, and Z-score is -1.1.  Distal 1/3 radius: BMD is 0.539 g/cm2, T-score is -2.6, and Z-score is -0.1.  FRAX:  7.0% risk of a major osteoporotic fracture in the next 10 years.  2.0% risk of hip fracture in the next 10 years.  Impression:  *Osteoporosis based on T-score below -2.5  *Fracture risk is high  *Compared with previous DXA, BMD at the lumbar spine has remained stable, and the BMD at the total hip has declined by 4.5%.  RECOMMENDATIONS:  *Daily calcium intake 6598-7440 mg, dietary sources preferred; Vitamin D 5395-6002 IU daily.  *Weight bearing exercise and fall precautions.  *If primary hyperparathyroidism is confirmed, surgery would be indicated based on osteoporosis.  *Repeat BMD in 2 years    Medications:   Fosamax   Start Date:  2014 - took for about 2 weeks, but she had tongue swelling     Calcium intake: None.  Vit D intake: OTC Vit D3 1000iu daily     Weight bearing exercise: walking  Falls:   5/2023 - tripped over someone's foot    Fractures: Denies any previous fracture.   Significant height loss (>2 inches): Denies    Family history: Denies    Menopause: ~57y.o.  No HRT.    Tobacco Use: Denies  Alcohol Use: Rarely   Glucocorticoid History: Denies  Anticoagulant Use: Denies  GERD/PPI Use: Reflux, takes medication PRN  History of Malabsorption: Denies  Antiseizure Medications: Denies  History of Thyroid Disease: Denies  Kidney Stones/ Kidney Disease: Denies  Personal history of kidney stones: Denies  Family history of kidney stones: Denies  Family history of bone disease or fracture: Denies      Lab Results   Component Value Date    CALCIUM 10.2 01/04/2023    PHOS 2.9 11/22/2022     Vit D, 25-Hydroxy   Date Value Ref Range Status   11/22/2022 60 30 - 96 ng/mL Final     Comment:     Vitamin D deficiency.........<10 ng/mL                              Vitamin D insufficiency......10-29 ng/mL       Vitamin D sufficiency........> or equal to 30 ng/mL  Vitamin D toxicity............>100 ng/mL       With regards to the hypercalcemia or primary hyperparathyroidism:    Lab Results   Component Value Date    .8 (H) 11/22/2022    CALCIUM 10.2 01/04/2023    PHOS 2.9 11/22/2022     Lab Results   Component Value Date    ALBUMIN 4.1 01/04/2023          Latest Reference Range & Units 07/28/21 10:12   CREATININE, URINE (SEND OUT) 15.0 - 325.0 mg/dL 69.0   Calcium, Urine 0.0 - 15.0 mg/dL 19.8 (H)   Calcium, 24 Hr Urine 4 - 12 mg/Hr 11   Calcium, Urine (mg/spec) mg/Spec 257   Creatinine, Urinr (mg/spec) mg/Spec 897.0   Creatinine, Timed Urine 40.0 - 75.0 mg/Hr 37.4 (L)   (H): Data is abnormally high  (L): Data is abnormally low    Thyroid US  7/28/2022  FINDINGS:  Right lobe measures 4.2 x 0.8 x 1 cm.  Left lobe measures 3.4 x 1 x 1.1  cm.  Subcentimeter hypoechoic nodule in the left upper lobe may be solid measuring 0.4 cm.  Additional benign colloid cysts scattered in the left lobe.  Impression:  Subcentimeter thyroid nodules do not meet criteria for follow-up or FNA.  No suspicious nodule adjacent to the thyroid to suggest parathyroid adenoma in this patient with provided history of hyperparathyroidism.    Daily intake of calcium is: None  Daily intake of vitamin D:  OTC Vit D3 1000iu daily    BMD:  see above    Denies depression, muscle aches or pains.  Reports polyuria, constipation - inconsistent.   Denies fractures, height loss or kidney stones.  Denies history of renal disease.  Denies family history of hyperparathyroidism or calcium problems.  Denies HCTZ., lithium, or chlorthiadone use.      Review of Systems  As above      Visit Vitals  LMP 05/30/2000 (Approximate)       There is no height or weight on file to calculate BMI.    Lab Review:   Lab Results   Component Value Date    HGBA1C 5.7 (H) 07/25/2022    HGBA1C 5.7 (H) 03/07/2019    HGBA1C 5.7 01/18/2016       Lab Results   Component Value Date    CHOL 253 (H) 07/25/2022    HDL 67 07/25/2022    LDLCALC 167.6 (H) 07/25/2022    TRIG 92 07/25/2022    CHOLHDL 26.5 07/25/2022     Lab Results   Component Value Date     01/04/2023    K 4.6 01/04/2023     01/04/2023    CO2 26 01/04/2023    GLU 98 01/04/2023    BUN 12 01/04/2023    CREATININE 0.7 01/04/2023    CALCIUM 10.2 01/04/2023    PROT 7.3 01/04/2023    ALBUMIN 4.1 01/04/2023    BILITOT 0.4 01/04/2023    ALKPHOS 99 01/04/2023    AST 14 01/04/2023    ALT 9 (L) 01/04/2023    ANIONGAP 12 01/04/2023    ESTGFRAFRICA >60.0 07/25/2022    EGFRNONAA >60.0 07/25/2022    TSH 1.905 01/04/2023     Vit D, 25-Hydroxy   Date Value Ref Range Status   11/22/2022 60 30 - 96 ng/mL Final     Comment:     Vitamin D deficiency.........<10 ng/mL                              Vitamin D insufficiency......10-29 ng/mL       Vitamin D  sufficiency........> or equal to 30 ng/mL  Vitamin D toxicity............>100 ng/mL       Assessment and Plan     1. Hyperparathyroidism, unspecified  PTH, Intact    Renal Function Panel    Vitamin D    Hemoglobin A1C    DXA Bone Density Axial Skeleton 1 or more sites    NM Parathyroid Scan with SPECT Routine      2. Age-related osteoporosis without current pathological fracture  PTH, Intact    Renal Function Panel    Vitamin D    Hemoglobin A1C          Osteoporosis  -- Risks include menopause, ETOH,  +FH, hyperparathyroidism.  -- Reviewed basics of quantity versus quality.  -- Reassuring they are not fracturing.  -- Recommend:  -Pharmacological therapy is recommended for patients with osteopenia if the 10 year probability of a hip fracture is >3% and 10 year probability of other major osteoporotic fractures is >20%.  Treatment options and potential side effects discussed for PO bisphosphonates, reclast, Denosumab, and Teriparatide.   -Treatment: discussed-  Would benefit from parathyroid surgery if she localizes.  -Calcium and Vitamin D RDD provided.  -Exercise: recommended  -Fall precautions made in the home.  -Alerted that if dental work needs to be done it should be done prior to initiating therapy. Dental health: UTD.  -- Repeat DEXA scan 7/2023.    Hyperparathyroidism, unspecified  -- Intermittent hypercalcemia dating back to ~ 2017.  -- 7/2021 24 urine calcium and creatinine exclude FHH.  -- Repeat RFP, vitamin D, PTH.   -- Neck ultrasound to evaluate for parathyroid adenoma completed 7/2022.  -- Schedule nuclear medicine parathyroid scan and 4D CT for localization.  -- Discussed indications for surgery if primary hyperparathyroidism proven.  -- Avoid dehydration, excessive calcium supplementation and meds (HCTZ) that can worsen hypercalcemia.  -- Patient meets surgical criteria - she wants to proceed with localization studies but wants to talk with her family before saying yes to surgery.  -- Follow up  after results reviewed.        Follow up in about 6 months (around 1/3/2024).    I spent 30 minutes face-to-face with the patient, over half of the visit was spent on counseling and/or coordinating the care of the patient.    Counseling includes:  Diagnostic results, impressions, recommendations   Prognosis   Risk and benefits of management/treatment options   Instructions for management treatment and or follow-up   Importance of compliance with management   Risk factor reduction   Patient education

## 2023-07-03 ENCOUNTER — LAB VISIT (OUTPATIENT)
Dept: LAB | Facility: HOSPITAL | Age: 78
End: 2023-07-03
Payer: COMMERCIAL

## 2023-07-03 ENCOUNTER — PATIENT MESSAGE (OUTPATIENT)
Dept: ENDOCRINOLOGY | Facility: CLINIC | Age: 78
End: 2023-07-03

## 2023-07-03 ENCOUNTER — OFFICE VISIT (OUTPATIENT)
Dept: ENDOCRINOLOGY | Facility: CLINIC | Age: 78
End: 2023-07-03
Payer: COMMERCIAL

## 2023-07-03 ENCOUNTER — TELEPHONE (OUTPATIENT)
Dept: ENDOCRINOLOGY | Facility: CLINIC | Age: 78
End: 2023-07-03

## 2023-07-03 DIAGNOSIS — E21.3 HYPERPARATHYROIDISM, UNSPECIFIED: Primary | ICD-10-CM

## 2023-07-03 DIAGNOSIS — M81.0 AGE-RELATED OSTEOPOROSIS WITHOUT CURRENT PATHOLOGICAL FRACTURE: Chronic | ICD-10-CM

## 2023-07-03 DIAGNOSIS — E21.3 HYPERPARATHYROIDISM, UNSPECIFIED: ICD-10-CM

## 2023-07-03 LAB
25(OH)D3+25(OH)D2 SERPL-MCNC: 66 NG/ML (ref 30–96)
ALBUMIN SERPL BCP-MCNC: 3.6 G/DL (ref 3.5–5.2)
ANION GAP SERPL CALC-SCNC: 5 MMOL/L (ref 8–16)
BUN SERPL-MCNC: 9 MG/DL (ref 8–23)
CALCIUM SERPL-MCNC: 9.6 MG/DL (ref 8.7–10.5)
CHLORIDE SERPL-SCNC: 108 MMOL/L (ref 95–110)
CO2 SERPL-SCNC: 30 MMOL/L (ref 23–29)
CREAT SERPL-MCNC: 0.7 MG/DL (ref 0.5–1.4)
EST. GFR  (NO RACE VARIABLE): >60 ML/MIN/1.73 M^2
ESTIMATED AVG GLUCOSE: 114 MG/DL (ref 68–131)
GLUCOSE SERPL-MCNC: 102 MG/DL (ref 70–110)
HBA1C MFR BLD: 5.6 % (ref 4–5.6)
PHOSPHATE SERPL-MCNC: 3 MG/DL (ref 2.7–4.5)
POTASSIUM SERPL-SCNC: 4.8 MMOL/L (ref 3.5–5.1)
PTH-INTACT SERPL-MCNC: 98.4 PG/ML (ref 9–77)
SODIUM SERPL-SCNC: 143 MMOL/L (ref 136–145)

## 2023-07-03 PROCEDURE — 82306 VITAMIN D 25 HYDROXY: CPT | Performed by: NURSE PRACTITIONER

## 2023-07-03 PROCEDURE — 1160F PR REVIEW ALL MEDS BY PRESCRIBER/CLIN PHARMACIST DOCUMENTED: ICD-10-PCS | Mod: CPTII,95,, | Performed by: NURSE PRACTITIONER

## 2023-07-03 PROCEDURE — 83970 ASSAY OF PARATHORMONE: CPT | Performed by: NURSE PRACTITIONER

## 2023-07-03 PROCEDURE — 1160F RVW MEDS BY RX/DR IN RCRD: CPT | Mod: CPTII,95,, | Performed by: NURSE PRACTITIONER

## 2023-07-03 PROCEDURE — 99214 PR OFFICE/OUTPT VISIT, EST, LEVL IV, 30-39 MIN: ICD-10-PCS | Mod: 95,,, | Performed by: NURSE PRACTITIONER

## 2023-07-03 PROCEDURE — 1159F MED LIST DOCD IN RCRD: CPT | Mod: CPTII,95,, | Performed by: NURSE PRACTITIONER

## 2023-07-03 PROCEDURE — 80069 RENAL FUNCTION PANEL: CPT | Performed by: NURSE PRACTITIONER

## 2023-07-03 PROCEDURE — 83036 HEMOGLOBIN GLYCOSYLATED A1C: CPT | Performed by: NURSE PRACTITIONER

## 2023-07-03 PROCEDURE — 36415 COLL VENOUS BLD VENIPUNCTURE: CPT | Performed by: NURSE PRACTITIONER

## 2023-07-03 PROCEDURE — 99214 OFFICE O/P EST MOD 30 MIN: CPT | Mod: 95,,, | Performed by: NURSE PRACTITIONER

## 2023-07-03 PROCEDURE — 1159F PR MEDICATION LIST DOCUMENTED IN MEDICAL RECORD: ICD-10-PCS | Mod: CPTII,95,, | Performed by: NURSE PRACTITIONER

## 2023-07-03 NOTE — ASSESSMENT & PLAN NOTE
-- Intermittent hypercalcemia dating back to ~ 2017.  -- 7/2021 24 urine calcium and creatinine exclude FHH.  -- Repeat RFP, vitamin D, PTH.   -- Neck ultrasound to evaluate for parathyroid adenoma completed 7/2022.  -- Schedule nuclear medicine parathyroid scan and 4D CT for localization.  -- Discussed indications for surgery if primary hyperparathyroidism proven.  -- Avoid dehydration, excessive calcium supplementation and meds (HCTZ) that can worsen hypercalcemia.  -- Patient meets surgical criteria - she wants to proceed with localization studies but wants to talk with her family before saying yes to surgery.  -- Follow up after results reviewed.

## 2023-07-03 NOTE — ASSESSMENT & PLAN NOTE
-- Risks include menopause, ETOH,  +FH, hyperparathyroidism.  -- Reviewed basics of quantity versus quality.  -- Reassuring they are not fracturing.  -- Recommend:  -Pharmacological therapy is recommended for patients with osteopenia if the 10 year probability of a hip fracture is >3% and 10 year probability of other major osteoporotic fractures is >20%.  Treatment options and potential side effects discussed for PO bisphosphonates, reclast, Denosumab, and Teriparatide.   -Treatment: discussed-  Would benefit from parathyroid surgery if she localizes.  -Calcium and Vitamin D RDD provided.  -Exercise: recommended  -Fall precautions made in the home.  -Alerted that if dental work needs to be done it should be done prior to initiating therapy. Dental health: UTD.  -- Repeat DEXA scan 7/2023.

## 2023-07-03 NOTE — Clinical Note
Labs now Schedule BMD and NM parathyroid scan Orders Placed This Encounter     DXA Bone Density Axial Skeleton 1 or more sites     NM Parathyroid Scan with SPECT Routine     PTH, Intact     Renal Function Panel     Vitamin D     Hemoglobin A1C

## 2023-07-27 ENCOUNTER — HOSPITAL ENCOUNTER (OUTPATIENT)
Dept: RADIOLOGY | Facility: OTHER | Age: 78
Discharge: HOME | End: 2023-07-27
Attending: NURSE PRACTITIONER
Payer: COMMERCIAL

## 2023-07-27 DIAGNOSIS — E21.3 HYPERPARATHYROIDISM, UNSPECIFIED: ICD-10-CM

## 2023-07-27 PROCEDURE — 77080 DXA BONE DENSITY AXIAL SKELETON 1 OR MORE SITES: ICD-10-PCS | Mod: 26,,, | Performed by: RADIOLOGY

## 2023-07-27 PROCEDURE — 77080 DXA BONE DENSITY AXIAL: CPT | Mod: 26,,, | Performed by: RADIOLOGY

## 2023-07-27 PROCEDURE — 77080 DXA BONE DENSITY AXIAL: CPT | Mod: TC

## 2024-01-23 ENCOUNTER — TELEPHONE (OUTPATIENT)
Dept: SPORTS MEDICINE | Facility: CLINIC | Age: 79
End: 2024-01-23
Payer: MEDICARE

## 2024-01-23 NOTE — TELEPHONE ENCOUNTER
Called the patient to inform her that her appointment on 1/25/24.    Purpose of the call: to inform the patient that her appointment is canceled due to incorrect scheduling. The patient has been scheduled with the correct provider. Please refer to appointment for details. The patient can also call the office at [phone] to discuss.

## 2024-01-25 ENCOUNTER — OFFICE VISIT (OUTPATIENT)
Dept: SPORTS MEDICINE | Facility: CLINIC | Age: 79
End: 2024-01-25
Payer: COMMERCIAL

## 2024-01-25 ENCOUNTER — HOSPITAL ENCOUNTER (OUTPATIENT)
Dept: RADIOLOGY | Facility: HOSPITAL | Age: 79
Discharge: HOME | End: 2024-01-25
Attending: PHYSICIAN ASSISTANT
Payer: COMMERCIAL

## 2024-01-25 VITALS
SYSTOLIC BLOOD PRESSURE: 142 MMHG | DIASTOLIC BLOOD PRESSURE: 84 MMHG | HEIGHT: 66 IN | BODY MASS INDEX: 26.5 KG/M2 | HEART RATE: 76 BPM | WEIGHT: 164.88 LBS

## 2024-01-25 DIAGNOSIS — M25.562 PAIN IN BOTH KNEES, UNSPECIFIED CHRONICITY: Primary | ICD-10-CM

## 2024-01-25 DIAGNOSIS — M25.562 PAIN IN BOTH KNEES, UNSPECIFIED CHRONICITY: ICD-10-CM

## 2024-01-25 DIAGNOSIS — M17.12 PRIMARY OSTEOARTHRITIS OF LEFT KNEE: ICD-10-CM

## 2024-01-25 DIAGNOSIS — M25.461 EFFUSION OF RIGHT KNEE: ICD-10-CM

## 2024-01-25 DIAGNOSIS — M17.11 PRIMARY OSTEOARTHRITIS OF RIGHT KNEE: ICD-10-CM

## 2024-01-25 DIAGNOSIS — M25.561 PAIN IN BOTH KNEES, UNSPECIFIED CHRONICITY: ICD-10-CM

## 2024-01-25 DIAGNOSIS — M25.561 CHRONIC PAIN OF RIGHT KNEE: Primary | ICD-10-CM

## 2024-01-25 DIAGNOSIS — G89.29 CHRONIC PAIN OF RIGHT KNEE: Primary | ICD-10-CM

## 2024-01-25 DIAGNOSIS — M25.561 PAIN IN BOTH KNEES, UNSPECIFIED CHRONICITY: Primary | ICD-10-CM

## 2024-01-25 PROCEDURE — 1159F MED LIST DOCD IN RCRD: CPT | Mod: CPTII,S$GLB,, | Performed by: PHYSICIAN ASSISTANT

## 2024-01-25 PROCEDURE — 1125F AMNT PAIN NOTED PAIN PRSNT: CPT | Mod: CPTII,S$GLB,, | Performed by: PHYSICIAN ASSISTANT

## 2024-01-25 PROCEDURE — 3288F FALL RISK ASSESSMENT DOCD: CPT | Mod: CPTII,S$GLB,, | Performed by: PHYSICIAN ASSISTANT

## 2024-01-25 PROCEDURE — 73564 X-RAY EXAM KNEE 4 OR MORE: CPT | Mod: 26,50,, | Performed by: RADIOLOGY

## 2024-01-25 PROCEDURE — 3077F SYST BP >= 140 MM HG: CPT | Mod: CPTII,S$GLB,, | Performed by: PHYSICIAN ASSISTANT

## 2024-01-25 PROCEDURE — 20610 DRAIN/INJ JOINT/BURSA W/O US: CPT | Mod: RT,S$GLB,, | Performed by: PHYSICIAN ASSISTANT

## 2024-01-25 PROCEDURE — 99999 PR PBB SHADOW E&M-EST. PATIENT-LVL IV: CPT | Mod: PBBFAC,,, | Performed by: PHYSICIAN ASSISTANT

## 2024-01-25 PROCEDURE — 73564 X-RAY EXAM KNEE 4 OR MORE: CPT | Mod: TC,50,PN

## 2024-01-25 PROCEDURE — 3079F DIAST BP 80-89 MM HG: CPT | Mod: CPTII,S$GLB,, | Performed by: PHYSICIAN ASSISTANT

## 2024-01-25 PROCEDURE — 1100F PTFALLS ASSESS-DOCD GE2>/YR: CPT | Mod: CPTII,S$GLB,, | Performed by: PHYSICIAN ASSISTANT

## 2024-01-25 PROCEDURE — 99204 OFFICE O/P NEW MOD 45 MIN: CPT | Mod: 25,S$GLB,, | Performed by: PHYSICIAN ASSISTANT

## 2024-01-25 RX ADMIN — BUPIVACAINE HYDROCHLORIDE 7.5 MG: 2.5 INJECTION, SOLUTION INFILTRATION; PERINEURAL at 11:01

## 2024-01-25 RX ADMIN — TRIAMCINOLONE ACETONIDE 40 MG: 40 INJECTION, SUSPENSION INTRA-ARTICULAR; INTRAMUSCULAR at 11:01

## 2024-01-25 NOTE — PROGRESS NOTES
Subjective:     Chief Complaint: Kj Chávez is a 78 y.o. female who had concerns including Pain of the Left Knee and Pain of the Right Knee.    Patient who works as a  presents to clinic with bilateral knee pain x 1 year. R>L. Denies a specific CAMMY. Pain is located along the medial aspect of bilateral knees. Pain increases with prolonged sitting and at night. No previous history of injections or surgeries in her knees. She has been taking Ibuprofen, Advil, voltaren, and naproxen as needed with minimal relief of pain. She reports occasional buckling of her knees but denies mechanical symptoms. She also experiences intermittent effusions.     Hx of a fall onto her right knee in May 2023. She was seen by her PCP on 5/12/23, where radiographs were taken which were negative for fracture. No increase in her pain after the fall. She is here today to discuss treatment options.         Review of Systems   Constitutional: Negative. Negative for chills, fever, weight gain and weight loss.   HENT:  Negative for congestion and sore throat.    Eyes:  Negative for blurred vision and double vision.   Cardiovascular:  Negative for chest pain, leg swelling and palpitations.   Respiratory:  Negative for cough and shortness of breath.    Hematologic/Lymphatic: Does not bruise/bleed easily.   Skin:  Negative for itching, poor wound healing and rash.   Musculoskeletal:  Positive for joint pain. Negative for back pain, joint swelling, muscle weakness, myalgias and stiffness.   Gastrointestinal:  Negative for abdominal pain, constipation, diarrhea, nausea and vomiting.   Genitourinary: Negative.  Negative for frequency and hematuria.   Neurological:  Negative for dizziness, headaches, numbness, paresthesias and sensory change.   Psychiatric/Behavioral:  Negative for altered mental status and depression. The patient is not nervous/anxious.    Allergic/Immunologic: Negative for hives.                 Objective:      General: Kj is well-developed, well-nourished, appears stated age, in no acute distress, alert and oriented to time, place and person.     General    Vitals reviewed.  Constitutional: She is oriented to person, place, and time. She appears well-developed and well-nourished. No distress.   HENT:   Head: Normocephalic and atraumatic.   Eyes: EOM are normal.   Cardiovascular:  Normal rate and regular rhythm.            Pulmonary/Chest: Effort normal. No respiratory distress.   Neurological: She is alert and oriented to person, place, and time. She has normal reflexes. No cranial nerve deficit. Coordination normal.   Psychiatric: She has a normal mood and affect. Her behavior is normal. Judgment and thought content normal.     General Musculoskeletal Exam   Gait: abnormal and antalgic       Right Knee Exam     Inspection   Erythema: absent  Scars: absent  Swelling: absent  Effusion: present  Deformity: absent  Bruising: absent    Tenderness   The patient is tender to palpation of the medial joint line and patella.    Range of Motion   Extension:  0 normal   Flexion:  120 abnormal     Tests   Meniscus   Octavia:  Medial - negative Lateral - negative  Ligament Examination   Lachman: normal (-1 to 2mm)   PCL-Posterior Drawer: normal (0 to 2mm)     MCL - Valgus: normal (0 to 2mm)  LCL - Varus: normal  Pivot Shift: normal (Equal)  Reverse Pivot Shift: normal (Equal)  Posterolateral Corner: stable  Patella   Passive Patellar Tilt: neutral  Patellar Tracking: normal  Patellar Glide (quadrants): Lateral - 1   Medial - 2  Patellar Grind: positive    Other   Popliteal (Baker's) Cyst: absent  Sensation: normal    Left Knee Exam     Inspection   Erythema: absent  Scars: absent  Swelling: absent  Effusion: present  Deformity: absent  Bruising: absent    Tenderness   The patient is experiencing no tenderness.     Range of Motion   Extension:  0 normal   Flexion:  130 normal     Tests   Meniscus   Octavia:  Medial -  negative Lateral - negative  Stability   Lachman: normal (-1 to 2mm)   PCL-Posterior Drawer: normal (0 to 2mm)  MCL - Valgus: normal (0 to 2mm)  LCL - Varus: normal (0 to 2mm)  Pivot Shift: normal (Equal)  Reverse Pivot Shift: normal (Equal)  Posterolateral Corner: stable  Patella   Passive Patellar Tilt: neutral  Patellar Tracking: normal  Patellar Glide (Quadrants): Lateral - 1 Medial - 2  Patellar Grind: negative    Other   Popliteal (Baker's) Cyst: absent  Sensation: normal    Muscle Strength   Right Lower Extremity   Hip Abduction: 5/5   Quadriceps:  5/5   Hamstrin/5   Left Lower Extremity   Hip Abduction: 5/5   Quadriceps:  5/5   Hamstrin/5     Reflexes     Left Side  Achilles:  2+  Quadriceps:  2+    Right Side   Achilles:  2+  Quadriceps:  2+    Vascular Exam     Right Pulses  Dorsalis Pedis:      2+  Posterior Tibial:      2+        Left Pulses  Dorsalis Pedis:      2+  Posterior Tibial:      2+        RADIOGRAPHS:  Bilateral knees: 24  FINDINGS:  Right knee:     No fracture.  Narrowing of medial compartment without genu varum.  Preserved lateral compartment.  Narrowing of patellofemoral compartment, more so laterally.  Periarticular spurring about the tibiofemoral and patellofemoral compartments.  No suprapatellar bursal effusion or prepatellar soft tissue swelling.  Stable focus of soft tissue calcification adjacent to the inferior superior patella.  Right knee findings unchanged to earlier exam.     Left knee:     No fracture.  Narrowing of medial compartment without genu varum.  Preserved lateral compartment.  Narrowing of patellofemoral compartment more so laterally.  Periarticular spurring about tibiofemoral and patellofemoral compartments.  No suprapatellar bursal effusion or prepatellar soft tissue swelling.  Stable focus of soft tissue calcification seen involving the lower leg posteriorly.  Left knee findings unchanged to earlier exam.    Right knee:  FINDINGS:  No acute fractures.   No narrowing of tibiofemoral or patellofemoral articulations.  Minimal spurring lateral tibial spine and patellofemoral articulation.  No suprapatellar bursal effusion or prepatellar soft tissue swelling.    Assessment:     Encounter Diagnoses   Name Primary?    Chronic pain of right knee Yes    Primary osteoarthritis of left knee     Primary osteoarthritis of right knee     Effusion of right knee         Plan:   We have discussed a variety of treatment options including medications, injections, physical therapy and other alternative treatments. I also explained the indications, risks and benefits of surgery.  Patient's pain is refractory HEP, conservative management, and NSAIDs. Pt would like to proceed with CSI today in right knee and visco-supplementation in the future.    Medical Necessity for viscosupplementation use: After thorough evaluation of the patient, I have determined that viscosupplementation treatment is medically necessary. The patient has painful degenerative joint disease (DJD) of the knee(s) with failure of conservative treatments including lifestyle modifications and rehabilitation exercises. Oral analgesics including NSAIDs have not adequately controlled the patient's symptoms. There is radiographic evidence of Kellgren-Cayden grade II (or greater) osteoarthritic (OA) changes, or if lack of radiographic evidence, there is arthroscopic or other evidence of chondrosis of the knee(s).     I made the decision to obtain old records of the patient including previous notes and imaging. I independently reviewed and interpreted lab results today as well as prior imaging.  Reviewed with patient in detail.    1.Aspiration/Injection Procedure right knee    A time out was performed, including verification of patient ID, procedure, site and side, availability of information and equipment, review of safety issues, and agreement with consent, the procedure site was marked.    Aspiration:  After time out was  performed, the patient was prepped aseptically with povidone-iodine swabsticks. 10cc's of normal joint fluid was aspirated from the Superolateral  aspect of the right Knee Joint using a 22g x 1.5 needle in sterile fashion without complication.     Injection:  Following aspiration, a diagnostic and therapeutic injection of 1:3cc Kenalog/Marcaine was given under sterile technique in the supine position.     Kj Chávez had no adverse reactions to the medication. Pain decreased. She was instructed to apply ice to the joint for 20 minutes and avoid strenuous activities for 24-36 hours following the injection. She was warned of possible blood sugar and/or blood pressure changes during that time. Following that time, she can resume regular activities.    2. Pre-authorization placed for bilateral Synvisc-One injections. Information on this injection provided to patient.  3. Ice compress to the affected area 2-3x a day for 15-20 minutes as needed for pain management.  4. OTC NSAIDs as needed.  5.  RTC to see Millicent Marquez PA-C for bilateral Synvisc-One injections in 2 weeks.    All of the patient's questions were answered and the patient will contact us if they have any questions or concerns in the interim.        Patient questionnaires may have been collected.

## 2024-01-25 NOTE — PATIENT INSTRUCTIONS
DESCRIPTION  Synvisc-One (hylan G-F 20) is an elastoviscous high molecular weight fluid containing hylan A and hylan B polymers produced from chicken floyd. Hylans are derivatives of hyaluronan (sodium hyaluronate). Hylan G-F 20 is unique in that the hyaluronan is chemically cross linked. Hyaluronan is a long-chain polymer containing repeating disaccharide units of Sz-mhdrgeqmbsj-O-acetylglucosamine.     INDICATIONS FOR USE  Synvisc-One is indicated for the treatment of pain in osteoarthritis (OA) of the knee in patients who have failed to respond adequately to conservative nonpharmacologic therapy and simple analgesics, e.g., acetaminophen.     Who is a candidate for Synvisc-One  Patients with knee osteoarthritis, who have tried diet, exercise and over-the-counter pain medication but still have pain, should talk to their doctor to see if Synvisc-One is right for them.     How Synvisc-One is administered  Synvisc-One is a single injection. It's a simple, in-office procedure that only takes a few minutes.     What you can expect following a Synvisc-One knee injection Synvisc-One can provide up to six months of osteoarthritis knee pain relief. Everyone responds differently, but in a medical study* patients experienced relief starting one month after the injection.     After the injection, you can resume normal day-to-day activities, but you should avoid any strenuous activities for about 48 hours.     Contraindication  Do not administer to patients with known hypersensitivity (allergy) to hyaluronan (sodium hyaluronate) preparations.   Do not inject Synvisc-One in the knees of patients having knee joint infections or skin diseases or infections in the area of theinjection site.    What are possible side effects?  Synvisc may occur short-term pain, swelling at the injection site and / or the appearance of synovial exudate after injection. In some cases, exudation may be significant and cause more prolonged pain.      Important Safety Information  Before trying Synvisc-One or SYNVISC, tell your doctor if you are allergic to products from birds - such as feathers, eggs or poultry - or if your leg is swollen or infected. Synvisc-One and SYNVISC are only for injection into the knee, performed by a doctor or other qualified health care professional. Synvisc-One and SYNVISC have not been tested to show pain relief in joints other than the knee. Talk to your doctor before resuming strenuous weight-bearing activities after treatment. Synvisc-One and SYNVISC have not been tested in children, pregnant women or women who are nursing. You should tell your doctor if you think you are pregnant or if you are nursing a child. The side effects most commonly seen when Synvisc-One or SYNVISC is injected into the knee were pain, swelling and/or fluid buildup in or around the knee. Cases where the swelling is extensive or painful should be discussed with your doctor. Allergic reactions such as rash and hives have been reported rarely.

## 2024-01-26 RX ORDER — TRIAMCINOLONE ACETONIDE 40 MG/ML
40 INJECTION, SUSPENSION INTRA-ARTICULAR; INTRAMUSCULAR
Status: COMPLETED | OUTPATIENT
Start: 2024-01-26 | End: 2024-01-25

## 2024-01-26 RX ORDER — BUPIVACAINE HYDROCHLORIDE 2.5 MG/ML
3 INJECTION, SOLUTION INFILTRATION; PERINEURAL
Status: COMPLETED | OUTPATIENT
Start: 2024-01-26 | End: 2024-01-25

## 2024-01-30 ENCOUNTER — TELEPHONE (OUTPATIENT)
Dept: SPORTS MEDICINE | Facility: CLINIC | Age: 79
End: 2024-01-30
Payer: MEDICARE

## 2024-01-30 NOTE — TELEPHONE ENCOUNTER
Spoke to the patient in regard to her visco knee injection. While on the call I informed the patient that our office received notice that SynviscOne injection is not a preferred brand. The patient was informed of the preferred brand Duolane and also informed that it's a single injection. The patient stated that she would like to continue with Durolane.

## 2024-01-31 DIAGNOSIS — M17.12 PRIMARY OSTEOARTHRITIS OF LEFT KNEE: Primary | ICD-10-CM

## 2024-01-31 DIAGNOSIS — M17.11 PRIMARY OSTEOARTHRITIS OF RIGHT KNEE: ICD-10-CM

## 2024-02-08 ENCOUNTER — OFFICE VISIT (OUTPATIENT)
Dept: SPORTS MEDICINE | Facility: CLINIC | Age: 79
End: 2024-02-08
Payer: COMMERCIAL

## 2024-02-08 VITALS
HEART RATE: 75 BPM | DIASTOLIC BLOOD PRESSURE: 84 MMHG | WEIGHT: 161.81 LBS | SYSTOLIC BLOOD PRESSURE: 152 MMHG | HEIGHT: 66 IN | BODY MASS INDEX: 26.01 KG/M2

## 2024-02-08 DIAGNOSIS — M17.11 PRIMARY OSTEOARTHRITIS OF RIGHT KNEE: ICD-10-CM

## 2024-02-08 DIAGNOSIS — M17.12 PRIMARY OSTEOARTHRITIS OF LEFT KNEE: Primary | ICD-10-CM

## 2024-02-08 PROCEDURE — 20610 DRAIN/INJ JOINT/BURSA W/O US: CPT | Mod: 50,S$GLB,, | Performed by: PHYSICIAN ASSISTANT

## 2024-02-08 PROCEDURE — 99499 UNLISTED E&M SERVICE: CPT | Mod: S$GLB,,, | Performed by: PHYSICIAN ASSISTANT

## 2024-02-08 PROCEDURE — 99999 PR PBB SHADOW E&M-EST. PATIENT-LVL III: CPT | Mod: PBBFAC,,, | Performed by: PHYSICIAN ASSISTANT

## 2024-02-08 NOTE — PROGRESS NOTES
Patient is here for follow up of bilateral knee arthritis. Pt is requesting bilateral knee Durolane injections.  PMFH reviewed per encounter record. Has failed other conservative modalities including NSAIDS, activity modification, weight loss.    The prior shot was tolerated well.    PHYSICAL EXAMINATION:     General: The patient is alert and oriented x 3. Mood is pleasant.   Observation of ears, eyes and nose reveals no gross abnormalities. No   labored breathing observed.     No signs of infection or adverse reaction to knee.    PROCEDURE NOTE:  Aspiration/Injection Procedure right knee    A time out was performed, including verification of patient ID, procedure, site and side, availability of information and equipment, review of safety issues, and agreement with consent, the procedure site was marked.    Aspiration:  After time out was performed, the patient was prepped aseptically with povidone-iodine swabsticks. 3cc's of normal joint fluid was aspirated from the Superolateral  aspect of the right Knee Joint using a 22g x 1.5 needle in sterile fashion without complication.     Injection:  Following aspiration, a diagnostic and therapeutic injection of 3cc Durolane was given under sterile technique in the supine position.     Kj Chávez had no adverse reactions to the medication. Pain decreased. She was instructed to apply ice to the joint for 20 minutes and avoid strenuous activities for 24-36 hours following the injection. She was warned of possible blood sugar and/or blood pressure changes during that time. Following that time, she can resume regular activities.    Injection Procedure left knee  A time out was performed, including verification of patient ID, procedure, site and side, availability of information and equipment, review of safety issues, and agreement with consent, the procedure site was marked.    After time out was performed, the patient was prepped aseptically with povidone-iodine  swabsticks. A diagnostic and therapeutic injection of 3cc Durolane was given under sterile technique using a 22g x 1.5 needle from the Superolateral  aspect of the left Knee Joint in the supine position.      Kj Chávez had no adverse reactions to the medication. Pain decreased. She was instructed to apply ice to the joint for 20 minutes and avoid strenuous activities for 24-36 hours following the injection. She was warned of possible blood sugar and/or blood pressure changes during that time. Following that time, she can resume regular activities.    She was reminded to call the clinic immediately for any adverse side effects as explained in clinic today.    RTC in 3 months with Millicent Marquez PA-C for follow up.  All questions were answered, pt will contact us for questions or concerns in the interim.

## 2024-02-08 NOTE — LETTER
Patient: Kj Chávez   YOB: 1945   Clinic Number: [id number]   Today's Date: February 8, 2024        Certificate to Return to Work     Kj Chávez was seen by Millicent Marquez PA-C on 2/8/2024.        Please excuse Kj Chávez from any work missed on 2/8/2024. Kj Chávez can return to work on 2/8/2024.        If you have any questions or concerns, please feel free to contact the office at [phone].    Thank you.    Millicent Marquez PA-C        Signature:

## 2024-04-29 ENCOUNTER — OFFICE VISIT (OUTPATIENT)
Dept: SPORTS MEDICINE | Facility: CLINIC | Age: 79
End: 2024-04-29
Payer: MEDICARE

## 2024-04-29 VITALS
DIASTOLIC BLOOD PRESSURE: 88 MMHG | HEART RATE: 60 BPM | SYSTOLIC BLOOD PRESSURE: 156 MMHG | HEIGHT: 66 IN | WEIGHT: 157.88 LBS | BODY MASS INDEX: 25.37 KG/M2

## 2024-04-29 DIAGNOSIS — M17.11 PRIMARY OSTEOARTHRITIS OF RIGHT KNEE: ICD-10-CM

## 2024-04-29 DIAGNOSIS — M17.12 PRIMARY OSTEOARTHRITIS OF LEFT KNEE: Primary | ICD-10-CM

## 2024-04-29 DIAGNOSIS — M25.562 PAIN IN BOTH KNEES, UNSPECIFIED CHRONICITY: ICD-10-CM

## 2024-04-29 DIAGNOSIS — M25.561 PAIN IN BOTH KNEES, UNSPECIFIED CHRONICITY: ICD-10-CM

## 2024-04-29 PROCEDURE — 1159F MED LIST DOCD IN RCRD: CPT | Mod: CPTII,S$GLB,, | Performed by: PHYSICIAN ASSISTANT

## 2024-04-29 PROCEDURE — 3288F FALL RISK ASSESSMENT DOCD: CPT | Mod: CPTII,S$GLB,, | Performed by: PHYSICIAN ASSISTANT

## 2024-04-29 PROCEDURE — 3079F DIAST BP 80-89 MM HG: CPT | Mod: CPTII,S$GLB,, | Performed by: PHYSICIAN ASSISTANT

## 2024-04-29 PROCEDURE — 1101F PT FALLS ASSESS-DOCD LE1/YR: CPT | Mod: CPTII,S$GLB,, | Performed by: PHYSICIAN ASSISTANT

## 2024-04-29 PROCEDURE — 99214 OFFICE O/P EST MOD 30 MIN: CPT | Mod: S$GLB,,, | Performed by: PHYSICIAN ASSISTANT

## 2024-04-29 PROCEDURE — 1125F AMNT PAIN NOTED PAIN PRSNT: CPT | Mod: CPTII,S$GLB,, | Performed by: PHYSICIAN ASSISTANT

## 2024-04-29 PROCEDURE — 3077F SYST BP >= 140 MM HG: CPT | Mod: CPTII,S$GLB,, | Performed by: PHYSICIAN ASSISTANT

## 2024-04-29 PROCEDURE — 1160F RVW MEDS BY RX/DR IN RCRD: CPT | Mod: CPTII,S$GLB,, | Performed by: PHYSICIAN ASSISTANT

## 2024-04-29 PROCEDURE — 99999 PR PBB SHADOW E&M-EST. PATIENT-LVL III: CPT | Mod: PBBFAC,,, | Performed by: PHYSICIAN ASSISTANT

## 2024-04-29 RX ORDER — DICLOFENAC SODIUM 75 MG/1
75 TABLET, DELAYED RELEASE ORAL 2 TIMES DAILY
Qty: 60 TABLET | Refills: 1 | Status: SHIPPED | OUTPATIENT
Start: 2024-04-29

## 2024-04-29 NOTE — PROGRESS NOTES
Subjective:     Chief Complaint: Kj Chávez is a 78 y.o. female who had concerns including Pain and Swelling of the Right Knee.    Interval Hx: Patient presents for follow-up s/p viscosupplementation of bilateral knee with Millicent Marquez PA-C on 2/8/2024. Reports symptoms showed no change with viscosupplementation in right knee, and previously only one day relief with CSI in right knee. Worse with any activity to the right knee, including sit-to-stand and going down stairs, similar to previously documented symptoms. Denies new injury or trauma.  Patient presents to discuss continued treatment options.  She has not tried physical therapy and currently does not take an anti-inflammatory.      Previous HPI: Patient who works as a  presents to clinic with bilateral knee pain x 1 year. R>L. Denies a specific CAMMY. Pain is located along the medial aspect of bilateral knees. Pain increases with prolonged sitting and at night. No previous history of injections or surgeries in her knees. She has been taking Ibuprofen, Advil, voltaren, and naproxen as needed with minimal relief of pain. She reports occasional buckling of her knees but denies mechanical symptoms. She also experiences intermittent effusions.     Hx of a fall onto her right knee in May 2023. She was seen by her PCP on 5/12/23, where radiographs were taken which were negative for fracture. No increase in her pain after the fall. She is here today to discuss treatment options.         Review of Systems   Constitutional: Negative. Negative for chills, fever, weight gain and weight loss.   HENT:  Negative for congestion and sore throat.    Eyes:  Negative for blurred vision and double vision.   Cardiovascular:  Negative for chest pain, leg swelling and palpitations.   Respiratory:  Negative for cough and shortness of breath.    Hematologic/Lymphatic: Does not bruise/bleed easily.   Skin:  Negative for itching, poor wound healing and rash.    Musculoskeletal:  Positive for joint pain and joint swelling. Negative for back pain, muscle weakness, myalgias and stiffness.   Gastrointestinal:  Negative for abdominal pain, constipation, diarrhea, nausea and vomiting.   Genitourinary: Negative.  Negative for frequency and hematuria.   Neurological:  Negative for dizziness, headaches, numbness, paresthesias and sensory change.   Psychiatric/Behavioral:  Negative for altered mental status and depression. The patient is not nervous/anxious.    Allergic/Immunologic: Negative for hives.       Pain Related Questions  Over the past 3 days, what was your highest pain level?: 10  Over the past 3 days, what was your lowest pain level? : 10    Other  How many nights a week are you awakened by your affected body part?: 7  Was the patient's HEIGHT measured or patient reported?: Patient Reported  Was the patient's WEIGHT measured or patient reported?: Measured    Objective:     General: Kj is well-developed, well-nourished, appears stated age, in no acute distress, alert and oriented to time, place and person.     General    Vitals reviewed.  Constitutional: She is oriented to person, place, and time. She appears well-developed and well-nourished. No distress.   HENT:   Head: Normocephalic and atraumatic.   Eyes: EOM are normal.   Cardiovascular:  Normal rate and regular rhythm.            Pulmonary/Chest: Effort normal. No respiratory distress.   Neurological: She is alert and oriented to person, place, and time. She has normal reflexes. No cranial nerve deficit. Coordination normal.   Psychiatric: She has a normal mood and affect. Her behavior is normal. Judgment and thought content normal.     General Musculoskeletal Exam   Gait: abnormal and antalgic       Right Knee Exam     Inspection   Erythema: absent  Scars: absent  Swelling: absent  Effusion: present (Mild)  Deformity: absent  Bruising: absent    Tenderness   The patient is tender to palpation of the medial  joint line and patella.    Crepitus   The patient has crepitus of the patella.    Range of Motion   Extension:  0 normal   Flexion:  120 abnormal     Tests   Meniscus   Octavia:  Medial - negative Lateral - negative  Ligament Examination   Lachman: normal (-1 to 2mm)   PCL-Posterior Drawer: normal (0 to 2mm)     MCL - Valgus: normal (0 to 2mm)  LCL - Varus: normal  Pivot Shift: normal (Equal)  Reverse Pivot Shift: normal (Equal)  Posterolateral Corner: stable  Patella   Passive Patellar Tilt: neutral  Patellar Tracking: normal  Patellar Glide (quadrants): Lateral - 1   Medial - 2  Patellar Grind: positive    Other   Popliteal (Baker's) Cyst: absent  Sensation: normal    Left Knee Exam     Inspection   Erythema: absent  Scars: absent  Swelling: absent  Effusion: absent  Deformity: absent  Bruising: absent    Tenderness   The patient is experiencing no tenderness.     Range of Motion   Extension:  0 normal   Flexion:  130 normal     Tests   Meniscus   Octavia:  Medial - negative Lateral - negative  Stability   Lachman: normal (-1 to 2mm)   PCL-Posterior Drawer: normal (0 to 2mm)  MCL - Valgus: normal (0 to 2mm)  LCL - Varus: normal (0 to 2mm)  Pivot Shift: normal (Equal)  Reverse Pivot Shift: normal (Equal)  Posterolateral Corner: stable  Patella   Passive Patellar Tilt: neutral  Patellar Tracking: normal  Patellar Glide (Quadrants): Lateral - 1 Medial - 2  Patellar Grind: negative    Other   Popliteal (Baker's) Cyst: absent  Sensation: normal    Muscle Strength   Right Lower Extremity   Hip Abduction: 5/5   Quadriceps:  4/5   Hamstrin/5   Left Lower Extremity   Hip Abduction: 5/5   Quadriceps:  5/5   Hamstrin/5     Reflexes     Left Side  Achilles:  2+  Quadriceps:  2+    Right Side   Achilles:  2+  Quadriceps:  2+    Vascular Exam     Right Pulses  Dorsalis Pedis:      2+  Posterior Tibial:      2+        Left Pulses  Dorsalis Pedis:      2+  Posterior Tibial:      2+        RADIOGRAPHS:  Bilateral  knees: 1/25/24  FINDINGS:  Right knee:     No fracture.  Narrowing of medial compartment without genu varum.  Preserved lateral compartment.  Narrowing of patellofemoral compartment, more so laterally.  Periarticular spurring about the tibiofemoral and patellofemoral compartments.  No suprapatellar bursal effusion or prepatellar soft tissue swelling.  Stable focus of soft tissue calcification adjacent to the inferior superior patella.  Right knee findings unchanged to earlier exam.     Left knee:     No fracture.  Narrowing of medial compartment without genu varum.  Preserved lateral compartment.  Narrowing of patellofemoral compartment more so laterally.  Periarticular spurring about tibiofemoral and patellofemoral compartments.  No suprapatellar bursal effusion or prepatellar soft tissue swelling.  Stable focus of soft tissue calcification seen involving the lower leg posteriorly.  Left knee findings unchanged to earlier exam.    Right knee:  FINDINGS:  No acute fractures.  No narrowing of tibiofemoral or patellofemoral articulations.  Minimal spurring lateral tibial spine and patellofemoral articulation.  No suprapatellar bursal effusion or prepatellar soft tissue swelling.    Assessment:     Encounter Diagnoses   Name Primary?    Primary osteoarthritis of left knee Yes    Primary osteoarthritis of right knee     Pain in both knees, unspecified chronicity           Plan:   We have discussed a variety of treatment options including medications, injections, physical therapy and other alternative treatments. I also explained the indications, risks and benefits of surgery. Given the patients hx and examination today, I believe she would benefit from physical therapy. Pt agrees and would like to proceed with physical therapy.  Given she did not receive any relief with CSI for adequate time, do not recommend repeat CSI today.    I made the decision to obtain old records of the patient including previous notes and  imaging. I independently reviewed and interpreted lab results today as well as prior imaging.      1. Diclofenac 75 mg BID PRN. Patient understands to take with food and/or OTC prilosec to decrease GI side effects.  2. Ambulatory referral to physical therapy for patellofemoral strengthening and conditioning.  3. Ice compress to the affected area 2-3x a day for 15-20 minutes as needed for pain management.  4. RTC to see Rustam Puentes PA-C in 3 months for follow-up.      All of the patient's questions were answered and the patient will contact us if they have any questions or concerns in the interim.      Patient questionnaires may have been collected.

## 2024-05-09 ENCOUNTER — OFFICE VISIT (OUTPATIENT)
Dept: SPORTS MEDICINE | Facility: CLINIC | Age: 79
End: 2024-05-09
Payer: COMMERCIAL

## 2024-05-09 VITALS
WEIGHT: 161.63 LBS | SYSTOLIC BLOOD PRESSURE: 155 MMHG | DIASTOLIC BLOOD PRESSURE: 82 MMHG | HEIGHT: 66 IN | HEART RATE: 65 BPM | BODY MASS INDEX: 25.97 KG/M2

## 2024-05-09 DIAGNOSIS — M25.561 CHRONIC PAIN OF RIGHT KNEE: ICD-10-CM

## 2024-05-09 DIAGNOSIS — M17.11 PRIMARY OSTEOARTHRITIS OF RIGHT KNEE: Primary | ICD-10-CM

## 2024-05-09 DIAGNOSIS — G89.29 CHRONIC PAIN OF RIGHT KNEE: ICD-10-CM

## 2024-05-09 PROCEDURE — 99214 OFFICE O/P EST MOD 30 MIN: CPT | Mod: S$GLB,,, | Performed by: PHYSICIAN ASSISTANT

## 2024-05-09 PROCEDURE — 3079F DIAST BP 80-89 MM HG: CPT | Mod: CPTII,S$GLB,, | Performed by: PHYSICIAN ASSISTANT

## 2024-05-09 PROCEDURE — 3077F SYST BP >= 140 MM HG: CPT | Mod: CPTII,S$GLB,, | Performed by: PHYSICIAN ASSISTANT

## 2024-05-09 PROCEDURE — 1125F AMNT PAIN NOTED PAIN PRSNT: CPT | Mod: CPTII,S$GLB,, | Performed by: PHYSICIAN ASSISTANT

## 2024-05-09 PROCEDURE — 1160F RVW MEDS BY RX/DR IN RCRD: CPT | Mod: CPTII,S$GLB,, | Performed by: PHYSICIAN ASSISTANT

## 2024-05-09 PROCEDURE — 99999 PR PBB SHADOW E&M-EST. PATIENT-LVL IV: CPT | Mod: PBBFAC,,, | Performed by: PHYSICIAN ASSISTANT

## 2024-05-09 PROCEDURE — 1101F PT FALLS ASSESS-DOCD LE1/YR: CPT | Mod: CPTII,S$GLB,, | Performed by: PHYSICIAN ASSISTANT

## 2024-05-09 PROCEDURE — 3288F FALL RISK ASSESSMENT DOCD: CPT | Mod: CPTII,S$GLB,, | Performed by: PHYSICIAN ASSISTANT

## 2024-05-09 PROCEDURE — 1159F MED LIST DOCD IN RCRD: CPT | Mod: CPTII,S$GLB,, | Performed by: PHYSICIAN ASSISTANT

## 2024-05-09 NOTE — PROGRESS NOTES
Subjective:     Chief Complaint: Kj Chávez is a 78 y.o. female who had concerns including Follow-up of the Left Knee and Follow-up of the Right Knee.    Patient presents for follow-up s/p viscosupplementation of bilateral knee with myself on 2/8/2024. Reports symptoms showed no change with viscosupplementation in right knee, and previously only one day relief with CSI in right knee. Worse with any activity to the right knee, including sit-to-stand and going down stairs, similar to previously documented symptoms. Denies new injury or trauma.  Patient presents to discuss continued treatment options.  She has not tried physical therapy and was started on diclofenac a few weeks ago. Pain in right knee is 10/10. She is a  and has to climb stairs for work. She would like to discuss other conservative treatment options. She is not interested in discussing surgery at this time.      Previous HPI: Patient who works as a  presents to clinic with bilateral knee pain x 1 year. R>L. Denies a specific CAMMY. Pain is located along the medial aspect of bilateral knees. Pain increases with prolonged sitting and at night. No previous history of injections or surgeries in her knees. She has been taking Ibuprofen, Advil, voltaren, and naproxen as needed with minimal relief of pain. She reports occasional buckling of her knees but denies mechanical symptoms. She also experiences intermittent effusions.     Hx of a fall onto her right knee in May 2023. She was seen by her PCP on 5/12/23, where radiographs were taken which were negative for fracture. No increase in her pain after the fall. She is here today to discuss treatment options.         Review of Systems   Constitutional: Negative. Negative for chills, fever, weight gain and weight loss.   HENT:  Negative for congestion and sore throat.    Eyes:  Negative for blurred vision and double vision.   Cardiovascular:  Negative for chest pain, leg  swelling and palpitations.   Respiratory:  Negative for cough and shortness of breath.    Hematologic/Lymphatic: Does not bruise/bleed easily.   Skin:  Negative for itching, poor wound healing and rash.   Musculoskeletal:  Positive for joint pain and joint swelling. Negative for back pain, muscle weakness, myalgias and stiffness.   Gastrointestinal:  Negative for abdominal pain, constipation, diarrhea, nausea and vomiting.   Genitourinary: Negative.  Negative for frequency and hematuria.   Neurological:  Negative for dizziness, headaches, numbness, paresthesias and sensory change.   Psychiatric/Behavioral:  Negative for altered mental status and depression. The patient is not nervous/anxious.    Allergic/Immunologic: Negative for hives.                 Objective:     General: Kj is well-developed, well-nourished, appears stated age, in no acute distress, alert and oriented to time, place and person.     General    Vitals reviewed.  Constitutional: She is oriented to person, place, and time. She appears well-developed and well-nourished. No distress.   HENT:   Head: Normocephalic and atraumatic.   Eyes: EOM are normal.   Cardiovascular:  Normal rate and regular rhythm.            Pulmonary/Chest: Effort normal. No respiratory distress.   Neurological: She is alert and oriented to person, place, and time. She has normal reflexes. No cranial nerve deficit. Coordination normal.   Psychiatric: She has a normal mood and affect. Her behavior is normal. Judgment and thought content normal.     General Musculoskeletal Exam   Gait: abnormal and antalgic       Right Knee Exam     Inspection   Erythema: absent  Scars: absent  Swelling: absent  Effusion: present (Mild)  Deformity: absent  Bruising: absent    Tenderness   The patient is tender to palpation of the medial joint line and patella.    Crepitus   The patient has crepitus of the patella.    Range of Motion   Extension:  0 normal   Flexion:  120 abnormal     Tests    Meniscus   Octavia:  Medial - negative Lateral - negative  Ligament Examination   Lachman: normal (-1 to 2mm)   PCL-Posterior Drawer: normal (0 to 2mm)     MCL - Valgus: normal (0 to 2mm)  LCL - Varus: normal  Pivot Shift: normal (Equal)  Reverse Pivot Shift: normal (Equal)  Posterolateral Corner: stable  Patella   Passive Patellar Tilt: neutral  Patellar Tracking: normal  Patellar Glide (quadrants): Lateral - 1   Medial - 2  Patellar Grind: positive    Other   Popliteal (Baker's) Cyst: absent  Sensation: normal    Left Knee Exam     Inspection   Erythema: absent  Scars: absent  Swelling: absent  Effusion: absent  Deformity: absent  Bruising: absent    Tenderness   The patient is experiencing no tenderness.     Range of Motion   Extension:  0 normal   Flexion:  130 normal     Tests   Meniscus   Octavia:  Medial - negative Lateral - negative  Stability   Lachman: normal (-1 to 2mm)   PCL-Posterior Drawer: normal (0 to 2mm)  MCL - Valgus: normal (0 to 2mm)  LCL - Varus: normal (0 to 2mm)  Pivot Shift: normal (Equal)  Reverse Pivot Shift: normal (Equal)  Posterolateral Corner: stable  Patella   Passive Patellar Tilt: neutral  Patellar Tracking: normal  Patellar Glide (Quadrants): Lateral - 1 Medial - 2  Patellar Grind: negative    Other   Popliteal (Baker's) Cyst: absent  Sensation: normal    Muscle Strength   Right Lower Extremity   Hip Abduction: 5/5   Quadriceps:  4/5   Hamstrin/5   Left Lower Extremity   Hip Abduction: 5/5   Quadriceps:  5/5   Hamstrin/5     Reflexes     Left Side  Achilles:  2+  Quadriceps:  2+    Right Side   Achilles:  2+  Quadriceps:  2+    Vascular Exam     Right Pulses  Dorsalis Pedis:      2+  Posterior Tibial:      2+        Left Pulses  Dorsalis Pedis:      2+  Posterior Tibial:      2+        RADIOGRAPHS:  Bilateral knees: 24  FINDINGS:  Right knee:     No fracture.  Narrowing of medial compartment without genu varum.  Preserved lateral compartment.  Narrowing of  patellofemoral compartment, more so laterally.  Periarticular spurring about the tibiofemoral and patellofemoral compartments.  No suprapatellar bursal effusion or prepatellar soft tissue swelling.  Stable focus of soft tissue calcification adjacent to the inferior superior patella.  Right knee findings unchanged to earlier exam.     Left knee:  No fracture.  Narrowing of medial compartment without genu varum.  Preserved lateral compartment.  Narrowing of patellofemoral compartment more so laterally.  Periarticular spurring about tibiofemoral and patellofemoral compartments.  No suprapatellar bursal effusion or prepatellar soft tissue swelling.  Stable focus of soft tissue calcification seen involving the lower leg posteriorly.  Left knee findings unchanged to earlier exam.      Assessment:     Encounter Diagnoses   Name Primary?    Primary osteoarthritis of right knee Yes    Chronic pain of right knee           Plan:   We have discussed a variety of treatment options including medications, injections, physical therapy and other alternative treatments. I also explained the indications, risks and benefits of surgery. Given the patients hx and examination today, I believe she would benefit from physical therapy and Iovera. Pt agrees and would like to proceed with this. Given she did not receive any relief with CSI for adequate time, do not recommend repeat CSI today. Information provided on Iovera as well as ortho-biologics. She is interested in the Iovera procedure.    I made the decision to obtain old records of the patient including previous notes and imaging. I independently reviewed and interpreted lab results today as well as prior imaging.  Reviewed with patient in detail.    1. Diclofenac 75 mg BID PRN. Patient understands to take with food and/or OTC prilosec to decrease GI side effects.  2. Ambulatory referral to physical therapy for patellofemoral strengthening and conditioning. [facility].   3. Ice compress  to the affected area 2-3x a day for 15-20 minutes as needed for pain management.  4. Authorization placed for right knee Iovera procedure. Information provided.  5. RTC to see Millicent Marquez PA-C in 3 weeks for right knee Iovera.      All of the patient's questions were answered and the patient will contact us if they have any questions or concerns in the interim.      Patient questionnaires may have been collected.

## 2024-05-28 DIAGNOSIS — Z00.00 ENCOUNTER FOR MEDICARE ANNUAL WELLNESS EXAM: ICD-10-CM

## 2024-06-05 ENCOUNTER — PROCEDURE VISIT (OUTPATIENT)
Dept: SPORTS MEDICINE | Facility: CLINIC | Age: 79
End: 2024-06-05
Payer: MEDICARE

## 2024-06-05 ENCOUNTER — TELEPHONE (OUTPATIENT)
Dept: SPORTS MEDICINE | Facility: CLINIC | Age: 79
End: 2024-06-05

## 2024-06-05 VITALS
DIASTOLIC BLOOD PRESSURE: 77 MMHG | WEIGHT: 161.69 LBS | HEIGHT: 66 IN | HEART RATE: 66 BPM | BODY MASS INDEX: 25.98 KG/M2 | SYSTOLIC BLOOD PRESSURE: 156 MMHG

## 2024-06-05 DIAGNOSIS — M25.561 CHRONIC PAIN OF RIGHT KNEE: ICD-10-CM

## 2024-06-05 DIAGNOSIS — G89.29 CHRONIC PAIN OF RIGHT KNEE: ICD-10-CM

## 2024-06-05 PROCEDURE — 99499 UNLISTED E&M SERVICE: CPT | Mod: S$GLB,,, | Performed by: PHYSICIAN ASSISTANT

## 2024-06-05 PROCEDURE — 64640 INJECTION TREATMENT OF NERVE: CPT | Mod: RT,S$GLB,, | Performed by: PHYSICIAN ASSISTANT

## 2024-06-05 NOTE — PROCEDURES
CC: right knee pain    71 y.o. Female presents today for Iovera procedure for right knee pain.    INFORMED CONSENT: I verify that I personally obtained Kj GALLEGOS Kyler's consent which was signed and uploaded into [employer].     PAIN SCORE:  Pre-procedure:  7-8/10  Gait: Antalgic    Inspection/Palpation:   +Skin warm and dry without open wounds or erythema  -Rubor   -Calor    Procedure Note Iovera:     DATE OF PROCEDURE:  6/5/24     PREOPERATIVE DIAGNOSIS: right knee pain.      POSTOPERATIVE DIAGNOSIS: right knee pain.      PROCEDURE: Iovera treatment of anterior femoral cutaneous nerve, Lateral femoral cut nerve, and superior and inferior branches of infrapatellar saphenous nerve using at least 4+ different punctures to treat all 4 nerves. (cpt 64640 x4)     COMPLICATIONS: None.      IMPLANTS:  None     ESTIMATED BLOOD LOSS:  < 5cc     SPECIMENS REMOVED:  None     ANESTHESIA: 20cc Local xylocaine with epinephrine     INDICATIONS FOR PROCEDURE: This is a 77y/o female with longstanding knee pain. She has failed non operative management including injections.I discussed a new treatment therapy called Iovera, which is cryotherapy, to provide symptomatic relief along the sensory distribution of the infrapatellar tendon branch of the saphenous nerve  and AFCN. The patient elected to move forward with this. We did discuss the fact that this is a fairly novel procedure and there is very limited scientific data around this.  However, it is FDA approved.  The patient was given patient information and literature to review prior to the procedure as well.  Based on this, the patient agreed to move forward with doing the procedure.     Time was spent discussing the cryoanalgesia procedure Iovera with the patient.  Risks and benefits were also discussed with patient.  X-rays were reviewed to use as a diagram to explain procedure. A detailed description of landmarks and placement of anesthetic used during procedure were also explained  to patient.  Contraindications for procedure were discussed with patient.     CONSENT:  Verbal and written consent were obtained from the patient.      PROCEDURE:    A surgical time out was performed, including verification of patient ID, procedure to be performed, site and side, availability of information and equipment, review of safety issues, and the patients agreement and consent.  The patient was placed supine on the exam table and the proximal medial aspect of the  right tibia and anterior aspect of distal femur was prepped with sterile Chlorhexidine and alcohol.  A line was drawn extending approximately 5 cm medial to inferior pole of the patella distally to a point approximately 5 cm medial to the tibial tubercle.  A second line was drawn in a medial to lateral direction the width of the patella approximately 13 cm proximal to the mid patella. We then infiltrated the skin with lidocaine with epinephrine along both lines using a 25g needle. We then introduced the Iovera device along these lines and this device penetrated the skin, creating cryotherapy to the superior and inferior branches of the infrapatellar saphenous nerve, a third treatment to the anterior femoral cutaneous nerve, and fourth LFCN. 8 punctures of the skin were made to treat the superior and inferior branches of the ISN and another 9 punctures were made to treat the AFCN and LFCN. There were a total of 4 nerves treated with iovera. The patient tolerated the procedure well with no problems.    PAIN SCORES:  Pre-procedure:  7-8/10  Post-procedure:  0/10  Gait: Non-antalgic      ASSESSMENT:          1. Chronic pain of right knee                PLAN:    All questions were answered to the best of my ability and all concerns were addressed at this time.    This note is dictated using the M*Modal Fluency Direct word recognition program. There are word recognition mistakes that are occasionally missed on review.

## 2024-06-05 NOTE — TELEPHONE ENCOUNTER
Left a voicemail for the patient to call the office back to get scheduled for 3 mo f/u s/p right knee Iovera. The patient can call the office at [phone].

## 2024-06-25 DIAGNOSIS — M25.561 PAIN IN BOTH KNEES, UNSPECIFIED CHRONICITY: ICD-10-CM

## 2024-06-25 DIAGNOSIS — M25.562 PAIN IN BOTH KNEES, UNSPECIFIED CHRONICITY: ICD-10-CM

## 2024-06-25 DIAGNOSIS — M17.11 PRIMARY OSTEOARTHRITIS OF RIGHT KNEE: ICD-10-CM

## 2024-06-25 DIAGNOSIS — M17.12 PRIMARY OSTEOARTHRITIS OF LEFT KNEE: ICD-10-CM

## 2024-06-25 RX ORDER — DICLOFENAC SODIUM 75 MG/1
75 TABLET, DELAYED RELEASE ORAL 2 TIMES DAILY
Qty: 60 TABLET | Refills: 0 | Status: SHIPPED | OUTPATIENT
Start: 2024-06-25

## 2024-07-03 ENCOUNTER — OFFICE VISIT (OUTPATIENT)
Dept: URGENT CARE | Facility: CLINIC | Age: 79
End: 2024-07-03
Payer: MEDICARE

## 2024-07-03 VITALS
HEART RATE: 67 BPM | RESPIRATION RATE: 18 BRPM | TEMPERATURE: 98 F | SYSTOLIC BLOOD PRESSURE: 136 MMHG | OXYGEN SATURATION: 96 % | WEIGHT: 161 LBS | DIASTOLIC BLOOD PRESSURE: 83 MMHG | HEIGHT: 66 IN | BODY MASS INDEX: 25.88 KG/M2

## 2024-07-03 DIAGNOSIS — R31.9 URINARY TRACT INFECTION WITH HEMATURIA, SITE UNSPECIFIED: Primary | ICD-10-CM

## 2024-07-03 DIAGNOSIS — N39.0 URINARY TRACT INFECTION WITH HEMATURIA, SITE UNSPECIFIED: Primary | ICD-10-CM

## 2024-07-03 LAB
BILIRUBIN, UA POC OHS: NEGATIVE
BLOOD, UA POC OHS: ABNORMAL
CLARITY, UA POC OHS: ABNORMAL
COLOR, UA POC OHS: YELLOW
GLUCOSE, UA POC OHS: NEGATIVE
KETONES, UA POC OHS: NEGATIVE
LEUKOCYTES, UA POC OHS: ABNORMAL
NITRITE, UA POC OHS: NEGATIVE
PH, UA POC OHS: 6
PROTEIN, UA POC OHS: NEGATIVE
SPECIFIC GRAVITY, UA POC OHS: 1.01
UROBILINOGEN, UA POC OHS: 0.2

## 2024-07-03 PROCEDURE — 99213 OFFICE O/P EST LOW 20 MIN: CPT | Mod: S$GLB,,, | Performed by: NURSE PRACTITIONER

## 2024-07-03 PROCEDURE — 81003 URINALYSIS AUTO W/O SCOPE: CPT | Mod: QW,S$GLB,, | Performed by: NURSE PRACTITIONER

## 2024-07-03 PROCEDURE — 87088 URINE BACTERIA CULTURE: CPT | Performed by: NURSE PRACTITIONER

## 2024-07-03 PROCEDURE — 87086 URINE CULTURE/COLONY COUNT: CPT | Performed by: NURSE PRACTITIONER

## 2024-07-03 RX ORDER — AMOXICILLIN AND CLAVULANATE POTASSIUM 875; 125 MG/1; MG/1
1 TABLET, FILM COATED ORAL EVERY 12 HOURS
Qty: 20 TABLET | Refills: 0 | Status: SHIPPED | OUTPATIENT
Start: 2024-07-03

## 2024-07-03 NOTE — PROGRESS NOTES
"Subjective:      Patient ID: Kj Chávez is a 78 y.o. female.    Vitals:  height is 5' 6" (1.676 m) and weight is 73 kg (161 lb). Her oral temperature is 97.9 °F (36.6 °C). Her blood pressure is 136/83 and her pulse is 67. Her respiration is 18 and oxygen saturation is 96%.     Chief Complaint: Dysuria    Pt presents today with dysuria and concern for a possible uti. Pt states she began to feel itching and burning sensation of her urine and vaginal area last week. Pt states she has not taken anything directly for these symptoms.     Dysuria   This is a new problem. The current episode started in the past 7 days. The problem has been gradually worsening. The quality of the pain is described as burning. The pain is at a severity of 0/10. The patient is experiencing no pain. There has been no fever. She is Not sexually active. There is No history of pyelonephritis. Associated symptoms include frequency, hesitancy and urgency. Pertinent negatives include no behavior changes, chills, discharge, flank pain, hematuria, nausea, possible pregnancy, sweats, vomiting, weight loss, bubble bath use, constipation, rash or withholding. She has tried nothing for the symptoms. The treatment provided no relief. There is no history of catheterization, diabetes insipidus, diabetes mellitus, genitourinary reflux, hypertension, kidney stones, recurrent UTIs, a single kidney, STD, urinary stasis or a urological procedure.       Constitution: Negative for chills.   Gastrointestinal:  Negative for nausea, vomiting and constipation.   Genitourinary:  Positive for dysuria, frequency and urgency. Negative for flank pain and hematuria.   Skin:  Negative for rash.      Objective:     Physical Exam   Constitutional: No distress.   HENT:   Head: Normocephalic.   Pulmonary/Chest: Effort normal.   Abdominal: Normal appearance and bowel sounds are normal. She exhibits no distension and no mass. Soft. There is abdominal tenderness. There is " left CVA tenderness. There is no rebound, no guarding and no right CVA tenderness. No hernia.   Neurological: She is alert.   Skin: Skin is warm and dry.   Psychiatric: Her behavior is normal.       Assessment:     1. Urinary tract infection with hematuria, site unspecified        Plan:       Urinary tract infection with hematuria, site unspecified  -     POCT Urinalysis(Instrument)  -     amoxicillin-clavulanate 875-125mg (AUGMENTIN) 875-125 mg per tablet; Take 1 tablet by mouth every 12 (twelve) hours.  Dispense: 20 tablet; Refill: 0      Results for orders placed or performed in visit on 07/03/24   POCT Urinalysis(Instrument)   Result Value Ref Range    Color, POC UA Yellow Yellow, Straw, Colorless    Clarity, POC UA Cloudy (A) Clear    Glucose, POC UA Negative Negative    Bilirubin, POC UA Negative Negative    Ketones, POC UA Negative Negative    Spec Grav POC UA 1.010 1.005 - 1.030    Blood, POC UA Trace-intact (A) Negative    pH, POC UA 6.0 5.0 - 8.0    Protein, POC UA Negative Negative    Urobilinogen, POC UA 0.2 <=1.0    Nitrite, POC UA Negative Negative    WBC, POC UA Large (A) Negative     Patient Instructions     UTI (Urinary Tract Infection)    Cranberry juice may help. Get the 100% cranberry juice and mix 4 oz of juice with 4 oz of water and drink this 8 oz glass of liquid once a day.     Increase water intake to at least 8-10 glasses/day.    Avoid caffeine, alcohol, or spicy foods as they irritate the bladder.      If you take OTC AZO/Pyridium/Phenazopyridine, follow instructions as directed.  Do NOT take for more than 2 days.  Do not wear contacts with Pyridium as it will stain them.  You may want to wear a panty liner with Pyridium as it may stain your underwear.    If you had cultures done it will take 3-5 days to result. We will call you with the result.    If you are are female and on birth control pills use additional methods (such as condom use) to prevent pregnancy while on the antibiotics and  for one cycle after.     If your condition worsens or fails to improve we recommend that you receive another evaluation at the ER immediately or contact your PCP to discuss your concerns or return here.

## 2024-07-04 LAB — BACTERIA UR CULT: ABNORMAL

## 2024-08-06 ENCOUNTER — LAB VISIT (OUTPATIENT)
Dept: LAB | Facility: HOSPITAL | Age: 79
End: 2024-08-06
Attending: INTERNAL MEDICINE
Payer: MEDICARE

## 2024-08-06 ENCOUNTER — OFFICE VISIT (OUTPATIENT)
Dept: INTERNAL MEDICINE | Facility: CLINIC | Age: 79
End: 2024-08-06
Payer: MEDICARE

## 2024-08-06 VITALS
WEIGHT: 158.94 LBS | SYSTOLIC BLOOD PRESSURE: 120 MMHG | OXYGEN SATURATION: 100 % | DIASTOLIC BLOOD PRESSURE: 74 MMHG | BODY MASS INDEX: 25.54 KG/M2 | HEIGHT: 66 IN | HEART RATE: 66 BPM

## 2024-08-06 DIAGNOSIS — Z13.6 ENCOUNTER FOR LIPID SCREENING FOR CARDIOVASCULAR DISEASE: ICD-10-CM

## 2024-08-06 DIAGNOSIS — E21.3 HYPERPARATHYROIDISM, UNSPECIFIED: ICD-10-CM

## 2024-08-06 DIAGNOSIS — R73.09 BLOOD GLUCOSE ABNORMAL: ICD-10-CM

## 2024-08-06 DIAGNOSIS — D56.3 ALPHA THALASSEMIA TRAIT: Chronic | ICD-10-CM

## 2024-08-06 DIAGNOSIS — R73.03 PREDIABETES: ICD-10-CM

## 2024-08-06 DIAGNOSIS — Z00.00 LABORATORY EXAMINATION ORDERED AS PART OF A ROUTINE GENERAL MEDICAL EXAMINATION: ICD-10-CM

## 2024-08-06 DIAGNOSIS — Z12.11 SCREENING FOR COLON CANCER: ICD-10-CM

## 2024-08-06 DIAGNOSIS — I70.0 AORTIC ATHEROSCLEROSIS: ICD-10-CM

## 2024-08-06 DIAGNOSIS — Z00.00 ENCOUNTER FOR ANNUAL PHYSICAL EXAM: Primary | ICD-10-CM

## 2024-08-06 DIAGNOSIS — M81.0 AGE-RELATED OSTEOPOROSIS WITHOUT CURRENT PATHOLOGICAL FRACTURE: Chronic | ICD-10-CM

## 2024-08-06 DIAGNOSIS — R35.0 URINARY FREQUENCY: ICD-10-CM

## 2024-08-06 DIAGNOSIS — Z12.31 BREAST CANCER SCREENING BY MAMMOGRAM: ICD-10-CM

## 2024-08-06 DIAGNOSIS — N32.81 OVERACTIVE BLADDER: ICD-10-CM

## 2024-08-06 DIAGNOSIS — Z13.220 ENCOUNTER FOR LIPID SCREENING FOR CARDIOVASCULAR DISEASE: ICD-10-CM

## 2024-08-06 DIAGNOSIS — Z86.018 HISTORY OF BENIGN GASTRIC TUMOR: ICD-10-CM

## 2024-08-06 LAB
ALBUMIN SERPL BCP-MCNC: 3.9 G/DL (ref 3.5–5.2)
ALP SERPL-CCNC: 100 U/L (ref 55–135)
ALT SERPL W/O P-5'-P-CCNC: 15 U/L (ref 10–44)
ANION GAP SERPL CALC-SCNC: 8 MMOL/L (ref 8–16)
AST SERPL-CCNC: 15 U/L (ref 10–40)
BASOPHILS # BLD AUTO: 0.04 K/UL (ref 0–0.2)
BASOPHILS NFR BLD: 1 % (ref 0–1.9)
BILIRUB SERPL-MCNC: 0.4 MG/DL (ref 0.1–1)
BUN SERPL-MCNC: 11 MG/DL (ref 8–23)
CALCIUM SERPL-MCNC: 9.6 MG/DL (ref 8.7–10.5)
CHLORIDE SERPL-SCNC: 103 MMOL/L (ref 95–110)
CHOLEST SERPL-MCNC: 232 MG/DL (ref 120–199)
CHOLEST/HDLC SERPL: 3.7 {RATIO} (ref 2–5)
CO2 SERPL-SCNC: 26 MMOL/L (ref 23–29)
CREAT SERPL-MCNC: 0.7 MG/DL (ref 0.5–1.4)
DIFFERENTIAL METHOD BLD: ABNORMAL
EOSINOPHIL # BLD AUTO: 0.1 K/UL (ref 0–0.5)
EOSINOPHIL NFR BLD: 3.1 % (ref 0–8)
ERYTHROCYTE [DISTWIDTH] IN BLOOD BY AUTOMATED COUNT: 15.3 % (ref 11.5–14.5)
EST. GFR  (NO RACE VARIABLE): >60 ML/MIN/1.73 M^2
ESTIMATED AVG GLUCOSE: 114 MG/DL (ref 68–131)
GLUCOSE SERPL-MCNC: 95 MG/DL (ref 70–110)
HBA1C MFR BLD: 5.6 % (ref 4–5.6)
HCT VFR BLD AUTO: 40.2 % (ref 37–48.5)
HDLC SERPL-MCNC: 62 MG/DL (ref 40–75)
HDLC SERPL: 26.7 % (ref 20–50)
HGB BLD-MCNC: 12.2 G/DL (ref 12–16)
IMM GRANULOCYTES # BLD AUTO: 0.01 K/UL (ref 0–0.04)
IMM GRANULOCYTES NFR BLD AUTO: 0.2 % (ref 0–0.5)
LDLC SERPL CALC-MCNC: 153.6 MG/DL (ref 63–159)
LYMPHOCYTES # BLD AUTO: 2 K/UL (ref 1–4.8)
LYMPHOCYTES NFR BLD: 47.2 % (ref 18–48)
MCH RBC QN AUTO: 22.3 PG (ref 27–31)
MCHC RBC AUTO-ENTMCNC: 30.3 G/DL (ref 32–36)
MCV RBC AUTO: 74 FL (ref 82–98)
MONOCYTES # BLD AUTO: 0.5 K/UL (ref 0.3–1)
MONOCYTES NFR BLD: 12 % (ref 4–15)
NEUTROPHILS # BLD AUTO: 1.5 K/UL (ref 1.8–7.7)
NEUTROPHILS NFR BLD: 36.5 % (ref 38–73)
NONHDLC SERPL-MCNC: 170 MG/DL
NRBC BLD-RTO: 0 /100 WBC
PLATELET # BLD AUTO: 262 K/UL (ref 150–450)
PMV BLD AUTO: 12.9 FL (ref 9.2–12.9)
POTASSIUM SERPL-SCNC: 4.4 MMOL/L (ref 3.5–5.1)
PROT SERPL-MCNC: 6.9 G/DL (ref 6–8.4)
RBC # BLD AUTO: 5.47 M/UL (ref 4–5.4)
SODIUM SERPL-SCNC: 137 MMOL/L (ref 136–145)
TRIGL SERPL-MCNC: 82 MG/DL (ref 30–150)
TSH SERPL DL<=0.005 MIU/L-ACNC: 1.84 UIU/ML (ref 0.4–4)
WBC # BLD AUTO: 4.15 K/UL (ref 3.9–12.7)

## 2024-08-06 PROCEDURE — 36415 COLL VENOUS BLD VENIPUNCTURE: CPT | Performed by: INTERNAL MEDICINE

## 2024-08-06 PROCEDURE — 80053 COMPREHEN METABOLIC PANEL: CPT | Performed by: INTERNAL MEDICINE

## 2024-08-06 PROCEDURE — 1159F MED LIST DOCD IN RCRD: CPT | Mod: CPTII,S$GLB,, | Performed by: INTERNAL MEDICINE

## 2024-08-06 PROCEDURE — 83036 HEMOGLOBIN GLYCOSYLATED A1C: CPT | Performed by: INTERNAL MEDICINE

## 2024-08-06 PROCEDURE — 3288F FALL RISK ASSESSMENT DOCD: CPT | Mod: CPTII,S$GLB,, | Performed by: INTERNAL MEDICINE

## 2024-08-06 PROCEDURE — 99999 PR PBB SHADOW E&M-EST. PATIENT-LVL III: CPT | Mod: PBBFAC,,, | Performed by: INTERNAL MEDICINE

## 2024-08-06 PROCEDURE — 1101F PT FALLS ASSESS-DOCD LE1/YR: CPT | Mod: CPTII,S$GLB,, | Performed by: INTERNAL MEDICINE

## 2024-08-06 PROCEDURE — 80061 LIPID PANEL: CPT | Performed by: INTERNAL MEDICINE

## 2024-08-06 PROCEDURE — 1125F AMNT PAIN NOTED PAIN PRSNT: CPT | Mod: CPTII,S$GLB,, | Performed by: INTERNAL MEDICINE

## 2024-08-06 PROCEDURE — 85025 COMPLETE CBC W/AUTO DIFF WBC: CPT | Performed by: INTERNAL MEDICINE

## 2024-08-06 PROCEDURE — 84443 ASSAY THYROID STIM HORMONE: CPT | Performed by: INTERNAL MEDICINE

## 2024-08-06 PROCEDURE — 99397 PER PM REEVAL EST PAT 65+ YR: CPT | Mod: S$GLB,,, | Performed by: INTERNAL MEDICINE

## 2024-08-06 PROCEDURE — 3074F SYST BP LT 130 MM HG: CPT | Mod: CPTII,S$GLB,, | Performed by: INTERNAL MEDICINE

## 2024-08-06 PROCEDURE — 3078F DIAST BP <80 MM HG: CPT | Mod: CPTII,S$GLB,, | Performed by: INTERNAL MEDICINE

## 2024-08-06 RX ORDER — OXYBUTYNIN CHLORIDE 5 MG/1
5 TABLET, EXTENDED RELEASE ORAL DAILY
Qty: 90 TABLET | Refills: 3 | Status: SHIPPED | OUTPATIENT
Start: 2024-08-06 | End: 2025-08-06

## 2024-08-08 ENCOUNTER — HOSPITAL ENCOUNTER (OUTPATIENT)
Dept: RADIOLOGY | Facility: HOSPITAL | Age: 79
Discharge: HOME | End: 2024-08-08
Attending: INTERNAL MEDICINE
Payer: MEDICARE

## 2024-08-08 DIAGNOSIS — Z12.31 BREAST CANCER SCREENING BY MAMMOGRAM: ICD-10-CM

## 2024-08-08 PROCEDURE — 77067 SCR MAMMO BI INCL CAD: CPT | Mod: TC

## 2024-08-12 ENCOUNTER — OFFICE VISIT (OUTPATIENT)
Dept: URGENT CARE | Facility: CLINIC | Age: 79
End: 2024-08-12
Payer: MEDICARE

## 2024-08-12 VITALS
WEIGHT: 158 LBS | TEMPERATURE: 99 F | OXYGEN SATURATION: 97 % | DIASTOLIC BLOOD PRESSURE: 66 MMHG | SYSTOLIC BLOOD PRESSURE: 124 MMHG | RESPIRATION RATE: 17 BRPM | HEART RATE: 74 BPM | BODY MASS INDEX: 25.39 KG/M2 | HEIGHT: 66 IN

## 2024-08-12 DIAGNOSIS — K57.92 DIVERTICULITIS: Primary | ICD-10-CM

## 2024-08-12 DIAGNOSIS — R10.32 LEFT LOWER QUADRANT ABDOMINAL PAIN: ICD-10-CM

## 2024-08-12 LAB
BILIRUBIN, UA POC OHS: NEGATIVE
BLOOD, UA POC OHS: NEGATIVE
CLARITY, UA POC OHS: CLEAR
COLOR, UA POC OHS: YELLOW
GLUCOSE, UA POC OHS: NEGATIVE
KETONES, UA POC OHS: ABNORMAL
LEUKOCYTES, UA POC OHS: ABNORMAL
NITRITE, UA POC OHS: NEGATIVE
PH, UA POC OHS: 6
PROTEIN, UA POC OHS: ABNORMAL
SPECIFIC GRAVITY, UA POC OHS: >=1.03
UROBILINOGEN, UA POC OHS: 0.2

## 2024-08-12 PROCEDURE — 99214 OFFICE O/P EST MOD 30 MIN: CPT | Mod: S$GLB,,,

## 2024-08-12 PROCEDURE — 81003 URINALYSIS AUTO W/O SCOPE: CPT | Mod: QW,S$GLB,,

## 2024-08-12 RX ORDER — AMOXICILLIN AND CLAVULANATE POTASSIUM 875; 125 MG/1; MG/1
1 TABLET, FILM COATED ORAL EVERY 12 HOURS
Qty: 20 TABLET | Refills: 0 | Status: SHIPPED | OUTPATIENT
Start: 2024-08-12 | End: 2024-08-12

## 2024-08-12 RX ORDER — AMOXICILLIN AND CLAVULANATE POTASSIUM 875; 125 MG/1; MG/1
1 TABLET, FILM COATED ORAL EVERY 8 HOURS
Qty: 30 TABLET | Refills: 0 | Status: SHIPPED | OUTPATIENT
Start: 2024-08-12 | End: 2024-08-22

## 2024-08-12 NOTE — PROGRESS NOTES
"Subjective:      Patient ID: Kj Chávez is a 78 y.o. female.    Vitals:  height is 5' 6" (1.676 m) and weight is 71.7 kg (158 lb). Her oral temperature is 98.7 °F (37.1 °C). Her blood pressure is 124/66 and her pulse is 74. Her respiration is 17 and oxygen saturation is 97%.     Chief Complaint: Abdominal Pain    77 yo female c/o sharp pain in lower left abdomen. Pt reports headache and constipation, last bowel movement yesterday. Sx began 3 weeks ago. Pt reports dull, aching pain with occasional sharp pains. Pt reports pain level is a 6, sharp pain is an 8. Pt reports self medicating with ducolax and ibuprofen. Pt reports dx of diverticulitis (not in chart), GERD dx is in chart, unknown relation to sx.     Abdominal Pain  This is a new problem. The current episode started 1 to 4 weeks ago. The onset quality is sudden. The pain is located in the LLQ. The quality of the pain is aching and sharp. The abdominal pain does not radiate. Associated symptoms include constipation and headaches. Pertinent negatives include no anorexia, arthralgias, belching, diarrhea, dysuria, fever, flatus, frequency, hematochezia, hematuria, melena, myalgias, nausea, vomiting or weight loss. Treatments tried: ducolax and ibuprofen. Her past medical history is significant for GERD. There is no history of abdominal surgery, colon cancer, Crohn's disease, gallstones, irritable bowel syndrome, pancreatitis, PUD or ulcerative colitis. Patient's medical history does not include kidney stones and UTI.       Constitution: Negative for chills, fatigue and fever.   Gastrointestinal:  Positive for abdominal pain and constipation. Negative for history of abdominal surgery, nausea, vomiting, diarrhea and bright red blood in stool.   Genitourinary:  Negative for dysuria, frequency, urgency and hematuria.   Musculoskeletal:  Negative for joint pain and muscle ache.   Neurological:  Positive for headaches.      Objective:     Physical Exam "   Constitutional: She is oriented to person, place, and time. She appears well-developed.   HENT:   Head: Normocephalic and atraumatic.   Ears:   Right Ear: External ear normal.   Left Ear: External ear normal.   Nose: Nose normal.   Mouth/Throat: Mucous membranes are normal.   Eyes: Conjunctivae and lids are normal.   Neck: Trachea normal. Neck supple.   Cardiovascular: Normal rate, regular rhythm and normal heart sounds.   Pulmonary/Chest: Effort normal and breath sounds normal. No stridor. No respiratory distress. She has no wheezes. She has no rhonchi. She has no rales.   Abdominal: Normal appearance and bowel sounds are normal. She exhibits no distension and no mass. Soft. There is abdominal tenderness in the left lower quadrant. There is guarding. There is no rebound, no left CVA tenderness and no right CVA tenderness.   Musculoskeletal: Normal range of motion.         General: Normal range of motion.   Neurological: She is alert and oriented to person, place, and time. She has normal strength.   Skin: Skin is warm, dry, intact, not diaphoretic and not pale.   Psychiatric: Her speech is normal and behavior is normal. Judgment and thought content normal.   Nursing note and vitals reviewed.      Assessment:     1. Diverticulitis    2. Left lower quadrant abdominal pain        Plan:     Results for orders placed or performed in visit on 08/12/24   POCT Urinalysis(Instrument)   Result Value Ref Range    Color, POC UA Yellow Yellow, Straw, Colorless    Clarity, POC UA Clear Clear    Glucose, POC UA Negative Negative    Bilirubin, POC UA Negative Negative    Ketones, POC UA Trace (A) Negative    Spec Grav POC UA >=1.030 1.005 - 1.030    Blood, POC UA Negative Negative    pH, POC UA 6.0 5.0 - 8.0    Protein, POC UA Trace (A) Negative    Urobilinogen, POC UA 0.2 <=1.0    Nitrite, POC UA Negative Negative    WBC, POC UA Small (A) Negative       Diverticulitis  -     Ambulatory referral/consult to Gastroenterology  -      Discontinue: amoxicillin-clavulanate 875-125mg (AUGMENTIN) 875-125 mg per tablet; Take 1 tablet by mouth every 12 (twelve) hours. for 10 days  Dispense: 20 tablet; Refill: 0  -     amoxicillin-clavulanate 875-125mg (AUGMENTIN) 875-125 mg per tablet; Take 1 tablet by mouth every 8 (eight) hours. for 10 days  Dispense: 30 tablet; Refill: 0    Left lower quadrant abdominal pain  -     POCT Urinalysis(Instrument)      Medical Decision Making:   Differential Diagnosis:   Diverticulitis, constipation, colitis, appendicitis, UTI  Clinical Tests:   Lab Tests: Ordered and Reviewed  Urgent Care Management:  79 y/o F with hx of diverticulitis presents with sharp LLQ abdominal pain intermittently x 3 weeks. Symptoms worsened over the weekend and radiating to back. She has been having constipation. LBM yesterday. No fevers, N/V, dysuria, urinary frequency/urgency. No acute distress. On exam, she has involuntary guarding to LLQ. No rebound. No CVA tenderness. UA with small leukocytes; no blood or nitrates. No suprapubic tenderness. Discussed case with Dr. Stark here in the clinic. Will treat with augmentin TID x 10 days. Bowel rest. Urgent GI referral made and encouraged to f/u in 1 week. Advised if symptoms worsen in the next 24 hours, she needs to be seen in the ER. Strict precautions given.     30 minutes spent on patient's encounter. This includes face to face time and non-face to face time preparing to see the patient (eg, review of tests), obtaining and/or reviewing separately obtained history, documenting clinical information in the electronic or other health record, independently interpreting results and communicating results to the patient/family/caregiver, or care coordinator.    Discussed results/diagnosis/plan with patient in clinic. Strict precautions given to patient to monitor for worsening signs and symptoms. Advised to follow up with PCP or specialist.  Explained side effects of medications prescribed with  patient and informed him/her to discontinue use if he/she has any side effects and to inform UC or PCP if this occurs. All questions answered. Strict ED verses clinic return precautions stressed and given in depth. Advised if symptoms worsens of fail to improve he/she should go to the Emergency Room. Discharge and follow-up instructions given verbally/printed with the patient who expressed understanding and willingness to comply with my recommendations. Patient voiced understanding and in agreement with current treatment plan. Patient exits the exam room in no acute distress. Conversant and engaged during discharge discussion, verbalized understanding.

## 2024-08-12 NOTE — PATIENT INSTRUCTIONS
Start augmentin antibiotic and take as prescribed. Take full dose or symptoms will not get better. Take with food and water to decrease GI upset. Try a probiotic supplement or eat daily yogurt to maintain good gut and vaginal maureen while on an antibiotic. Do not drink alcohol while taking an antibiotic.       Try bowel rest; mostly clear liquid diet.     Drink plenty of water. Try Pedialyte for electrolyte replacement. The recommended daily fluid intake for women is 2.7 liters (five 16 oz bottles).      Take extra strength tylenol (650-1000 mg) every 8 hours as needed for added pain relief.      Follow up with GI in 1 week. A referral was placed for you, call [phone] to schedule appointment.     Go to the ER if symptoms worsen in the next 24 hours.

## 2024-08-18 ENCOUNTER — OFFICE VISIT (OUTPATIENT)
Dept: URGENT CARE | Facility: CLINIC | Age: 79
End: 2024-08-18
Payer: MEDICARE

## 2024-08-18 VITALS
WEIGHT: 158.06 LBS | RESPIRATION RATE: 16 BRPM | SYSTOLIC BLOOD PRESSURE: 133 MMHG | HEIGHT: 66 IN | DIASTOLIC BLOOD PRESSURE: 85 MMHG | HEART RATE: 78 BPM | TEMPERATURE: 98 F | BODY MASS INDEX: 25.4 KG/M2 | OXYGEN SATURATION: 98 %

## 2024-08-18 DIAGNOSIS — U07.1 COVID-19 VIRUS INFECTION: Primary | ICD-10-CM

## 2024-08-18 DIAGNOSIS — R50.9 FEVER, UNSPECIFIED FEVER CAUSE: ICD-10-CM

## 2024-08-18 DIAGNOSIS — U07.1 COVID-19 VIRUS DETECTED: ICD-10-CM

## 2024-08-18 DIAGNOSIS — R05.9 COUGH, UNSPECIFIED TYPE: ICD-10-CM

## 2024-08-18 DIAGNOSIS — J02.9 SORE THROAT: ICD-10-CM

## 2024-08-18 LAB
CTP QC/QA: YES
SARS-COV-2 AG RESP QL IA.RAPID: POSITIVE

## 2024-08-18 PROCEDURE — 87811 SARS-COV-2 COVID19 W/OPTIC: CPT | Mod: QW,S$GLB,, | Performed by: SURGERY

## 2024-08-18 PROCEDURE — 99214 OFFICE O/P EST MOD 30 MIN: CPT | Mod: S$GLB,,, | Performed by: SURGERY

## 2024-08-18 RX ORDER — PROMETHAZINE HYDROCHLORIDE AND DEXTROMETHORPHAN HYDROBROMIDE 6.25; 15 MG/5ML; MG/5ML
5 SYRUP ORAL EVERY 4 HOURS PRN
Qty: 180 ML | Refills: 0 | Status: SHIPPED | OUTPATIENT
Start: 2024-08-18 | End: 2024-08-25

## 2024-08-18 RX ORDER — ONDANSETRON 4 MG/1
4 TABLET, ORALLY DISINTEGRATING ORAL EVERY 8 HOURS PRN
Qty: 12 TABLET | Refills: 0 | Status: SHIPPED | OUTPATIENT
Start: 2024-08-18

## 2024-08-18 NOTE — PROGRESS NOTES
"Subjective:      Patient ID: Kj Chávez is a 78 y.o. female.    Vitals:  height is 5' 6" (1.676 m) and weight is 71.7 kg (158 lb 1.1 oz). Her oral temperature is 98.4 °F (36.9 °C). Her blood pressure is 133/85 and her pulse is 78. Her respiration is 16 and oxygen saturation is 98%.     Chief Complaint: Fever (With chill's, runny nose and body aches associated. )    Patient reports with chill's, fever, runny nose, and body aches that presented on Friday, she reports with taking Alker seltzer plus, Flonase, and dayquil cough suppressant to help combat her multi-symptoms however, her symptoms remains unchanged.     Fever   This is a new problem. The current episode started 2 days ago. The problem occurs cycles. The problem has been waxing and waning. She has not experienced a heat injury.Associated symptoms include congestion, diarrhea, ear pain (bilateral ear pain), muscle aches and a sore throat (mild.). Pertinent negatives include no abdominal pain, coughing, headaches, nausea, not playful when afebrile, rash, sleepiness, urinary pain, vomiting or wheezing. She has tried acetaminophen and fluids for the symptoms. The treatment provided no relief.       Constitution: Positive for fever.   HENT:  Positive for ear pain (bilateral ear pain), congestion and sore throat (mild.).    Respiratory:  Negative for cough and wheezing.    Gastrointestinal:  Positive for diarrhea. Negative for abdominal pain, nausea and vomiting.   Skin:  Negative for rash.   Neurological:  Negative for headaches.      Objective:     Physical Exam   Constitutional: She is oriented to person, place, and time. She appears well-developed. She is cooperative.  Non-toxic appearance. She does not appear ill. No distress.   HENT:   Head: Normocephalic and atraumatic.   Ears:   Right Ear: Hearing, tympanic membrane, external ear and ear canal normal.   Left Ear: Hearing, tympanic membrane, external ear and ear canal normal.   Nose: Mucosal edema " and rhinorrhea present. No nasal deformity. No epistaxis. Right sinus exhibits no maxillary sinus tenderness and no frontal sinus tenderness. Left sinus exhibits no maxillary sinus tenderness and no frontal sinus tenderness.   Mouth/Throat: Uvula is midline and mucous membranes are normal. No trismus in the jaw. Normal dentition. No uvula swelling. Posterior oropharyngeal edema and posterior oropharyngeal erythema present. No oropharyngeal exudate.   Eyes: Conjunctivae and lids are normal. No scleral icterus.   Neck: Trachea normal and phonation normal. Neck supple. No edema present. No erythema present. No neck rigidity present.   Cardiovascular: Normal rate, regular rhythm, normal heart sounds and normal pulses.   Pulmonary/Chest: Effort normal and breath sounds normal. No respiratory distress. She has no decreased breath sounds. She has no rhonchi.   Abdominal: Normal appearance.   Musculoskeletal: Normal range of motion.         General: No deformity. Normal range of motion.   Neurological: She is alert and oriented to person, place, and time. She exhibits normal muscle tone. Coordination normal.   Skin: Skin is warm, dry, intact, not diaphoretic and not pale.   Psychiatric: Her speech is normal and behavior is normal. Judgment and thought content normal.   Nursing note and vitals reviewed.      Assessment:     1. COVID-19 virus infection    2. Cough, unspecified type    3. Fever, unspecified fever cause    4. Sore throat        Plan:       COVID-19 virus infection  -     nirmatrelvir-ritonavir 300 mg (150 mg x 2)-100 mg copackaged tablets (EUA); Take 3 tablets by mouth 2 (two) times daily for 5 days. Each dose contains 2 nirmatrelvir (pink tablets) and 1 ritonavir (white tablet). Take all 3 tablets together  Dispense: 30 tablet; Refill: 0  -     ondansetron (ZOFRAN-ODT) 4 MG TbDL; Take 1 tablet (4 mg total) by mouth every 8 (eight) hours as needed (nausea).  Dispense: 12 tablet; Refill: 0    Cough, unspecified  type  -     SARS Coronavirus 2 Antigen, POCT Manual Read  -     promethazine-dextromethorphan (PROMETHAZINE-DM) 6.25-15 mg/5 mL Syrp; Take 5 mLs by mouth every 4 (four) hours as needed (cough).  Dispense: 180 mL; Refill: 0    Fever, unspecified fever cause  -     SARS Coronavirus 2 Antigen, POCT Manual Read    Sore throat  -     SARS Coronavirus 2 Antigen, POCT Manual Read      Results for orders placed or performed in visit on 08/18/24   SARS Coronavirus 2 Antigen, POCT Manual Read   Result Value Ref Range    SARS Coronavirus 2 Antigen Positive (A) Negative     Acceptable Yes        Discussed benefits and risks of Paxlovid.  Prescribed Zofran to be used as needed should nausea developed while taking it.  She has had cough that is worse at night and prescribe some Phenergan DM for that as needed.  Reviewed OTC medications that can be taken for symptoms.  ER precautions given.

## 2024-08-27 ENCOUNTER — OFFICE VISIT (OUTPATIENT)
Dept: SURGERY | Facility: CLINIC | Age: 79
End: 2024-08-27
Payer: MEDICARE

## 2024-08-27 VITALS
HEIGHT: 66 IN | HEART RATE: 76 BPM | WEIGHT: 160.94 LBS | DIASTOLIC BLOOD PRESSURE: 91 MMHG | SYSTOLIC BLOOD PRESSURE: 143 MMHG | BODY MASS INDEX: 25.86 KG/M2

## 2024-08-27 DIAGNOSIS — R10.32 LEFT LOWER QUADRANT ABDOMINAL PAIN: ICD-10-CM

## 2024-08-27 DIAGNOSIS — K59.09 OTHER CONSTIPATION: Primary | ICD-10-CM

## 2024-08-27 PROCEDURE — 1160F RVW MEDS BY RX/DR IN RCRD: CPT | Mod: CPTII,S$GLB,, | Performed by: NURSE PRACTITIONER

## 2024-08-27 PROCEDURE — 3288F FALL RISK ASSESSMENT DOCD: CPT | Mod: CPTII,S$GLB,, | Performed by: NURSE PRACTITIONER

## 2024-08-27 PROCEDURE — 1159F MED LIST DOCD IN RCRD: CPT | Mod: CPTII,S$GLB,, | Performed by: NURSE PRACTITIONER

## 2024-08-27 PROCEDURE — 99203 OFFICE O/P NEW LOW 30 MIN: CPT | Mod: S$GLB,,, | Performed by: NURSE PRACTITIONER

## 2024-08-27 PROCEDURE — 99999 PR PBB SHADOW E&M-EST. PATIENT-LVL III: CPT | Mod: PBBFAC,,, | Performed by: NURSE PRACTITIONER

## 2024-08-27 PROCEDURE — 3077F SYST BP >= 140 MM HG: CPT | Mod: CPTII,S$GLB,, | Performed by: NURSE PRACTITIONER

## 2024-08-27 PROCEDURE — 1126F AMNT PAIN NOTED NONE PRSNT: CPT | Mod: CPTII,S$GLB,, | Performed by: NURSE PRACTITIONER

## 2024-08-27 PROCEDURE — 1101F PT FALLS ASSESS-DOCD LE1/YR: CPT | Mod: CPTII,S$GLB,, | Performed by: NURSE PRACTITIONER

## 2024-08-27 PROCEDURE — 3080F DIAST BP >= 90 MM HG: CPT | Mod: CPTII,S$GLB,, | Performed by: NURSE PRACTITIONER

## 2024-08-27 NOTE — PROGRESS NOTES
CRS Office Visit History and Physical    Referring Md:   Ansley De Los Santos, Np  [address] [facility]  [place] [id number]    SUBJECTIVE:     Chief Complaint: LLQ abd pain    History of Present Illness:  The patient is a new patient to this practice.   Course is as follows:  Patient is a 78 y.o. female presents with constipation f/u. Went to urgent care on 8/12/2024 for LLQ abd pain. Referred to GI for diverticulitis.   Has had several CT over the years w/o diverticulitis  She was having LLQ abd pain, she thought it was due to constipation. She took some dulcolax, had a BM, felt better.   In general she has a BM daily but then when she took flights to [place], then [facility]. This changes her BMs  No rectal bleeding or proctalgia  But she did have some straining    Last Colonoscopy: 2021  - The examined portion of the ileum was normal.                          - One 4 mm polyp in the transverse colon, removed                          with a cold snare. Resected and retrieved.                          - One 2 mm polyp in the sigmoid colon, removed                          with a jumbo cold forceps. Resected and retrieved.                          - Diverticulosis in the sigmoid colon.                          - The examination was otherwise normal on direct                          and retroflexion views.  Repeat 5 yrs  Family history of colorectal cancer or IBD: mother colon cancer.    Review of patient's allergies indicates:   Allergen Reactions    Atorvastatin      Liver enzyme elevation    Sulfa (sulfonamide antibiotics) Swelling    Codeine Anxiety       Past Medical History:   Diagnosis Date    Allergy     Cataract     Colon polyp     Gastric nodule     GERD (gastroesophageal reflux disease)     Hypertension     mild, resolved     Osteoporosis      Past Surgical History:   Procedure Laterality Date    COLONOSCOPY N/A 2/23/2016    Procedure: COLONOSCOPY;  Surgeon: Christian Medina MD;  Location: [address]  "FLR);  Service: Endoscopy;  Laterality: N/A;    COLONOSCOPY N/A 7/19/2021    Procedure: COLONOSCOPY;  Surgeon: Maile Lal MD;  Location: [facility] (4TH FLR);  Service: Endoscopy;  Laterality: N/A;  Vaccinated 2/3/21 see Immunizations, prep instr emailed -ml    UPPER GASTROINTESTINAL ENDOSCOPY       Family History   Problem Relation Name Age of Onset    Colon cancer Mother  80    Cancer Mother          colon    No Known Problems Father      Breast cancer Sister  50    Cancer Sister          breast    No Known Problems Daughter      No Known Problems Son      No Known Problems Daughter      Ovarian cancer Neg Hx      Diabetes Neg Hx      Hypertension Neg Hx      Stroke Neg Hx      Esophageal cancer Neg Hx       Social History     Tobacco Use    Smoking status: Never     Passive exposure: Never    Smokeless tobacco: Never   Substance Use Topics    Alcohol use: No    Drug use: No        Review of Systems:  Review of Systems   Constitutional:  Negative for chills, fever and weight loss.       OBJECTIVE:     Vital Signs (Most Recent)  BP (!) 143/91 (BP Location: Right arm, Patient Position: Sitting, BP Method: Medium (Automatic))   Pulse 76   Ht 5' 6" (1.676 m)   Wt 73 kg (160 lb 15 oz)   LMP 05/30/2000 (Approximate)   BMI 25.98 kg/m²     Physical Exam:  General: Black or  female in no distress   Neuro: Alert and oriented to person, place, and time.  Moves all extremities.     HEENT: No icterus.  Trachea midline  Skin: Warm dry and intact, No visible rashes, no jaundice    Labs reviewed today:  Lab Results   Component Value Date    WBC 4.15 08/06/2024    HGB 12.2 08/06/2024    HCT 40.2 08/06/2024     08/06/2024    CHOL 232 (H) 08/06/2024    TRIG 82 08/06/2024    HDL 62 08/06/2024    ALT 15 08/06/2024    AST 15 08/06/2024     08/06/2024    K 4.4 08/06/2024     08/06/2024    CREATININE 0.7 08/06/2024    BUN 11 08/06/2024    CO2 26 08/06/2024    TSH 1.837 08/06/2024    INR 1.0 " 04/14/2005    HGBA1C 5.6 08/06/2024       Imaging reviewed today:  See HPI    Endoscopy reviewed today:  See HPI      ASSESSMENT/PLAN:     Diagnoses and all orders for this visit:    Other constipation    Left lower quadrant abdominal pain      79 yo F here for diverticulitis however it sounds like this was more for constipation. Pain free ever since she took dulcolax and had a BM.  For now high fiber discussed with plenty of water  F/u PRN  C scope due 2026      Charo Irvin, RAIMUNDOP-C  Colon and Rectal Surgery

## 2024-08-27 NOTE — PATIENT INSTRUCTIONS
Women need 25 grams of fiber per day, and men need 38 grams per day, according to the [place] [employer].    If you're not meeting your recommended daily dose of fiber via food, fiber supplementation is beneficial in helping meet those daily recommendations. Be sure to drink plenty of water while taking fiber supplementation. Make sure to read fiber supplementation drug label directions regarding when and how to take the supplementation.    Dietary fiber content of frequently consumed foods  Food Fiber, g/serving   Fruits   Apple (with skin) 3.5/1 medium-sized apple   Apricot (fresh) 1.8/3 apricots   Banana 2.5/1 banana   Cantaloupe 2.7/half edible portion   Dates 13.5/1 cup (chopped)   Grapefruit 1.6/half edible portion   Grapes 2.6/10 grapes   Oranges 2.6/1 orange   Peach (with skin) 2.1/1 peach   Pear (with skin) 4.6/1 pear   Pineapple 2.2/1 cup (diced)   Prunes 11.9/11 dried prunes   Raisins 2.2/packet   Strawberries 3.0/1 cup   Juices   Apple 0.74/1 cup   Grapefruit 1.0/1 cup   Grape 1.3/1 cup   Orange 1.0/1 cup   Vegetables   Cooked   Asparagus 1.5/7 sharp   Beans, string, green 3.4/1 cup   Broccoli 5.0/1 stalk   [place] sprouts 4.6/7-8 sprouts   Cabbage 2.9/1 cup (cooked)   Carrots 4.6/1 cup   Cauliflower 2.1/1 cup   Peas 7.2/1 cup (cooked)   Potato (with skin) 2.3/1 boiled   Spinach 4.1/1 cup (raw)   Squash, summer 3.4/1 cup (cooked, diced)   Sweet potatoes 2.7/1 baked   Zucchini 4.2/1 cup (cooked, diced)   Raw   Cucumber 0.2/6-8 slices with skin   Lettuce 2.0/1 wedge iceberg   Mushrooms 0.8/half cup (sliced)   Onions 1.3/1 cup   Peppers, green 1.0/1 pod   Tomato 1.8/1 tomato   Spinach 8.0/1 cup (chopped)   Legumes   Baked beans 18.6/1 cup   Dried peas 4.7/half cup (cooked)   Kidney beans 7.4/half cup (cooked)   Lima beans 2.6/half cup (cooked)   Lentils 1.9/half cup (cooked)   Breads, pastas, and flours   Bagels 1.1/half bagel   Bran muffins 6.3/muffin   Cracked wheat 4.1/slice   Oatmeal 5.3/1 cup    Pumpernickel bread 1.0/slice   White bread 0.55/slice   Whole-wheat bread 1.66/slice   Pasta and rice cooked   Macaroni 1.0/1 cup (cooked)   Rice, brown 2.4/1 cup (cooked)   Rice, polished 0.6/1 cup (cooked)   Spaghetti (regular) 1.0/1 cup (cooked)   Flours and grains   Bran, oat 8.3/oz   Bran, wheat 12.4/oz   Rolled oats 13.7/1 cup (cooked)   Nuts   Almonds 3.6/half cup (slivered)   Peanuts 11.7/1 cup

## 2024-10-14 DIAGNOSIS — M25.561 PAIN IN BOTH KNEES, UNSPECIFIED CHRONICITY: ICD-10-CM

## 2024-10-14 DIAGNOSIS — M17.12 PRIMARY OSTEOARTHRITIS OF LEFT KNEE: ICD-10-CM

## 2024-10-14 DIAGNOSIS — M17.11 PRIMARY OSTEOARTHRITIS OF RIGHT KNEE: ICD-10-CM

## 2024-10-14 DIAGNOSIS — M25.562 PAIN IN BOTH KNEES, UNSPECIFIED CHRONICITY: ICD-10-CM

## 2024-10-14 RX ORDER — DICLOFENAC SODIUM 75 MG/1
75 TABLET, DELAYED RELEASE ORAL 2 TIMES DAILY
Qty: 60 TABLET | Refills: 0 | Status: SHIPPED | OUTPATIENT
Start: 2024-10-14

## 2024-11-11 DIAGNOSIS — M25.562 PAIN IN BOTH KNEES, UNSPECIFIED CHRONICITY: ICD-10-CM

## 2024-11-11 DIAGNOSIS — M17.12 PRIMARY OSTEOARTHRITIS OF LEFT KNEE: ICD-10-CM

## 2024-11-11 DIAGNOSIS — M17.11 PRIMARY OSTEOARTHRITIS OF RIGHT KNEE: ICD-10-CM

## 2024-11-11 DIAGNOSIS — M25.561 PAIN IN BOTH KNEES, UNSPECIFIED CHRONICITY: ICD-10-CM

## 2024-11-11 RX ORDER — DICLOFENAC SODIUM 75 MG/1
75 TABLET, DELAYED RELEASE ORAL 2 TIMES DAILY
Qty: 60 TABLET | Refills: 0 | Status: SHIPPED | OUTPATIENT
Start: 2024-11-11

## 2024-11-21 ENCOUNTER — TELEPHONE (OUTPATIENT)
Dept: SPORTS MEDICINE | Facility: CLINIC | Age: 79
End: 2024-11-21
Payer: MEDICARE

## 2024-11-21 NOTE — TELEPHONE ENCOUNTER
Spoke to the patient in regard to her appointment with Millicent Marquez PA-C  on 11/22. While on the call I confirmed the patient is experiencing pain in the right knee and would like to proceed with Iovera procedure again. She requested to proceed with Iovera during her scheduled visit on 11/22. She was then informed the Iovera will require prior auth. She was also informed that if she does not have any new injuries we can change her appointment to a virtual appointment on 11/22 and she can discuss moving forward with iovera. The patient declined and new injuries and agreed to  change appointment to virtual.

## 2024-11-21 NOTE — TELEPHONE ENCOUNTER
----- Message from Jennifer sent at 11/21/2024 12:30 PM CST -----  Regarding: Appt  Contact: Pt [phone]  Pt is returning call to discuss plan of care, Pt is requesting Alistair Injection for right knee at time of visit. Please call pt @[phone]

## 2024-11-21 NOTE — TELEPHONE ENCOUNTER
Left a voicemail for the patient in regard to their appointment tomorrow. Purpose of the call was to confirm why the patient need to be seen. The patient can call the office at [phone].

## 2024-11-22 ENCOUNTER — OFFICE VISIT (OUTPATIENT)
Dept: SPORTS MEDICINE | Facility: CLINIC | Age: 79
End: 2024-11-22
Payer: MEDICARE

## 2024-11-22 DIAGNOSIS — M17.11 PRIMARY OSTEOARTHRITIS OF RIGHT KNEE: Primary | ICD-10-CM

## 2024-11-22 NOTE — PATIENT INSTRUCTIONS
SO WHAT'S IN MY KNEE?    You've got ZILRETTA (triamcinolone acetonide extended-release injectable suspension). That means you've got microspheres -- tiny particles that deliver medication for about 3 months.     Zilretta is the first and only FDA-approved treatment for osteoarthritis knee pain to use extended release microsphere technology.    ZILRETTA microspheres slowly release pain medication for about 3 months:      What microspheres are doing:  The microspheres slowly and continually release medicine for about 3 months.  They stay in your knee joint -- right where the pain is.  After they've released all their medicine, they turn into carbon dioxide and water, which are found naturally in the body.    After 3 months, they're gone.  This would be a good time to reassess your pain level.    IMPORTANT RISK INFORMATION    What should you tell your doctor BEFORE receiving a ZILRETTA injection?  Tell your doctor about all of the medications you are taking (including both prescription and over-the-counter medicines) and about any medical conditions, especially if you have high blood pressure, heart disease, ulcers, diverticulitis or other gastrointestinal disorders, kidney problems, diabetes, glaucoma, behavior or mood disorders, and/or infections.

## 2024-11-22 NOTE — PROGRESS NOTES
Telemedicine/Virtual Visit Documentation:     The patient location is: home- f/u right knee pain. Did well with recent Iovera procedure on right knee on 6/5/24. Pain increases with weather changes. She reports approximately 3+ months relief of pain. She would like to discuss continued treatment options of right knee.    The chief complaint leading to consultation is: see HPI from 5/9/24  Patient presents for follow-up s/p viscosupplementation of bilateral knee with myself on 2/8/2024. Reports symptoms showed no change with viscosupplementation in right knee, and previously only one day relief with CSI in right knee. Worse with any activity to the right knee, including sit-to-stand and going down stairs, similar to previously documented symptoms. Denies new injury or trauma.  Patient presents to discuss continued treatment options.  She has not tried physical therapy and was started on diclofenac a few weeks ago. Pain in right knee is 10/10. She is a  and has to climb stairs for work. She would like to discuss other conservative treatment options. She is not interested in discussing surgery at this time.       Previous HPI: Patient who works as a  presents to clinic with bilateral knee pain x 1 year. R>L. Denies a specific CAMMY. Pain is located along the medial aspect of bilateral knees. Pain increases with prolonged sitting and at night. No previous history of injections or surgeries in her knees. She has been taking Ibuprofen, Advil, voltaren, and naproxen as needed with minimal relief of pain. She reports occasional buckling of her knees but denies mechanical symptoms. She also experiences intermittent effusions.     VISIT TYPE X   Virtual visit with synchronous audio and video X   Telephone E/M service      Total time spent with patient: see X edith on chart below.   More than half of the time was spent counseling or coordinating care including prognosis, differential diagnosis,  risks and benefits of treatment, instructions, compliance risk reductions     EST MINUTES X   58706 5    84960 10    00295 15 X   99214 25    76237 40    NEW     55821 10    84965 20    97083 30    30764 45    36162 60    PHONE      5-10    86265 11-20    01515 21-30      Assessment:  Primary osteoarthritis right knee  Chronic right knee pain    Plan:  We have discussed a variety of treatment options including medications, injections, physical therapy and other alternative treatments. I also explained the indications, risks and benefits of surgery. Pt agrees and would like to proceed with this. Given she did not receive any relief with short acting CSI for adequate time, discussed Zilretta injection vs repeating Iovera. Information provided on Zilretta. She would like to try Zilretta injection.      I made the decision to obtain old records of the patient including previous notes and imaging. I independently reviewed and interpreted lab results today as well as prior imaging.  Reviewed with patient in detail.     1. Diclofenac 75 mg BID PRN. Patient understands to take with food and/or OTC prilosec to decrease GI side effects.  2.  Ice compress to the affected area 2-3x a day for 15-20 minutes as needed for pain management.  3. Authorization placed for right knee Zilretta injection.  4. RTC to see Millicent Marquez PA-C in 3 weeks for right knee Zilretta injection.

## 2024-12-16 ENCOUNTER — TELEPHONE (OUTPATIENT)
Dept: SPORTS MEDICINE | Facility: CLINIC | Age: 79
End: 2024-12-16
Payer: MEDICARE

## 2024-12-16 ENCOUNTER — OFFICE VISIT (OUTPATIENT)
Dept: SPORTS MEDICINE | Facility: CLINIC | Age: 79
End: 2024-12-16
Payer: MEDICARE

## 2024-12-16 VITALS
HEIGHT: 66 IN | DIASTOLIC BLOOD PRESSURE: 86 MMHG | HEART RATE: 74 BPM | SYSTOLIC BLOOD PRESSURE: 145 MMHG | WEIGHT: 165.13 LBS | BODY MASS INDEX: 26.54 KG/M2

## 2024-12-16 DIAGNOSIS — M17.11 PRIMARY OSTEOARTHRITIS OF RIGHT KNEE: Primary | ICD-10-CM

## 2024-12-16 PROCEDURE — 99999 PR PBB SHADOW E&M-EST. PATIENT-LVL III: CPT | Mod: PBBFAC,,, | Performed by: PHYSICIAN ASSISTANT

## 2024-12-16 PROCEDURE — 20610 DRAIN/INJ JOINT/BURSA W/O US: CPT | Mod: RT,S$GLB,, | Performed by: PHYSICIAN ASSISTANT

## 2024-12-16 PROCEDURE — 99499 UNLISTED E&M SERVICE: CPT | Mod: 25,S$GLB,, | Performed by: PHYSICIAN ASSISTANT

## 2024-12-16 NOTE — TELEPHONE ENCOUNTER
Spoke to the patient in regard to handicap form. Informed the patient that I will have Millicent Marquez PA-C  a sign the form and bring it to the lobby.

## 2024-12-16 NOTE — PROGRESS NOTES
Patient is here for follow up of right knee arthritis. Pt is requesting right knee [facility] injection. [facility] Has failed other conservative modalities including NSAIDS, activity modification, weight loss.    The prior shot was tolerated well.    PHYSICAL EXAMINATION:     General: The patient is alert and oriented x 3. Mood is pleasant.   Observation of ears, eyes and nose reveals no gross abnormalities. No   labored breathing observed.     No signs of infection or adverse reaction to knee.    PROCEDURE NOTE:  Aspiration/Injection Procedure right knee    A time out was performed, including verification of patient ID, procedure, site and side, availability of information and equipment, review of safety issues, and agreement with consent, the procedure site was marked.    Aspiration:  After time out was performed, the patient was prepped aseptically with povidone-iodine swabsticks. 9cc's of normal joint fluid was aspirated from the Superolateral  aspect of the right Knee Joint using a 22g x 1.5 needle in sterile fashion without complication.     Injection:  Following aspiration, a diagnostic and therapeutic injection of 5cc Zilretta was given under sterile technique in the supine position.     Kj Chávez had no adverse reactions to the medication. Pain decreased. She was instructed to apply ice to the joint for 20 minutes and avoid strenuous activities for 24-36 hours following the injection. She was warned of possible blood sugar and/or blood pressure changes during that time. Following that time, she can resume regular activities.     RTC in 3 months with Millicent Marquez PA-C for follow up.  All questions were answered, pt will contact us for questions or concerns in the interim.

## 2024-12-16 NOTE — TELEPHONE ENCOUNTER
----- Message from Matteo sent at 12/16/2024  1:29 PM CST -----  Type:  Needs Medical Advice    Who Called: pt    Would the patient rather a call back or a response via [facility]? call  Best Call Back Number:  [phone]  Additional Information: Pt would like a call from nurse in office regarding paperwork that she is needing to have completed by  for an handicap plate she is currently in the area would like to know can she stop by to retreive please call

## 2024-12-27 ENCOUNTER — OFFICE VISIT (OUTPATIENT)
Dept: UROLOGY | Facility: CLINIC | Age: 79
End: 2024-12-27
Payer: MEDICARE

## 2024-12-27 VITALS — BODY MASS INDEX: 26.1 KG/M2 | WEIGHT: 161.69 LBS

## 2024-12-27 DIAGNOSIS — R39.15 URINARY URGENCY: ICD-10-CM

## 2024-12-27 DIAGNOSIS — N39.0 RECURRENT UTI: Primary | ICD-10-CM

## 2024-12-27 DIAGNOSIS — R10.9 FLANK PAIN: ICD-10-CM

## 2024-12-27 PROCEDURE — 87086 URINE CULTURE/COLONY COUNT: CPT | Performed by: UROLOGY

## 2024-12-27 PROCEDURE — 99999 PR PBB SHADOW E&M-EST. PATIENT-LVL II: CPT | Mod: PBBFAC,,, | Performed by: UROLOGY

## 2024-12-27 RX ORDER — CEPHALEXIN 500 MG/1
500 CAPSULE ORAL EVERY 8 HOURS
Qty: 21 CAPSULE | Refills: 0 | Status: SHIPPED | OUTPATIENT
Start: 2024-12-27 | End: 2025-01-03

## 2024-12-27 RX ORDER — FLUCONAZOLE 100 MG/1
100 TABLET ORAL DAILY
Qty: 3 TABLET | Refills: 0 | Status: SHIPPED | OUTPATIENT
Start: 2024-12-27 | End: 2024-12-30

## 2024-12-27 NOTE — PROGRESS NOTES
Subjective:       Patient ID: Kj Chávez is a 79 y.o. female who was referred by Self, Aaareferral    Chief Complaint:   Chief Complaint   Patient presents with    Bladder Pain     Burn when urinate; sharp pain lower abd last night       Recurrent UTI  She has been experiencing pressure and itching for a few weeks.  She started with left sided pain last night and she became concerned.  She denies hematuria or fever.  She has these episodes periodically.      She denies previous  or Gyn surgeries.      ACTIVE MEDICAL ISSUES:  Patient Active Problem List   Diagnosis    Alpha thalassemia trait    History of colon polyps    Aortic atherosclerosis    History of benign gastric tumor: see notes from EGD 2015, benign.  EGD 8/17 stable    Osteoporosis    Renal cysts, acquired, bilateral    Decreased strength of trunk and back    Decreased range of motion of lumbar spine    Hyperparathyroidism, unspecified    Fall       PAST MEDICAL HISTORY  Past Medical History:   Diagnosis Date    Allergy     Cataract     Colon polyp     Gastric nodule     GERD (gastroesophageal reflux disease)     Hypertension     mild, resolved     Osteoporosis        PAST SURGICAL HISTORY:  Past Surgical History:   Procedure Laterality Date    COLONOSCOPY N/A 2/23/2016    Procedure: COLONOSCOPY;  Surgeon: Christian Medina MD;  Location: [facility] ([address] [place] [address]);  Service: Endoscopy;  Laterality: N/A;    COLONOSCOPY N/A 7/19/2021    Procedure: COLONOSCOPY;  Surgeon: Maile Lal MD;  Location: [facility] ([address] [place] [address]);  Service: Endoscopy;  Laterality: N/A;  Vaccinated 2/3/21 see Immunizations, prep instr emailed -ml    UPPER GASTROINTESTINAL ENDOSCOPY         SOCIAL HISTORY:  Social History     Tobacco Use    Smoking status: Never     Passive exposure: Never    Smokeless tobacco: Never   Substance Use Topics    Alcohol use: No    Drug use: No       FAMILY HISTORY:  Family History   Problem Relation Name Age of Onset    Colon cancer Mother  80     Cancer Mother          colon    No Known Problems Father      Breast cancer Sister  50    Cancer Sister          breast    No Known Problems Daughter      No Known Problems Son      No Known Problems Daughter      Ovarian cancer Neg Hx      Diabetes Neg Hx      Hypertension Neg Hx      Stroke Neg Hx      Esophageal cancer Neg Hx         ALLERGIES AND MEDICATIONS: updated and reviewed.  Review of patient's allergies indicates:   Allergen Reactions    Atorvastatin      Liver enzyme elevation    Sulfa (sulfonamide antibiotics) Swelling    Codeine Anxiety     Current Outpatient Medications   Medication Sig    calcium citrate-vitamin D3 315-200 mg (CITRACAL+D) 315-200 mg-unit per tablet Take 1 tablet by mouth 2 (two) times daily.    cephALEXin (KEFLEX) 500 MG capsule Take 1 capsule (500 mg total) by mouth every 8 (eight) hours. for 7 days    cetirizine (ZYRTEC) 10 MG tablet Take 10 mg by mouth once daily.    diclofenac (VOLTAREN) 75 MG EC tablet Take 1 tablet by mouth twice daily    fish oil-omega-3 fatty acids 300-1,000 mg capsule Take 2 g by mouth once daily.    fluconazole (DIFLUCAN) 100 MG tablet Take 1 tablet (100 mg total) by mouth once daily. for 3 days    fluticasone (FLONASE) 50 mcg/actuation nasal spray 2 sprays by Each Nare route once daily.    ondansetron (ZOFRAN-ODT) 4 MG TbDL Take 1 tablet (4 mg total) by mouth every 8 (eight) hours as needed (nausea).    oxybutynin (DITROPAN-XL) 5 MG TR24 Take 1 tablet (5 mg total) by mouth once daily.     No current facility-administered medications for this visit.       Review of Systems   Constitutional:  Negative for chills, fatigue and fever.   Respiratory:  Negative for chest tightness and shortness of breath.    Cardiovascular:  Negative for chest pain.   Gastrointestinal:  Negative for abdominal distention, constipation, nausea and vomiting.   Genitourinary:  Positive for flank pain and pelvic pain. Negative for difficulty urinating, dysuria, frequency, hematuria  and urgency.   Musculoskeletal:  Negative for arthralgias.   Neurological:  Negative for light-headedness.   Psychiatric/Behavioral:  Negative for confusion.        Objective:      Vitals:    12/27/24 1323   Weight: 73.4 kg (161 lb 11.3 oz)     Physical Exam  Vitals and nursing note reviewed.   Constitutional:       Appearance: She is well-developed.   HENT:      Head: Normocephalic.   Eyes:      Conjunctiva/sclera: Conjunctivae normal.   Neck:      Thyroid: No thyromegaly.      Trachea: No tracheal deviation.   Cardiovascular:      Rate and Rhythm: Normal rate.      Pulses: Normal pulses.      Heart sounds: Normal heart sounds.   Pulmonary:      Effort: Pulmonary effort is normal. No respiratory distress.      Breath sounds: Normal breath sounds. No wheezing.   Abdominal:      General: There is no distension.      Palpations: Abdomen is soft. There is no mass.      Tenderness: There is no abdominal tenderness. There is no guarding or rebound.      Hernia: No hernia is present.   Musculoskeletal:         General: No tenderness. Normal range of motion.      Cervical back: Normal range of motion.   Lymphadenopathy:      Cervical: No cervical adenopathy.   Skin:     General: Skin is warm and dry.      Findings: No erythema or rash.   Neurological:      Mental Status: She is alert and oriented to person, place, and time.   Psychiatric:         Behavior: Behavior normal.         Thought Content: Thought content normal.         Judgment: Judgment normal.         Urine dipstick shows positive for leukocytes.  Micro exam: 10 WBC's per HPF.    Assessment:       1. Recurrent UTI    2. Flank pain    3. Urinary urgency          Plan:       1. Recurrent UTI    - POCT urinalysis, dipstick or tablet reag  - CULTURE, URINE  - fluconazole (DIFLUCAN) 100 MG tablet; Take 1 tablet (100 mg total) by mouth once daily. for 3 days  Dispense: 3 tablet; Refill: 0  - cephALEXin (KEFLEX) 500 MG capsule; Take 1 capsule (500 mg total) by mouth  every 8 (eight) hours. for 7 days  Dispense: 21 capsule; Refill: 0    2. Flank pain    - US Retroperitoneal Complete; Future    3. Urinary urgency  Oxybutynin            Follow up in about 6 weeks (around 2/7/2025) for Follow up Established, Review X-ray.

## 2024-12-28 LAB — BACTERIA UR CULT: NORMAL

## 2025-01-29 NOTE — PROGRESS NOTES
Problem: Discharge Planning  Goal: Discharge to home or other facility with appropriate resources  Outcome: Progressing     Problem: Pain  Goal: Verbalizes/displays adequate comfort level or baseline comfort level  Outcome: Progressing     Problem: Safety - Adult  Goal: Free from fall injury  Outcome: Progressing Subjective:      Patient ID: Kj Chávez is a 79 y.o. female.    Chief Complaint:  Hyperparathyroidism and Osteoporosis    History of Present Illness  Kj Chávez is here for follow up of Osteoporosis and Hyperparathyroidism.     With regards to Osteoporosis:     BMD:   7/2023  The bone mineral density within the lumbar spine measured from L1 through L4 is equal to 0.912 g/cm squared with a T score of -2.3 and a Z score of -1.4.  The left femoral neck bone mineral density is equal to 0.678 g/cm squared with a T score of -2.6 and a Z score of -1.6.  The right femoral neck bone mineral density is equal to 0.675 g/cm squared with a T score of -2.6 and a Z score of -1.7.  The left total hip bone mineral density is equal to 0.752 g/cm squared with a T score of -2.0 and a Z score of -1.3.  The right total hip bone mineral density is equal to 0.764 g/cm squared with a T score of -1.9 and a Z score of -1.2.  There is a 8.8% risk of a major osteoporotic fracture and a 2.9% risk of hip fracture in the next 10 years (FRAX).  Impression:  Osteoporosis.    Medications:   Fosamax   Start Date: 2014 - took for about 2 weeks - tongue swelling     Daily intake of calcium is: Calcium 400mg daily   Daily intake of vitamin D:  OTC Vit D3 2000iu daily    Weight bearing exercise: walking  Falls:   5/2023 - tripped over someone's foot    Fractures: Denies any previous fracture.   Significant height loss (>2 inches): Denies    Family history: Denies    Menopause: ~57y.o.  No HRT.    Tobacco Use: Denies  Alcohol Use: Rarely   Glucocorticoid History: Yes - injections in her knee   Anticoagulant Use: Denies  GERD/PPI Use: Reflux, takes medication PRN  History of Malabsorption: Denies  Antiseizure Medications: Denies  History of Thyroid Disease: Denies  Kidney Stones/ Kidney Disease: Denies  Family history of kidney stones: Denies  Family history of bone disease or fracture: Denies  MI: Denies  Stroke: Denies  Dental Visit:  overdue for appointment       Lab Results   Component Value Date    CALCIUM 9.6 08/06/2024    PHOS 3.0 07/03/2023     Vit D, 25-Hydroxy   Date Value Ref Range Status   07/03/2023 66 30 - 96 ng/mL Final     Comment:     Vitamin D deficiency.........<10 ng/mL                              Vitamin D insufficiency......10-29 ng/mL       Vitamin D sufficiency........> or equal to 30 ng/mL  Vitamin D toxicity............>100 ng/mL       With regards to Hyperparathyroidism:    Lab Results   Component Value Date    PTH 98.4 (H) 07/03/2023    CALCIUM 9.6 08/06/2024    PHOS 3.0 07/03/2023     Lab Results   Component Value Date    ALBUMIN 3.9 08/06/2024          Latest Reference Range & Units 07/28/21 10:12   CREATININE, URINE (SEND OUT) 15.0 - 325.0 mg/dL 69.0   Calcium, Urine 0.0 - 15.0 mg/dL 19.8 (H)   Calcium, 24 Hr Urine 4 - 12 mg/Hr 11   Calcium, Urine (mg/spec) mg/Spec 257   Creatinine, Urinr (mg/spec) mg/Spec 897.0   Creatinine, Timed Urine 40.0 - 75.0 mg/Hr 37.4 (L)   (H): Data is abnormally high  (L): Data is abnormally low    Thyroid US  7/28/2022  FINDINGS:  Right lobe measures 4.2 x 0.8 x 1 cm.  Left lobe measures 3.4 x 1 x 1.1 cm.  Subcentimeter hypoechoic nodule in the left upper lobe may be solid measuring 0.4 cm.  Additional benign colloid cysts scattered in the left lobe.  Impression:  Subcentimeter thyroid nodules do not meet criteria for follow-up or FNA.  No suspicious nodule adjacent to the thyroid to suggest parathyroid adenoma in this patient with provided history of hyperparathyroidism.    Daily intake of calcium is: Calcium 400mg daily   Daily intake of vitamin D:  OTC Vit D3 2000iu daily    BMD:  see above    Denies muscle aches or pains.  Reports polyuria, constipation - inconsistent.   Denies fractures, height loss or kidney stones.  Denies history of renal disease.  Denies family history of hyperparathyroidism or calcium problems.  Denies HCTZ., lithium, or chlorthiadone use.      Review of  "Systems  As above      Visit Vitals  /84 (BP Location: Left arm, Patient Position: Sitting)   Ht 5' 6" (1.676 m)   Wt 72 kg (158 lb 11.7 oz)   LMP 05/30/2000 (Approximate)   BMI 25.62 kg/m²       Body mass index is 25.62 kg/m².    Lab Review:   Lab Results   Component Value Date    HGBA1C 5.6 08/06/2024    HGBA1C 5.6 07/03/2023    HGBA1C 5.7 (H) 07/25/2022       Lab Results   Component Value Date    CHOL 232 (H) 08/06/2024    HDL 62 08/06/2024    LDLCALC 153.6 08/06/2024    TRIG 82 08/06/2024    CHOLHDL 26.7 08/06/2024     Lab Results   Component Value Date     08/06/2024    K 4.4 08/06/2024     08/06/2024    CO2 26 08/06/2024    GLU 95 08/06/2024    BUN 11 08/06/2024    CREATININE 0.7 08/06/2024    CALCIUM 9.6 08/06/2024    PROT 6.9 08/06/2024    ALBUMIN 3.9 08/06/2024    BILITOT 0.4 08/06/2024    ALKPHOS 100 08/06/2024    AST 15 08/06/2024    ALT 15 08/06/2024    ANIONGAP 8 08/06/2024    ESTGFRAFRICA >60.0 07/25/2022    EGFRNONAA >60.0 07/25/2022    TSH 1.837 08/06/2024     Vit D, 25-Hydroxy   Date Value Ref Range Status   07/03/2023 66 30 - 96 ng/mL Final     Comment:     Vitamin D deficiency.........<10 ng/mL                              Vitamin D insufficiency......10-29 ng/mL       Vitamin D sufficiency........> or equal to 30 ng/mL  Vitamin D toxicity............>100 ng/mL       Assessment and Plan     1. Age-related osteoporosis without current pathological fracture  DXA Bone Density Axial Skeleton 1 or more sites      2. Hyperparathyroidism, unspecified  PTH, Intact    Renal Function Panel    Vitamin D    TSH    US Soft Tissue Head Neck      3. Abnormal findings on diagnostic imaging of other specified body structures  TSH          Osteoporosis  -- Risks include menopause, ETOH,  +FH, hyperparathyroidism.  -- Reviewed basics of quantity versus quality.  -- Reassuring they are not fracturing.  -- Recommend:  -Pharmacological therapy is recommended for patients with osteopenia if the 10 year " probability of a hip fracture is >3% and 10 year probability of other major osteoporotic fractures is >20%.  Treatment options and potential side effects discussed for PO bisphosphonates, reclast, Denosumab, and Teriparatide.   -Treatment: discussed. She wanted to have her repeat BMD done then start Prolia every 6 months. Discussed it is unlikely waiting for the repeat test will change anything but this is her preference.   -Calcium and Vitamin D RDD provided.  -Exercise: recommended  -Fall precautions made in the home.  -Alerted that if dental work needs to be done it should be done prior to initiating therapy. Dental health: needs to make an appointment.  -- Repeat DEXA scan 7/2025.    Hyperparathyroidism, unspecified  -- Intermittent hypercalcemia dating back to ~ 2017.  -- 7/2021 24 urine calcium and creatinine exclude FHH.  -- Neck ultrasound to evaluate for parathyroid adenoma completed 7/2022.  -- Discussed indications for surgery if primary hyperparathyroidism proven.  -- Avoid dehydration, excessive calcium supplementation and meds (HCTZ) that can worsen hypercalcemia.  -- Patient meets surgical criteria based on Osteoporosis - she would not want to proceed with surgery. Monitor labs and treat Osteoporosis.  -- Repeat RFP, vitamin D, PTH.       Follow up in about 6 months (around 8/4/2025).    Visit today included increased complexity associated with the care of the problems addressed and managing the longitudinal care of the patient due to the serious and/or complex managed problems.

## 2025-01-31 ENCOUNTER — HOSPITAL ENCOUNTER (OUTPATIENT)
Dept: RADIOLOGY | Facility: OTHER | Age: 80
Discharge: HOME | End: 2025-01-31
Attending: UROLOGY
Payer: MEDICARE

## 2025-01-31 DIAGNOSIS — R10.9 FLANK PAIN: ICD-10-CM

## 2025-01-31 PROCEDURE — 76770 US EXAM ABDO BACK WALL COMP: CPT | Mod: TC

## 2025-01-31 PROCEDURE — 76770 US EXAM ABDO BACK WALL COMP: CPT | Mod: 26,,, | Performed by: INTERNAL MEDICINE

## 2025-02-04 ENCOUNTER — LAB VISIT (OUTPATIENT)
Dept: LAB | Facility: HOSPITAL | Age: 80
End: 2025-02-04
Payer: MEDICARE

## 2025-02-04 ENCOUNTER — OFFICE VISIT (OUTPATIENT)
Dept: ENDOCRINOLOGY | Facility: CLINIC | Age: 80
End: 2025-02-04
Payer: MEDICARE

## 2025-02-04 VITALS
DIASTOLIC BLOOD PRESSURE: 84 MMHG | SYSTOLIC BLOOD PRESSURE: 132 MMHG | WEIGHT: 158.75 LBS | BODY MASS INDEX: 25.51 KG/M2 | HEIGHT: 66 IN

## 2025-02-04 DIAGNOSIS — E21.3 HYPERPARATHYROIDISM, UNSPECIFIED: ICD-10-CM

## 2025-02-04 DIAGNOSIS — R93.89 ABNORMAL FINDINGS ON DIAGNOSTIC IMAGING OF OTHER SPECIFIED BODY STRUCTURES: ICD-10-CM

## 2025-02-04 DIAGNOSIS — M81.0 AGE-RELATED OSTEOPOROSIS WITHOUT CURRENT PATHOLOGICAL FRACTURE: Primary | Chronic | ICD-10-CM

## 2025-02-04 LAB
25(OH)D3+25(OH)D2 SERPL-MCNC: 74 NG/ML (ref 30–96)
ALBUMIN SERPL BCP-MCNC: 3.8 G/DL (ref 3.5–5.2)
ANION GAP SERPL CALC-SCNC: 9 MMOL/L (ref 8–16)
BUN SERPL-MCNC: 16 MG/DL (ref 8–23)
CALCIUM SERPL-MCNC: 9.6 MG/DL (ref 8.7–10.5)
CHLORIDE SERPL-SCNC: 102 MMOL/L (ref 95–110)
CO2 SERPL-SCNC: 28 MMOL/L (ref 23–29)
CREAT SERPL-MCNC: 0.7 MG/DL (ref 0.5–1.4)
EST. GFR  (NO RACE VARIABLE): >60 ML/MIN/1.73 M^2
GLUCOSE SERPL-MCNC: 99 MG/DL (ref 70–110)
PHOSPHATE SERPL-MCNC: 3.5 MG/DL (ref 2.7–4.5)
POTASSIUM SERPL-SCNC: 4.3 MMOL/L (ref 3.5–5.1)
PTH-INTACT SERPL-MCNC: 88.6 PG/ML (ref 9–77)
SODIUM SERPL-SCNC: 139 MMOL/L (ref 136–145)
TSH SERPL DL<=0.005 MIU/L-ACNC: 2.15 UIU/ML (ref 0.4–4)

## 2025-02-04 PROCEDURE — 36415 COLL VENOUS BLD VENIPUNCTURE: CPT | Performed by: NURSE PRACTITIONER

## 2025-02-04 PROCEDURE — 84443 ASSAY THYROID STIM HORMONE: CPT | Performed by: NURSE PRACTITIONER

## 2025-02-04 PROCEDURE — 99999 PR PBB SHADOW E&M-EST. PATIENT-LVL III: CPT | Mod: PBBFAC,,, | Performed by: NURSE PRACTITIONER

## 2025-02-04 PROCEDURE — 83970 ASSAY OF PARATHORMONE: CPT | Performed by: NURSE PRACTITIONER

## 2025-02-04 PROCEDURE — 3075F SYST BP GE 130 - 139MM HG: CPT | Mod: CPTII,S$GLB,, | Performed by: NURSE PRACTITIONER

## 2025-02-04 PROCEDURE — 1159F MED LIST DOCD IN RCRD: CPT | Mod: CPTII,S$GLB,, | Performed by: NURSE PRACTITIONER

## 2025-02-04 PROCEDURE — 3288F FALL RISK ASSESSMENT DOCD: CPT | Mod: CPTII,S$GLB,, | Performed by: NURSE PRACTITIONER

## 2025-02-04 PROCEDURE — 1160F RVW MEDS BY RX/DR IN RCRD: CPT | Mod: CPTII,S$GLB,, | Performed by: NURSE PRACTITIONER

## 2025-02-04 PROCEDURE — G2211 COMPLEX E/M VISIT ADD ON: HCPCS | Mod: S$GLB,,, | Performed by: NURSE PRACTITIONER

## 2025-02-04 PROCEDURE — 80069 RENAL FUNCTION PANEL: CPT | Performed by: NURSE PRACTITIONER

## 2025-02-04 PROCEDURE — 82306 VITAMIN D 25 HYDROXY: CPT | Performed by: NURSE PRACTITIONER

## 2025-02-04 PROCEDURE — 3079F DIAST BP 80-89 MM HG: CPT | Mod: CPTII,S$GLB,, | Performed by: NURSE PRACTITIONER

## 2025-02-04 PROCEDURE — 1101F PT FALLS ASSESS-DOCD LE1/YR: CPT | Mod: CPTII,S$GLB,, | Performed by: NURSE PRACTITIONER

## 2025-02-04 PROCEDURE — 1126F AMNT PAIN NOTED NONE PRSNT: CPT | Mod: CPTII,S$GLB,, | Performed by: NURSE PRACTITIONER

## 2025-02-04 PROCEDURE — 99214 OFFICE O/P EST MOD 30 MIN: CPT | Mod: S$GLB,,, | Performed by: NURSE PRACTITIONER

## 2025-02-04 NOTE — ASSESSMENT & PLAN NOTE
-- Intermittent hypercalcemia dating back to ~ 2017.  -- 7/2021 24 urine calcium and creatinine exclude FHH.  -- Neck ultrasound to evaluate for parathyroid adenoma completed 7/2022.  -- Discussed indications for surgery if primary hyperparathyroidism proven.  -- Avoid dehydration, excessive calcium supplementation and meds (HCTZ) that can worsen hypercalcemia.  -- Patient meets surgical criteria based on Osteoporosis - she would not want to proceed with surgery. Monitor labs and treat Osteoporosis.  -- Repeat RFP, vitamin D, PTH.

## 2025-02-04 NOTE — ASSESSMENT & PLAN NOTE
-- Risks include menopause, ETOH,  +FH, hyperparathyroidism.  -- Reviewed basics of quantity versus quality.  -- Reassuring they are not fracturing.  -- Recommend:  -Pharmacological therapy is recommended for patients with osteopenia if the 10 year probability of a hip fracture is >3% and 10 year probability of other major osteoporotic fractures is >20%.  Treatment options and potential side effects discussed for PO bisphosphonates, reclast, Denosumab, and Teriparatide.   -Treatment: discussed. She wanted to have her repeat BMD done then start Prolia every 6 months. Discussed it is unlikely waiting for the repeat test will change anything but this is her preference.   -Calcium and Vitamin D RDD provided.  -Exercise: recommended  -Fall precautions made in the home.  -Alerted that if dental work needs to be done it should be done prior to initiating therapy. Dental health: needs to make an appointment.  -- Repeat DEXA scan 7/2025.

## 2025-02-06 ENCOUNTER — PATIENT MESSAGE (OUTPATIENT)
Dept: BEHAVIORAL HEALTH | Facility: CLINIC | Age: 80
End: 2025-02-06
Payer: MEDICARE

## 2025-02-06 ENCOUNTER — OFFICE VISIT (OUTPATIENT)
Dept: INTERNAL MEDICINE | Facility: CLINIC | Age: 80
End: 2025-02-06
Payer: MEDICARE

## 2025-02-06 VITALS
BODY MASS INDEX: 25.66 KG/M2 | WEIGHT: 159.63 LBS | SYSTOLIC BLOOD PRESSURE: 132 MMHG | OXYGEN SATURATION: 95 % | HEIGHT: 66 IN | RESPIRATION RATE: 16 BRPM | DIASTOLIC BLOOD PRESSURE: 80 MMHG | HEART RATE: 59 BPM

## 2025-02-06 DIAGNOSIS — E78.2 MIXED HYPERLIPIDEMIA: ICD-10-CM

## 2025-02-06 DIAGNOSIS — M81.0 AGE-RELATED OSTEOPOROSIS WITHOUT CURRENT PATHOLOGICAL FRACTURE: ICD-10-CM

## 2025-02-06 DIAGNOSIS — Z86.018 HISTORY OF BENIGN GASTRIC TUMOR: ICD-10-CM

## 2025-02-06 DIAGNOSIS — Z12.31 BREAST CANCER SCREENING BY MAMMOGRAM: ICD-10-CM

## 2025-02-06 DIAGNOSIS — F43.21 GRIEF: ICD-10-CM

## 2025-02-06 DIAGNOSIS — Z12.11 SCREENING FOR COLON CANCER: ICD-10-CM

## 2025-02-06 DIAGNOSIS — Z00.00 ENCOUNTER FOR ANNUAL PHYSICAL EXAM: Primary | ICD-10-CM

## 2025-02-06 DIAGNOSIS — M79.10 MYALGIA DUE TO STATIN: ICD-10-CM

## 2025-02-06 DIAGNOSIS — R94.31 ABNORMAL EKG: ICD-10-CM

## 2025-02-06 DIAGNOSIS — Z00.00 LABORATORY EXAMINATION ORDERED AS PART OF A ROUTINE GENERAL MEDICAL EXAMINATION: ICD-10-CM

## 2025-02-06 DIAGNOSIS — E21.3 HYPERPARATHYROIDISM, UNSPECIFIED: ICD-10-CM

## 2025-02-06 DIAGNOSIS — Z13.6 ENCOUNTER FOR SCREENING FOR CARDIOVASCULAR DISORDERS: ICD-10-CM

## 2025-02-06 DIAGNOSIS — F41.9 ANXIETY: ICD-10-CM

## 2025-02-06 DIAGNOSIS — R73.03 PREDIABETES: ICD-10-CM

## 2025-02-06 DIAGNOSIS — I70.0 AORTIC ATHEROSCLEROSIS: ICD-10-CM

## 2025-02-06 DIAGNOSIS — T46.6X5A MYALGIA DUE TO STATIN: ICD-10-CM

## 2025-02-06 DIAGNOSIS — D56.3 ALPHA THALASSEMIA TRAIT: ICD-10-CM

## 2025-02-06 PROCEDURE — 1101F PT FALLS ASSESS-DOCD LE1/YR: CPT | Mod: CPTII,S$GLB,, | Performed by: INTERNAL MEDICINE

## 2025-02-06 PROCEDURE — 3288F FALL RISK ASSESSMENT DOCD: CPT | Mod: CPTII,S$GLB,, | Performed by: INTERNAL MEDICINE

## 2025-02-06 PROCEDURE — 1159F MED LIST DOCD IN RCRD: CPT | Mod: CPTII,S$GLB,, | Performed by: INTERNAL MEDICINE

## 2025-02-06 PROCEDURE — 1125F AMNT PAIN NOTED PAIN PRSNT: CPT | Mod: CPTII,S$GLB,, | Performed by: INTERNAL MEDICINE

## 2025-02-06 PROCEDURE — 3075F SYST BP GE 130 - 139MM HG: CPT | Mod: CPTII,S$GLB,, | Performed by: INTERNAL MEDICINE

## 2025-02-06 PROCEDURE — 99397 PER PM REEVAL EST PAT 65+ YR: CPT | Mod: S$GLB,,, | Performed by: INTERNAL MEDICINE

## 2025-02-06 PROCEDURE — 3079F DIAST BP 80-89 MM HG: CPT | Mod: CPTII,S$GLB,, | Performed by: INTERNAL MEDICINE

## 2025-02-06 PROCEDURE — 99999 PR PBB SHADOW E&M-EST. PATIENT-LVL V: CPT | Mod: PBBFAC,,, | Performed by: INTERNAL MEDICINE

## 2025-02-06 PROCEDURE — 1160F RVW MEDS BY RX/DR IN RCRD: CPT | Mod: CPTII,S$GLB,, | Performed by: INTERNAL MEDICINE

## 2025-02-06 NOTE — PROGRESS NOTES
Subjective:       Patient ID: Kj Chávez is a 79 y.o. female.    Chief Complaint: Annual Exam (Pt has been under a lot of stress. 3 deaths in family since December. ), Abdominal Pain (Began last Friday. 6/10 pain. Pain randomly began as she was sitting at home watching TV. Took over the counter gas X without relief), and Diarrhea (Began yesterday)      HPI  Kj Chávez is a 79 y.o. year old female with  HTN (lifestyle controlled), GERD, diverticulosis, osteoporosis , alpha thalassemia presents for annual exam. Abdominal discomfort x 1 week, bloating, slight nausea. Grieving loss of multiple family members,  was hospitalized this past month. Has been stressed. Would like to speak with therapist regarding grief and anxiety.    Review of Systems   Constitutional:  Negative for activity change, appetite change, fatigue, fever and unexpected weight change.   HENT:  Negative for congestion, hearing loss, postnasal drip, sneezing, sore throat, trouble swallowing and voice change.    Eyes:  Negative for pain and discharge.   Respiratory:  Negative for cough, choking, chest tightness, shortness of breath and wheezing.    Cardiovascular:  Negative for chest pain, palpitations and leg swelling.   Gastrointestinal:  Positive for abdominal distention. Negative for abdominal pain, blood in stool, constipation, diarrhea, nausea and vomiting.   Endocrine: Negative for polydipsia and polyuria.   Genitourinary:  Negative for difficulty urinating and flank pain.   Musculoskeletal:  Negative for arthralgias, back pain, joint swelling, myalgias and neck pain.   Skin:  Negative for rash.   Neurological:  Negative for dizziness, tremors, seizures, weakness, numbness and headaches.   Psychiatric/Behavioral:  Negative for agitation. The patient is not nervous/anxious.          Past Medical History:   Diagnosis Date    Allergy     Cataract     Colon polyp     Gastric nodule     GERD (gastroesophageal reflux disease)      "Hypertension     mild, resolved     Osteoporosis         Prior to Admission medications    Medication Sig Start Date End Date Taking? Authorizing Provider   calcium citrate-vitamin D3 315-200 mg (CITRACAL+D) 315-200 mg-unit per tablet Take 1 tablet by mouth 2 (two) times daily.   Yes Provider, Historical   cetirizine (ZYRTEC) 10 MG tablet Take 10 mg by mouth once daily.   Yes Provider, Historical   diclofenac (VOLTAREN) 75 MG EC tablet Take 1 tablet by mouth twice daily 11/11/24  Yes Abdulkadir Puentes PA-C   fish oil-omega-3 fatty acids 300-1,000 mg capsule Take 2 g by mouth once daily.   Yes Provider, Historical   fluticasone (FLONASE) 50 mcg/actuation nasal spray 2 sprays by Each Nare route once daily. 4/17/14  Yes Catherine Barrett MD   oxybutynin (DITROPAN-XL) 5 MG TR24 Take 1 tablet (5 mg total) by mouth once daily. 8/6/24 8/6/25 Yes Ezequiel Pastrana MD        Past medical history, surgical history, and family medical history reviewed and updated as appropriate.    Medications and allergies reviewed.     Objective:          Vitals:    02/06/25 1109   BP: 132/80   BP Location: Right arm   Patient Position: Sitting   Pulse: (!) 59   Resp: 16   SpO2: 95%   Weight: 72.4 kg (159 lb 9.8 oz)   Height: 5' 6" (1.676 m)     Body mass index is 25.76 kg/m².  Physical Exam  Constitutional:       Appearance: She is well-developed.   HENT:      Head: Normocephalic and atraumatic.   Eyes:      Extraocular Movements: Extraocular movements intact.   Cardiovascular:      Rate and Rhythm: Normal rate and regular rhythm.      Heart sounds: Normal heart sounds.   Pulmonary:      Effort: Pulmonary effort is normal. No respiratory distress.      Breath sounds: Normal breath sounds. No wheezing.   Abdominal:      General: Bowel sounds are normal. There is no distension.      Palpations: Abdomen is soft.      Tenderness: There is no abdominal tenderness.   Musculoskeletal:         General: No tenderness. Normal range of motion.    "   Cervical back: Normal range of motion.   Skin:     General: Skin is warm and dry.   Neurological:      Mental Status: She is alert and oriented to person, place, and time.      Cranial Nerves: No cranial nerve deficit.      Deep Tendon Reflexes: Reflexes are normal and symmetric.         Lab Results   Component Value Date    WBC 4.15 08/06/2024    HGB 12.2 08/06/2024    HCT 40.2 08/06/2024     08/06/2024    CHOL 232 (H) 08/06/2024    TRIG 82 08/06/2024    HDL 62 08/06/2024    ALT 15 08/06/2024    AST 15 08/06/2024     02/04/2025    K 4.3 02/04/2025     02/04/2025    CREATININE 0.7 02/04/2025    BUN 16 02/04/2025    CO2 28 02/04/2025    TSH 2.145 02/04/2025    INR 1.0 04/14/2005    HGBA1C 5.6 08/06/2024       Assessment:       1. Encounter for annual physical exam    2. Prediabetes    3. Mixed hyperlipidemia    4. Aortic atherosclerosis    5. Laboratory examination ordered as part of a routine general medical examination    6. Abnormal EKG    7. Myalgia due to statin    8. Encounter for screening for cardiovascular disorders    9. Alpha thalassemia trait    10. Hyperparathyroidism, unspecified    11. Age-related osteoporosis without current pathological fracture    12. Screening for colon cancer due 7/2026    13. Grief    14. Anxiety    15. Breast cancer screening by mammogram    16. History of benign gastric tumor          Plan:     Kj was seen today for annual exam, abdominal pain and diarrhea.    Diagnoses and all orders for this visit:    Encounter for annual physical exam    Prediabetes  -     CBC Auto Differential; Future  -     Comprehensive Metabolic Panel; Future  -     Hemoglobin A1C; Future    Mixed hyperlipidemia  -     TSH; Future  -     Lipid Panel; Future    Aortic atherosclerosis  -     CT Cardiac Scoring; Future  -     Exercise Stress - EKG; Future    Laboratory examination ordered as part of a routine general medical examination    Abnormal EKG  -     CT Cardiac Scoring;  Future  -     Exercise Stress - EKG; Future    Myalgia due to statin  Comments:  b/l shoulder pain and elevated LFTs with 3 months of atorvastatin. Has not tried other statins since then.    Encounter for screening for cardiovascular disorders  -     CT Cardiac Scoring; Future  -     Exercise Stress - EKG; Future    Alpha thalassemia trait    Hyperparathyroidism, unspecified    Age-related osteoporosis without current pathological fracture    Screening for colon cancer due 7/2026    Grief  -     Ambulatory referral/consult to Primary Care Behavioral Health (Non-Opioids); Future    Anxiety  -     Ambulatory referral/consult to Primary Care Behavioral Health (Non-Opioids); Future    Breast cancer screening by mammogram  -     Mammo Digital Screening Bilat; Future    History of benign gastric tumor    The 10-year ASCVD risk score (Nuvia DK, et al., 2019) is: 25.1%    Values used to calculate the score:      Age: 79 years      Sex: Female      Is Non- : Yes      Diabetic: No      Tobacco smoker: No      Systolic Blood Pressure: 132 mmHg      Is BP treated: No      HDL Cholesterol: 62 mg/dL      Total Cholesterol: 232 mg/dL      Health maintenance reviewed with patient. Patient is due for shingles #2 and RSV.    Follow up in about 6 months (around 8/6/2025).    Ezequiel Pastrana MD  Internal Medicine / Primary Care  [facility]  2/6/2025

## 2025-02-06 NOTE — PATIENT INSTRUCTIONS
Schedule fasting labwork in 6 months  Schedule exercise stress EKG  Schedule CT calcium scoring    Referral to behavioral health for therapy has been placed, they will be calling you to schedule.     Recommended vaccinations -   Shingles #2  RSV vaccination        Return to clinic in 6 months or sooner if needed.

## 2025-02-07 ENCOUNTER — OFFICE VISIT (OUTPATIENT)
Dept: UROLOGY | Facility: CLINIC | Age: 80
End: 2025-02-07
Payer: MEDICARE

## 2025-02-07 VITALS — WEIGHT: 160.19 LBS | BODY MASS INDEX: 25.85 KG/M2

## 2025-02-07 DIAGNOSIS — R39.15 URINARY URGENCY: ICD-10-CM

## 2025-02-07 DIAGNOSIS — R10.9 FLANK PAIN: ICD-10-CM

## 2025-02-07 DIAGNOSIS — N39.0 RECURRENT UTI: Primary | ICD-10-CM

## 2025-02-07 PROCEDURE — 1159F MED LIST DOCD IN RCRD: CPT | Mod: CPTII,S$GLB,, | Performed by: UROLOGY

## 2025-02-07 PROCEDURE — 99999 PR PBB SHADOW E&M-EST. PATIENT-LVL III: CPT | Mod: PBBFAC,,, | Performed by: UROLOGY

## 2025-02-07 PROCEDURE — 99213 OFFICE O/P EST LOW 20 MIN: CPT | Mod: S$GLB,,, | Performed by: UROLOGY

## 2025-02-07 PROCEDURE — 1160F RVW MEDS BY RX/DR IN RCRD: CPT | Mod: CPTII,S$GLB,, | Performed by: UROLOGY

## 2025-02-07 PROCEDURE — 3288F FALL RISK ASSESSMENT DOCD: CPT | Mod: CPTII,S$GLB,, | Performed by: UROLOGY

## 2025-02-07 PROCEDURE — 1101F PT FALLS ASSESS-DOCD LE1/YR: CPT | Mod: CPTII,S$GLB,, | Performed by: UROLOGY

## 2025-02-07 NOTE — PROGRESS NOTES
Subjective:       Patient ID: Kj Chávez is a 79 y.o. female  The patient's last visit with me was on 12/27/2024.     Chief Complaint:   Chief Complaint   Patient presents with    Follow-up    Results     Tom results     Abdominal Pain     Left side abdominal pain with nausea        Recurrent UTI  She has been experiencing pressure and itching for a few weeks.  She started with left sided pain last night and she became concerned.  She denies hematuria or fever.  She has these episodes periodically.      She denies previous  or Gyn surgeries.      02/07/2025  She has had a lot of GI upset recently.     ACTIVE MEDICAL ISSUES:  Patient Active Problem List   Diagnosis    Alpha thalassemia trait    History of colon polyps    Aortic atherosclerosis    History of benign gastric tumor: see notes from EGD 2015, benign.  EGD 8/17 stable    Osteoporosis    Renal cysts, acquired, bilateral    Decreased strength of trunk and back    Decreased range of motion of lumbar spine    Hyperparathyroidism, unspecified    Fall       PAST MEDICAL HISTORY  Past Medical History:   Diagnosis Date    Allergy     Cataract     Colon polyp     Gastric nodule     GERD (gastroesophageal reflux disease)     Hypertension     mild, resolved     Osteoporosis        PAST SURGICAL HISTORY:  Past Surgical History:   Procedure Laterality Date    COLONOSCOPY N/A 2/23/2016    Procedure: COLONOSCOPY;  Surgeon: Christian Medina MD;  Location: [address]);  Service: Endoscopy;  Laterality: N/A;    COLONOSCOPY N/A 7/19/2021    Procedure: COLONOSCOPY;  Surgeon: Maile Lal MD;  Location: [facility] ([address] [place] [address]);  Service: Endoscopy;  Laterality: N/A;  Vaccinated 2/3/21 see Immunizations, prep instr emailed -ml    UPPER GASTROINTESTINAL ENDOSCOPY         SOCIAL HISTORY:  Social History     Tobacco Use    Smoking status: Never     Passive exposure: Never    Smokeless tobacco: Never   Substance Use Topics    Alcohol use: No    Drug use: No        FAMILY HISTORY:  Family History   Problem Relation Name Age of Onset    Colon cancer Mother  80    Cancer Mother          colon    No Known Problems Father      Breast cancer Sister  50    Cancer Sister          breast    No Known Problems Daughter      No Known Problems Son      No Known Problems Daughter      Ovarian cancer Neg Hx      Diabetes Neg Hx      Hypertension Neg Hx      Stroke Neg Hx      Esophageal cancer Neg Hx         ALLERGIES AND MEDICATIONS: updated and reviewed.  Review of patient's allergies indicates:   Allergen Reactions    Atorvastatin      Liver enzyme elevation    Sulfa (sulfonamide antibiotics) Swelling    Codeine Anxiety     Current Outpatient Medications   Medication Sig    calcium citrate-vitamin D3 315-200 mg (CITRACAL+D) 315-200 mg-unit per tablet Take 1 tablet by mouth 2 (two) times daily.    cetirizine (ZYRTEC) 10 MG tablet Take 10 mg by mouth once daily.    diclofenac (VOLTAREN) 75 MG EC tablet Take 1 tablet by mouth twice daily    fish oil-omega-3 fatty acids 300-1,000 mg capsule Take 2 g by mouth once daily.    fluticasone (FLONASE) 50 mcg/actuation nasal spray 2 sprays by Each Nare route once daily.    oxybutynin (DITROPAN-XL) 5 MG TR24 Take 1 tablet (5 mg total) by mouth once daily.     No current facility-administered medications for this visit.       Review of Systems   Constitutional:  Negative for chills, fatigue and fever.   Respiratory:  Negative for chest tightness and shortness of breath.    Cardiovascular:  Negative for chest pain.   Gastrointestinal:  Negative for abdominal distention, constipation, nausea and vomiting.   Genitourinary:  Positive for flank pain and pelvic pain. Negative for difficulty urinating, dysuria, frequency, hematuria and urgency.   Musculoskeletal:  Negative for arthralgias.   Neurological:  Negative for light-headedness.   Psychiatric/Behavioral:  Negative for confusion.        Objective:      Vitals:    02/07/25 1304   Weight: 72.7 kg  (160 lb 2.6 oz)       Physical Exam  Vitals and nursing note reviewed.   Constitutional:       Appearance: She is well-developed.   HENT:      Head: Normocephalic.   Eyes:      Conjunctiva/sclera: Conjunctivae normal.   Neck:      Thyroid: No thyromegaly.      Trachea: No tracheal deviation.   Cardiovascular:      Rate and Rhythm: Normal rate.      Pulses: Normal pulses.      Heart sounds: Normal heart sounds.   Pulmonary:      Effort: Pulmonary effort is normal. No respiratory distress.      Breath sounds: Normal breath sounds. No wheezing.   Abdominal:      General: There is no distension.      Palpations: Abdomen is soft. There is no mass.      Tenderness: There is no abdominal tenderness. There is no guarding or rebound.      Hernia: No hernia is present.   Musculoskeletal:         General: No tenderness. Normal range of motion.      Cervical back: Normal range of motion.   Lymphadenopathy:      Cervical: No cervical adenopathy.   Skin:     General: Skin is warm and dry.      Findings: No erythema or rash.   Neurological:      Mental Status: She is alert and oriented to person, place, and time.   Psychiatric:         Behavior: Behavior normal.         Thought Content: Thought content normal.         Judgment: Judgment normal.         Urine dipstick shows positive for leukocytes.  Micro exam: 10 WBC's per HPF.    US Retroperitoneal Complete  Order: [id number]  Status: Final result       Visible to patient: Yes (not seen)       Next appt: Today at 01:30 PM in Urology (Roosevelt Miller MD)       Dx: Flank pain    0 Result Notes  Details    Reading Physician Reading Date Result Priority   Mary Humphries MD  [phone] 1/31/2025 Routine     Narrative & Impression  EXAMINATION:  US RETROPERITONEAL COMPLETE     CLINICAL HISTORY:  Unspecified abdominal pain     TECHNIQUE:  Ultrasound of the kidneys and urinary bladder was performed including color flow and Doppler evaluation of the kidneys.      COMPARISON:  CT 2019     FINDINGS:  Right kidney: The right kidney measures 11.5 cm. No cortical thinning. No loss of corticomedullary distinction. Resistive index measures 0.65.  2.8 cm lower pole cyst.  No renal stone. No hydronephrosis.     Left kidney: The left kidney measures 11.1 cm. No cortical thinning. No loss of corticomedullary distinction. Resistive index measures 0.64.  No mass. No renal stone. No hydronephrosis.     The bladder is nondistended at the time of exam.     Impression:     Right kidney cyst        Electronically signed by:Mary Humphries MD  Date:                                            01/31/2025  Time:                                           13:04        Exam Ended: 01/31/25 12:01 CST       Assessment:       1. Recurrent UTI    2. Urinary urgency    3. Flank pain            Plan:       1. Recurrent UTI (Primary)  monitor    2. Urinary urgency  Oxybutynin    3. Flank pain  resolved             Follow up in about 6 months (around 8/7/2025) for Follow up Established.

## 2025-02-10 ENCOUNTER — HOSPITAL ENCOUNTER (OUTPATIENT)
Dept: CARDIOLOGY | Facility: HOSPITAL | Age: 80
Discharge: HOME | End: 2025-02-10
Attending: INTERNAL MEDICINE
Payer: MEDICARE

## 2025-02-10 VITALS — HEIGHT: 66 IN | WEIGHT: 158 LBS | BODY MASS INDEX: 25.39 KG/M2

## 2025-02-10 DIAGNOSIS — I70.0 AORTIC ATHEROSCLEROSIS: ICD-10-CM

## 2025-02-10 DIAGNOSIS — Z13.6 ENCOUNTER FOR SCREENING FOR CARDIOVASCULAR DISORDERS: ICD-10-CM

## 2025-02-10 DIAGNOSIS — R94.31 ABNORMAL EKG: ICD-10-CM

## 2025-02-10 LAB
CV STRESS BASE HR: 62 BPM
DIASTOLIC BLOOD PRESSURE: 65 MMHG
OHS CV CPX 1 MINUTE RECOVERY HEART RATE: 98 BPM
OHS CV CPX 85 PERCENT MAX PREDICTED HEART RATE MALE: 121
OHS CV CPX ESTIMATED METS: 5
OHS CV CPX MAX PREDICTED HEART RATE: 142
OHS CV CPX PATIENT IS FEMALE: 1
OHS CV CPX PATIENT IS MALE: 0
OHS CV CPX PEAK DIASTOLIC BLOOD PRESSURE: 89 MMHG
OHS CV CPX PEAK HEAR RATE: 125 BPM
OHS CV CPX PEAK RATE PRESSURE PRODUCT: NORMAL
OHS CV CPX PEAK SYSTOLIC BLOOD PRESSURE: 167 MMHG
OHS CV CPX PERCENT MAX PREDICTED HEART RATE ACHIEVED: 91
OHS CV CPX RATE PRESSURE PRODUCT PRESENTING: 8246
STRESS ECHO POST EXERCISE DUR MIN: 6 MINUTES
STRESS ECHO POST EXERCISE DUR SEC: 25 SECONDS
SYSTOLIC BLOOD PRESSURE: 133 MMHG

## 2025-02-10 PROCEDURE — 93017 CV STRESS TEST TRACING ONLY: CPT

## 2025-02-10 PROCEDURE — 93016 CV STRESS TEST SUPVJ ONLY: CPT | Mod: ,,, | Performed by: INTERNAL MEDICINE

## 2025-02-10 PROCEDURE — 93018 CV STRESS TEST I&R ONLY: CPT | Mod: ,,, | Performed by: INTERNAL MEDICINE

## 2025-02-12 ENCOUNTER — HOSPITAL ENCOUNTER (OUTPATIENT)
Dept: RADIOLOGY | Facility: HOSPITAL | Age: 80
Discharge: HOME | End: 2025-02-12
Attending: INTERNAL MEDICINE
Payer: MEDICARE

## 2025-02-12 DIAGNOSIS — Z13.6 ENCOUNTER FOR SCREENING FOR CARDIOVASCULAR DISORDERS: ICD-10-CM

## 2025-02-12 DIAGNOSIS — I70.0 AORTIC ATHEROSCLEROSIS: ICD-10-CM

## 2025-02-12 DIAGNOSIS — R94.31 ABNORMAL EKG: ICD-10-CM

## 2025-02-12 PROCEDURE — 75571 CT HRT W/O DYE W/CA TEST: CPT | Mod: TC

## 2025-02-12 PROCEDURE — 75571 CT HRT W/O DYE W/CA TEST: CPT | Mod: 26,,, | Performed by: RADIOLOGY

## 2025-02-13 DIAGNOSIS — M79.10 MYALGIA DUE TO STATIN: Primary | ICD-10-CM

## 2025-02-13 DIAGNOSIS — T46.6X5A MYALGIA DUE TO STATIN: Primary | ICD-10-CM

## 2025-02-13 DIAGNOSIS — I70.0 AORTIC ATHEROSCLEROSIS: ICD-10-CM

## 2025-02-13 DIAGNOSIS — E78.2 MIXED HYPERLIPIDEMIA: ICD-10-CM

## 2025-02-13 RX ORDER — PRAVASTATIN SODIUM 20 MG/1
20 TABLET ORAL DAILY
Qty: 90 TABLET | Refills: 3 | Status: SHIPPED | OUTPATIENT
Start: 2025-02-13 | End: 2026-02-13

## 2025-02-14 ENCOUNTER — PATIENT MESSAGE (OUTPATIENT)
Dept: BEHAVIORAL HEALTH | Facility: CLINIC | Age: 80
End: 2025-02-14
Payer: MEDICARE

## 2025-02-14 ENCOUNTER — TELEPHONE (OUTPATIENT)
Dept: INTERNAL MEDICINE | Facility: CLINIC | Age: 80
End: 2025-02-14
Payer: MEDICARE

## 2025-02-14 NOTE — TELEPHONE ENCOUNTER
Pt was notified of provider message and she ahs agreed to starting Pravastatin and repeating lab work next month.    ----- Message from Ezequiel Pastrana MD sent at 2/13/2025  8:34 AM CST -----  Please schedule patient for CMP in 1 month. Starting pravastatin 20. Please explain below to patient.    Stress test was negative. Your calcium CT score is average.     I do recommend starting a cholesterol lowering medication. I know you had elevated liver enzymes with atorvastatin in the past.     I will start you on pravastatin 20 mg daily, and see if you tolerate this better. We will repeat your labwork in 1 month to check your liver function. after starting this medication. If you have muscle aches / weakness after starting this medication, please discontinue it and let my office know immediately.

## 2025-02-21 ENCOUNTER — RESULTS FOLLOW-UP (OUTPATIENT)
Dept: ENDOCRINOLOGY | Facility: CLINIC | Age: 80
End: 2025-02-21

## 2025-02-21 ENCOUNTER — HOSPITAL ENCOUNTER (OUTPATIENT)
Dept: ENDOCRINOLOGY | Facility: CLINIC | Age: 80
Discharge: HOME | End: 2025-02-21
Attending: NURSE PRACTITIONER
Payer: MEDICARE

## 2025-02-21 DIAGNOSIS — E21.3 HYPERPARATHYROIDISM, UNSPECIFIED: ICD-10-CM

## 2025-02-21 PROCEDURE — 76536 US EXAM OF HEAD AND NECK: CPT | Mod: S$GLB,,, | Performed by: INTERNAL MEDICINE

## 2025-03-10 ENCOUNTER — OFFICE VISIT (OUTPATIENT)
Dept: URGENT CARE | Facility: CLINIC | Age: 80
End: 2025-03-10
Payer: MEDICARE

## 2025-03-10 VITALS
DIASTOLIC BLOOD PRESSURE: 71 MMHG | BODY MASS INDEX: 26.33 KG/M2 | RESPIRATION RATE: 16 BRPM | HEART RATE: 58 BPM | WEIGHT: 158 LBS | TEMPERATURE: 98 F | HEIGHT: 65 IN | SYSTOLIC BLOOD PRESSURE: 173 MMHG | OXYGEN SATURATION: 98 %

## 2025-03-10 DIAGNOSIS — J06.9 VIRAL URI WITH COUGH: Primary | ICD-10-CM

## 2025-03-10 DIAGNOSIS — R06.2 WHEEZING: ICD-10-CM

## 2025-03-10 DIAGNOSIS — R05.9 COUGH, UNSPECIFIED TYPE: ICD-10-CM

## 2025-03-10 LAB
CTP QC/QA: YES
SARS CORONAVIRUS 2 ANTIGEN: NEGATIVE

## 2025-03-10 PROCEDURE — 87811 SARS-COV-2 COVID19 W/OPTIC: CPT | Mod: QW,S$GLB,,

## 2025-03-10 PROCEDURE — 99213 OFFICE O/P EST LOW 20 MIN: CPT | Mod: S$GLB,,,

## 2025-03-10 RX ORDER — BENZONATATE 100 MG/1
100 CAPSULE ORAL 3 TIMES DAILY PRN
Qty: 21 CAPSULE | Refills: 0 | Status: SHIPPED | OUTPATIENT
Start: 2025-03-10 | End: 2025-03-17

## 2025-03-10 RX ORDER — ALBUTEROL SULFATE 90 UG/1
2 INHALANT RESPIRATORY (INHALATION) EVERY 6 HOURS PRN
Qty: 18 G | Refills: 0 | Status: SHIPPED | OUTPATIENT
Start: 2025-03-10 | End: 2025-03-15

## 2025-03-10 RX ORDER — FLUTICASONE PROPIONATE 50 MCG
1 SPRAY, SUSPENSION (ML) NASAL DAILY
Qty: 9.9 ML | Refills: 0 | Status: SHIPPED | OUTPATIENT
Start: 2025-03-10 | End: 2025-03-15

## 2025-03-10 NOTE — PROGRESS NOTES
"Subjective:      Patient ID: Kj Chávez is a 79 y.o. female.    Vitals:  height is 5' 5" (1.651 m) and weight is 71.7 kg (158 lb). Her temperature is 98.2 °F (36.8 °C). Her blood pressure is 173/71 (abnormal) and her pulse is 58 (abnormal). Her respiration is 16 and oxygen saturation is 98%.     Chief Complaint: Cough    79 y.o female presents to clinic complaining of cough x 1 week. Patient states cough is worse at night. Patient states wheezing present at night. Patient attempted nyquil to relieve symptoms at night.     Cough  This is a new problem. The current episode started 1 to 4 weeks ago. Associated symptoms include chills, nasal congestion and wheezing. Pertinent negatives include no chest pain, ear congestion, ear pain, fever, headaches, heartburn, hemoptysis, myalgias, postnasal drip, rash, rhinorrhea, sore throat, shortness of breath, sweats or weight loss. The symptoms are aggravated by lying down. She has tried OTC cough suppressant for the symptoms. The treatment provided no relief. There is no history of asthma, bronchiectasis, bronchitis, COPD, emphysema, environmental allergies or pneumonia.       Constitution: Positive for chills. Negative for sweating, fatigue and fever.   HENT:  Positive for congestion. Negative for ear pain, postnasal drip and sore throat.    Cardiovascular:  Negative for chest pain.   Eyes: Negative.    Respiratory:  Positive for cough and wheezing. Negative for bloody sputum and shortness of breath.    Gastrointestinal:  Negative for heartburn.   Genitourinary: Negative.    Musculoskeletal:  Negative for muscle ache.   Skin:  Negative for rash.   Allergic/Immunologic: Negative for environmental allergies.   Neurological:  Negative for headaches.   Hematologic/Lymphatic: Negative.    Psychiatric/Behavioral: Negative.        Objective:     Physical Exam   Constitutional: She is oriented to person, place, and time.  Non-toxic appearance. No distress.   HENT:   Head: " Normocephalic.   Ears:   Right Ear: Tympanic membrane, external ear and ear canal normal.   Left Ear: Tympanic membrane, external ear and ear canal normal.   Nose: Congestion present.   Mouth/Throat: Mucous membranes are moist.   Eyes: Conjunctivae are normal.   Cardiovascular: Normal rate, regular rhythm and normal heart sounds.   Pulmonary/Chest: Effort normal. No stridor. No respiratory distress. She has wheezes. She has no rhonchi. She has no rales.   Abdominal: Soft.   Musculoskeletal: Normal range of motion.         General: Normal range of motion.   Neurological: She is alert and oriented to person, place, and time.   Skin: Skin is warm, dry and not diaphoretic.   Psychiatric: Her behavior is normal. Mood, judgment and thought content normal.     Results for orders placed or performed in visit on 03/10/25   SARS Coronavirus 2 Antigen, POCT Manual Read    Collection Time: 03/10/25  3:06 PM   Result Value Ref Range    SARS Coronavirus 2 Antigen Negative Negative, Presumptive Negative     Acceptable Yes        Assessment:     1. Viral URI with cough    2. Cough, unspecified type    3. Wheezing        Plan:       Viral URI with cough  -     fluticasone propionate (FLONASE) 50 mcg/actuation nasal spray; 1 spray (50 mcg total) by Each Nostril route once daily. for 5 days  Dispense: 9.9 mL; Refill: 0  -     benzonatate (TESSALON) 100 MG capsule; Take 1 capsule (100 mg total) by mouth 3 (three) times daily as needed for Cough.  Dispense: 21 capsule; Refill: 0    Cough, unspecified type  -     SARS Coronavirus 2 Antigen, POCT Manual Read    Wheezing  -     albuterol (VENTOLIN HFA) 90 mcg/actuation inhaler; Inhale 2 puffs into the lungs every 6 (six) hours as needed for Wheezing. Rescue  Dispense: 18 g; Refill: 0      Patient Instructions   - You must understand that you have received an Urgent Care treatment only and that you may be released before all of your medical problems are known or treated.   -  You, the patient, will arrange for follow up care as instructed.   - If your condition worsens or fails to improve we recommend that you receive another evaluation at the ER immediately or contact your PCP to discuss your concerns or return here.  -please keep BP log as discussed and bring to PCP appointment this week  Please drink plenty of fluids  Please get plenty of rest

## 2025-03-13 ENCOUNTER — RESULTS FOLLOW-UP (OUTPATIENT)
Dept: INTERNAL MEDICINE | Facility: CLINIC | Age: 80
End: 2025-03-13

## 2025-03-13 ENCOUNTER — OFFICE VISIT (OUTPATIENT)
Dept: INTERNAL MEDICINE | Facility: CLINIC | Age: 80
End: 2025-03-13
Payer: MEDICARE

## 2025-03-13 ENCOUNTER — LAB VISIT (OUTPATIENT)
Dept: LAB | Facility: HOSPITAL | Age: 80
End: 2025-03-13
Attending: INTERNAL MEDICINE
Payer: MEDICARE

## 2025-03-13 VITALS
OXYGEN SATURATION: 97 % | HEART RATE: 72 BPM | SYSTOLIC BLOOD PRESSURE: 122 MMHG | BODY MASS INDEX: 25.61 KG/M2 | DIASTOLIC BLOOD PRESSURE: 81 MMHG | RESPIRATION RATE: 19 BRPM | HEIGHT: 66 IN | WEIGHT: 159.38 LBS

## 2025-03-13 DIAGNOSIS — E78.2 MIXED HYPERLIPIDEMIA: ICD-10-CM

## 2025-03-13 DIAGNOSIS — B37.0 ORAL CANDIDIASIS: ICD-10-CM

## 2025-03-13 DIAGNOSIS — R05.8 POST-VIRAL COUGH SYNDROME: Primary | ICD-10-CM

## 2025-03-13 DIAGNOSIS — T46.6X5A MYALGIA DUE TO STATIN: ICD-10-CM

## 2025-03-13 DIAGNOSIS — M79.10 MYALGIA DUE TO STATIN: ICD-10-CM

## 2025-03-13 DIAGNOSIS — J06.9 VIRAL URI WITH COUGH: ICD-10-CM

## 2025-03-13 DIAGNOSIS — I70.0 AORTIC ATHEROSCLEROSIS: ICD-10-CM

## 2025-03-13 LAB
ALBUMIN SERPL BCP-MCNC: 3.7 G/DL (ref 3.5–5.2)
ALP SERPL-CCNC: 93 U/L (ref 40–150)
ALT SERPL W/O P-5'-P-CCNC: 12 U/L (ref 10–44)
ANION GAP SERPL CALC-SCNC: 9 MMOL/L (ref 8–16)
AST SERPL-CCNC: 12 U/L (ref 10–40)
BILIRUB SERPL-MCNC: 0.3 MG/DL (ref 0.1–1)
BUN SERPL-MCNC: 11 MG/DL (ref 8–23)
CALCIUM SERPL-MCNC: 9.5 MG/DL (ref 8.7–10.5)
CHLORIDE SERPL-SCNC: 105 MMOL/L (ref 95–110)
CO2 SERPL-SCNC: 25 MMOL/L (ref 23–29)
CREAT SERPL-MCNC: 0.7 MG/DL (ref 0.5–1.4)
EST. GFR  (NO RACE VARIABLE): >60 ML/MIN/1.73 M^2
GLUCOSE SERPL-MCNC: 98 MG/DL (ref 70–110)
POTASSIUM SERPL-SCNC: 3.9 MMOL/L (ref 3.5–5.1)
PROT SERPL-MCNC: 7 G/DL (ref 6–8.4)
SODIUM SERPL-SCNC: 139 MMOL/L (ref 136–145)

## 2025-03-13 PROCEDURE — 1101F PT FALLS ASSESS-DOCD LE1/YR: CPT | Mod: CPTII,S$GLB,, | Performed by: INTERNAL MEDICINE

## 2025-03-13 PROCEDURE — 1159F MED LIST DOCD IN RCRD: CPT | Mod: CPTII,S$GLB,, | Performed by: INTERNAL MEDICINE

## 2025-03-13 PROCEDURE — 3074F SYST BP LT 130 MM HG: CPT | Mod: CPTII,S$GLB,, | Performed by: INTERNAL MEDICINE

## 2025-03-13 PROCEDURE — 3079F DIAST BP 80-89 MM HG: CPT | Mod: CPTII,S$GLB,, | Performed by: INTERNAL MEDICINE

## 2025-03-13 PROCEDURE — 99999 PR PBB SHADOW E&M-EST. PATIENT-LVL III: CPT | Mod: PBBFAC,,, | Performed by: INTERNAL MEDICINE

## 2025-03-13 PROCEDURE — 80053 COMPREHEN METABOLIC PANEL: CPT | Performed by: INTERNAL MEDICINE

## 2025-03-13 PROCEDURE — 3288F FALL RISK ASSESSMENT DOCD: CPT | Mod: CPTII,S$GLB,, | Performed by: INTERNAL MEDICINE

## 2025-03-13 PROCEDURE — 36415 COLL VENOUS BLD VENIPUNCTURE: CPT | Performed by: INTERNAL MEDICINE

## 2025-03-13 PROCEDURE — 99213 OFFICE O/P EST LOW 20 MIN: CPT | Mod: S$GLB,,, | Performed by: INTERNAL MEDICINE

## 2025-03-13 PROCEDURE — 1126F AMNT PAIN NOTED NONE PRSNT: CPT | Mod: CPTII,S$GLB,, | Performed by: INTERNAL MEDICINE

## 2025-03-13 RX ORDER — CLOTRIMAZOLE 10 MG/1
10 LOZENGE ORAL
Qty: 70 TROCHE | Refills: 0 | Status: SHIPPED | OUTPATIENT
Start: 2025-03-13 | End: 2025-04-12

## 2025-03-13 RX ORDER — PROMETHAZINE HYDROCHLORIDE AND DEXTROMETHORPHAN HYDROBROMIDE 6.25; 15 MG/5ML; MG/5ML
5 SYRUP ORAL EVERY 8 HOURS PRN
Qty: 118 ML | Refills: 1 | Status: SHIPPED | OUTPATIENT
Start: 2025-03-13 | End: 2025-03-29

## 2025-03-13 NOTE — PATIENT INSTRUCTIONS
Stay hydrated  Clotrimazole troches (throat lozenge) 4-5 times a day to help with oral thrush.   Warm saltwater gargles 3 times a day (tsp of salt, warm glass of water, gargle 20-30 seconds, spit).     OK to continue using throat lozenges for soothing sore throat.     Promethazine-DM cough syrup as needed for cough.    Return to clinic as needed.     Once you are feeling better, recommend getting RSV and shingles vaccinations.

## 2025-03-13 NOTE — PROGRESS NOTES
Subjective:       Patient ID: Kj Chávez is a 79 y.o. female.    Chief Complaint: Cough ([facility]- Monday - albuterol and tessalon /Chest hurts/2 weeks ago )      HPI  Kj Chávez is a 79 y.o. year old female presents for urgent care appointment. Son-in-law had URI came in from [place] 3/1/2025. Patient's symptoms started 3/3/2025. After mardi, symptoms worsened. Went to urgent care, covid-19 negative. Was not checked for flu at that time. Initially with productive cough, not non productive.     URI symptoms on going for two weeks, slow to improve.     Review of Systems   Constitutional:  Negative for activity change, appetite change, fatigue, fever and unexpected weight change.   HENT:  Positive for postnasal drip, rhinorrhea and sore throat. Negative for congestion, hearing loss, sneezing, trouble swallowing and voice change.    Eyes:  Negative for pain and discharge.   Respiratory:  Positive for cough. Negative for choking, chest tightness, shortness of breath and wheezing.    Cardiovascular:  Negative for chest pain, palpitations and leg swelling.   Gastrointestinal:  Negative for abdominal distention, abdominal pain, blood in stool, constipation, diarrhea, nausea and vomiting.   Endocrine: Negative for polydipsia and polyuria.   Genitourinary:  Negative for difficulty urinating and flank pain.   Musculoskeletal:  Negative for arthralgias, back pain, joint swelling, myalgias and neck pain.   Skin:  Negative for rash.   Neurological:  Negative for dizziness, tremors, seizures, weakness, numbness and headaches.   Psychiatric/Behavioral:  Negative for agitation. The patient is not nervous/anxious.          Past Medical History:   Diagnosis Date    Allergy     Cataract     Colon polyp     Gastric nodule     GERD (gastroesophageal reflux disease)     Hypertension     mild, resolved     Osteoporosis         Prior to Admission medications    Medication Sig Start Date End Date Taking? Authorizing  "Provider   albuterol (VENTOLIN HFA) 90 mcg/actuation inhaler Inhale 2 puffs into the lungs every 6 (six) hours as needed for Wheezing. Rescue 3/10/25 3/15/25 Yes Kimmy Chin FNP-C   benzonatate (TESSALON) 100 MG capsule Take 1 capsule (100 mg total) by mouth 3 (three) times daily as needed for Cough. 3/10/25 3/17/25 Yes Kimmy Chin FNP-C   calcium citrate-vitamin D3 315-200 mg (CITRACAL+D) 315-200 mg-unit per tablet Take 1 tablet by mouth 2 (two) times daily.   Yes Provider, Historical   cetirizine (ZYRTEC) 10 MG tablet Take 10 mg by mouth once daily.   Yes Provider, Historical   diclofenac (VOLTAREN) 75 MG EC tablet Take 1 tablet by mouth twice daily 11/11/24  Yes Abdulkadir Puentes PA-C   fish oil-omega-3 fatty acids 300-1,000 mg capsule Take 2 g by mouth once daily.   Yes Provider, Historical   fluticasone propionate (FLONASE) 50 mcg/actuation nasal spray 1 spray (50 mcg total) by Each Nostril route once daily. for 5 days 3/10/25 3/15/25 Yes Kimmy Chin FNP-C   oxybutynin (DITROPAN-XL) 5 MG TR24 Take 1 tablet (5 mg total) by mouth once daily. 8/6/24 8/6/25 Yes Ezequiel Pastrana MD   pravastatin (PRAVACHOL) 20 MG tablet Take 1 tablet (20 mg total) by mouth once daily. 2/13/25 2/13/26 Yes Ezequiel Pastrana MD        Past medical history, surgical history, and family medical history reviewed and updated as appropriate.    Medications and allergies reviewed.     Objective:          Vitals:    03/13/25 1029   BP: 122/81   BP Location: Left arm   Patient Position: Sitting   Pulse: 72   Resp: 19   SpO2: 97%   Weight: 72.3 kg (159 lb 6.3 oz)   Height: 5' 6" (1.676 m)     Body mass index is 25.73 kg/m².  Physical Exam  Constitutional:       Appearance: She is well-developed.   HENT:      Head: Normocephalic and atraumatic.   Eyes:      Extraocular Movements: Extraocular movements intact.   Cardiovascular:      Rate and Rhythm: Normal rate and regular rhythm.      Heart sounds: Normal heart sounds.   Pulmonary:      " Effort: Pulmonary effort is normal. No respiratory distress.      Breath sounds: Normal breath sounds. No wheezing.   Abdominal:      General: Bowel sounds are normal. There is no distension.      Palpations: Abdomen is soft.      Tenderness: There is no abdominal tenderness.   Musculoskeletal:         General: No tenderness. Normal range of motion.      Cervical back: Normal range of motion.   Skin:     General: Skin is warm and dry.   Neurological:      Mental Status: She is alert and oriented to person, place, and time.      Cranial Nerves: No cranial nerve deficit.      Deep Tendon Reflexes: Reflexes are normal and symmetric.       Lab Results   Component Value Date    WBC 4.15 08/06/2024    HGB 12.2 08/06/2024    HCT 40.2 08/06/2024     08/06/2024    CHOL 232 (H) 08/06/2024    TRIG 82 08/06/2024    HDL 62 08/06/2024    ALT 15 08/06/2024    AST 15 08/06/2024     02/04/2025    K 4.3 02/04/2025     02/04/2025    CREATININE 0.7 02/04/2025    BUN 16 02/04/2025    CO2 28 02/04/2025    TSH 2.145 02/04/2025    INR 1.0 04/14/2005    HGBA1C 5.6 08/06/2024       Assessment:       1. Post-viral cough syndrome    2. Oral candidiasis    3. Viral URI with cough          Plan:     Kj was seen today for cough.    Diagnoses and all orders for this visit:    Post-viral cough syndrome    Oral candidiasis  -     clotrimazole (MYCELEX) 10 mg sugar; Take 1 tablet (10 mg total) by mouth 5 (five) times daily.    Viral URI with cough  -     promethazine-dextromethorphan (PROMETHAZINE-DM) 6.25-15 mg/5 mL Syrp; Take 5 mLs by mouth every 8 (eight) hours as needed (cough).    Discussed supportive care.     Health maintenance reviewed with patient.     Follow up if symptoms worsen or fail to improve.    Ezequiel Pastrana MD  Internal Medicine / Primary Care  [facility]  3/13/2025

## 2025-03-20 ENCOUNTER — OFFICE VISIT (OUTPATIENT)
Dept: SPORTS MEDICINE | Facility: CLINIC | Age: 80
End: 2025-03-20
Payer: MEDICARE

## 2025-03-20 VITALS — SYSTOLIC BLOOD PRESSURE: 154 MMHG | DIASTOLIC BLOOD PRESSURE: 87 MMHG | HEART RATE: 66 BPM

## 2025-03-20 DIAGNOSIS — M17.12 PRIMARY OSTEOARTHRITIS OF LEFT KNEE: ICD-10-CM

## 2025-03-20 DIAGNOSIS — M17.11 PRIMARY OSTEOARTHRITIS OF RIGHT KNEE: Primary | ICD-10-CM

## 2025-03-20 PROCEDURE — 99999 PR PBB SHADOW E&M-EST. PATIENT-LVL III: CPT | Mod: PBBFAC,,, | Performed by: PHYSICIAN ASSISTANT

## 2025-03-20 NOTE — PROGRESS NOTES
Subjective:     Chief Complaint: Kj Chávez is a 79 y.o. female who had concerns including Follow-up of the Right Knee and Pain of the Left Knee.    History of Present Illness    CHIEF COMPLAINT:  - Bilateral knee pain, primarily left knee.    HPI:  Kj presents for follow-up on knee pain. She received a Zilretta injection in her right knee on December 16th, 2024, which provided significant relief. However, her left knee started hurting a few weeks after the right knee injection. She describes some residual pain on the medial side of the right knee. Despite this, she reports improved function, being able to walk and step into the car.    For the left knee, she mentions pain radiating on the medial side. She denies swelling, buckling, or mechanical catching in either knee. Kj retired on May 31st, 2024, due to difficulties managing stairs at work, as her classroom was on the third floor.    Treatment history includes a bilateral gel injection (Durolane) in both knees on February 8th, 2024, which did not provide relief. She also had an Iovera treatment on the right knee in June 2024, which was helpful. Short-acting steroid injections have not provided significant relief in the past.        Previous HPI: Patient presents for follow-up s/p viscosupplementation of bilateral knee with myself on 2/8/2024. Reports symptoms showed no change with viscosupplementation in right knee, and previously only one day relief with CSI in right knee. Worse with any activity to the right knee, including sit-to-stand and going down stairs, similar to previously documented symptoms. Denies new injury or trauma.  Patient presents to discuss continued treatment options.  She has not tried physical therapy and was started on diclofenac a few weeks ago. Pain in right knee is 10/10. She is a  and has to climb stairs for work. She would like to discuss other conservative treatment options. She is not interested  in discussing surgery at this time.    Previous HPI: Patient who works as a  presents to clinic with bilateral knee pain x 1 year. R>L. Denies a specific CAMMY. Pain is located along the medial aspect of bilateral knees. Pain increases with prolonged sitting and at night. No previous history of injections or surgeries in her knees. She has been taking Ibuprofen, Advil, voltaren, and naproxen as needed with minimal relief of pain. She reports occasional buckling of her knees but denies mechanical symptoms. She also experiences intermittent effusions.     Hx of a fall onto her right knee in May 2023. She was seen by her PCP on 5/12/23, where radiographs were taken which were negative for fracture. No increase in her pain after the fall. She is here today to discuss treatment options.     Follow-up  Associated symptoms include joint swelling. Pertinent negatives include no abdominal pain, chest pain, chills, congestion, coughing, fever, headaches, myalgias, nausea, numbness, rash, sore throat or vomiting.       Review of Systems   Constitutional: Negative. Negative for chills, fever, weight gain and weight loss.   HENT:  Negative for congestion and sore throat.    Eyes:  Negative for blurred vision and double vision.   Cardiovascular:  Negative for chest pain, leg swelling and palpitations.   Respiratory:  Negative for cough and shortness of breath.    Hematologic/Lymphatic: Does not bruise/bleed easily.   Skin:  Negative for itching, poor wound healing and rash.   Musculoskeletal:  Positive for joint pain and joint swelling. Negative for back pain, muscle weakness, myalgias and stiffness.   Gastrointestinal:  Negative for abdominal pain, constipation, diarrhea, nausea and vomiting.   Genitourinary: Negative.  Negative for frequency and hematuria.   Neurological:  Negative for dizziness, headaches, numbness, paresthesias and sensory change.   Psychiatric/Behavioral:  Negative for altered mental status and  depression. The patient is not nervous/anxious.    Allergic/Immunologic: Negative for hives.                 Objective:     General: Kj is well-developed, well-nourished, appears stated age, in no acute distress, alert and oriented to time, place and person.     General    Vitals reviewed.  Constitutional: She is oriented to person, place, and time. She appears well-developed and well-nourished. No distress.   HENT:   Head: Normocephalic and atraumatic.   Eyes: EOM are normal.   Cardiovascular:  Normal rate and regular rhythm.            Pulmonary/Chest: Effort normal. No respiratory distress.   Neurological: She is alert and oriented to person, place, and time. She has normal reflexes. No cranial nerve deficit. Coordination normal.   Psychiatric: She has a normal mood and affect. Her behavior is normal. Judgment and thought content normal.     General Musculoskeletal Exam   Gait: abnormal and antalgic       Right Knee Exam     Inspection   Erythema: absent  Scars: absent  Swelling: absent  Effusion: present (Mild)  Deformity: absent  Bruising: absent    Tenderness   The patient is tender to palpation of the medial joint line and patella.    Crepitus   The patient has crepitus of the patella.    Range of Motion   Extension:  0 normal   Flexion:  120 abnormal     Tests   Meniscus   Octavia:  Medial - negative Lateral - negative  Ligament Examination   Lachman: normal (-1 to 2mm)   PCL-Posterior Drawer: normal (0 to 2mm)     MCL - Valgus: normal (0 to 2mm)  LCL - Varus: normal  Pivot Shift: normal (Equal)  Reverse Pivot Shift: normal (Equal)  Posterolateral Corner: stable  Patella   Passive Patellar Tilt: neutral  Patellar Tracking: normal  Patellar Glide (quadrants): Lateral - 1   Medial - 2  Patellar Grind: positive    Other   Popliteal (Baker's) Cyst: absent  Sensation: normal    Left Knee Exam     Inspection   Erythema: absent  Scars: absent  Swelling: absent  Effusion: absent  Deformity: absent  Bruising:  absent    Tenderness   The patient tender to palpation of the medial joint line.    Range of Motion   Extension:  0 normal   Flexion:  130 normal     Tests   Meniscus   Octavia:  Medial - negative Lateral - negative  Stability   Lachman: normal (-1 to 2mm)   PCL-Posterior Drawer: normal (0 to 2mm)  MCL - Valgus: normal (0 to 2mm)  LCL - Varus: normal (0 to 2mm)  Pivot Shift: normal (Equal)  Reverse Pivot Shift: normal (Equal)  Posterolateral Corner: stable  Patella   Passive Patellar Tilt: neutral  Patellar Tracking: normal  Patellar Glide (Quadrants): Lateral - 1 Medial - 2  Patellar Grind: negative    Other   Popliteal (Baker's) Cyst: absent  Sensation: normal    Muscle Strength   Right Lower Extremity   Hip Abduction: 5/5   Quadriceps:  4/5   Hamstrin/5   Left Lower Extremity   Hip Abduction: 5/5   Quadriceps:  5/5   Hamstrin/5     Reflexes     Left Side  Achilles:  2+  Quadriceps:  2+    Right Side   Achilles:  2+  Quadriceps:  2+    Vascular Exam     Right Pulses  Dorsalis Pedis:      2+  Posterior Tibial:      2+        Left Pulses  Dorsalis Pedis:      2+  Posterior Tibial:      2+        RADIOGRAPHS:  Bilateral knees: 24  FINDINGS:  Right knee:     No fracture.  Narrowing of medial compartment without genu varum.  Preserved lateral compartment.  Narrowing of patellofemoral compartment, more so laterally.  Periarticular spurring about the tibiofemoral and patellofemoral compartments.  No suprapatellar bursal effusion or prepatellar soft tissue swelling.  Stable focus of soft tissue calcification adjacent to the inferior superior patella.  Right knee findings unchanged to earlier exam.     Left knee:  No fracture.  Narrowing of medial compartment without genu varum.  Preserved lateral compartment.  Narrowing of patellofemoral compartment more so laterally.  Periarticular spurring about tibiofemoral and patellofemoral compartments.  No suprapatellar bursal effusion or prepatellar soft tissue  swelling.  Stable focus of soft tissue calcification seen involving the lower leg posteriorly.  Left knee findings unchanged to earlier exam.    Assessment:     Encounter Diagnoses   Name Primary?    Primary osteoarthritis of right knee Yes    Primary osteoarthritis of left knee      Plan:   We have discussed a variety of treatment options including medications, injections, physical therapy and other alternative treatments. I also explained the indications, risks and benefits of surgery. Given the patients hx and examination today, I believe she would benefit from continued Zilretta injections. Pt agrees and would like to proceed with this.     I made the decision to obtain old records of the patient including previous notes and imaging. I independently reviewed and interpreted lab results today as well as prior imaging.     1. Diclofenac 75 mg BID PRN. Patient understands to take with food and/or OTC prilosec to decrease GI side effects.  2. Continue quad and patellofemoral strengthening and conditioning.  3. Ice compress to the affected area 2-3x a day for 15-20 minutes as needed for pain management.  4. Authorization placed for bilateral knee Zilretta injections.   5. RTC to see Millicent Marquez PA-C in 2 weeks with Millicent Marquez PA-C for bilateral knee Zilretta injections.      All of the patient's questions were answered and the patient will contact us if they have any questions or concerns in the interim.      Patient questionnaires may have been collected.

## 2025-03-24 DIAGNOSIS — Z00.00 ENCOUNTER FOR MEDICARE ANNUAL WELLNESS EXAM: ICD-10-CM

## 2025-04-11 ENCOUNTER — OFFICE VISIT (OUTPATIENT)
Dept: SPORTS MEDICINE | Facility: CLINIC | Age: 80
End: 2025-04-11
Payer: MEDICARE

## 2025-04-11 VITALS
DIASTOLIC BLOOD PRESSURE: 83 MMHG | HEART RATE: 64 BPM | SYSTOLIC BLOOD PRESSURE: 142 MMHG | WEIGHT: 159.38 LBS | BODY MASS INDEX: 25.73 KG/M2

## 2025-04-11 DIAGNOSIS — M17.11 PRIMARY OSTEOARTHRITIS OF RIGHT KNEE: Primary | ICD-10-CM

## 2025-04-11 DIAGNOSIS — M17.12 PRIMARY OSTEOARTHRITIS OF LEFT KNEE: ICD-10-CM

## 2025-04-11 PROCEDURE — 99999 PR PBB SHADOW E&M-EST. PATIENT-LVL III: CPT | Mod: PBBFAC,,, | Performed by: PHYSICIAN ASSISTANT

## 2025-04-11 NOTE — PROGRESS NOTES
Patient is here for follow up of bilateral knee arthritis. Pt is requesting bilateral knee [facility] injections[facility] Has failed other conservative modalities including NSAIDS, activity modification, weight loss.    The prior shot was tolerated well.    PHYSICAL EXAMINATION:     General: The patient is alert and oriented x 3. Mood is pleasant.   Observation of ears, eyes and nose reveals no gross abnormalities. No   labored breathing observed.     No signs of infection or adverse reaction to knee.    PROCEDURE NOTE:  Aspiration/Injection Procedure bilateral knee    A time out was performed, including verification of patient ID, procedure, site and side, availability of information and equipment, review of safety issues, and agreement with consent, the procedure site was marked.    Aspiration:  After time out was performed, the patient was prepped aseptically with povidone-iodine swabsticks. 0cc's of normal joint fluid was aspirated from the Superolateral aspect of the bilateral Knee Joint using a 22g x 1.5 needle in sterile fashion without complication.     Injection:  Following aspiration, a diagnostic and therapeutic injection of 5cc Zilretta was given under sterile technique in the supine position.     Kj Chávez had no adverse reactions to the medication. Pain decreased. She was instructed to apply ice to the joint for 20 minutes and avoid strenuous activities for 24-36 hours following the injection. She was warned of possible blood sugar and/or blood pressure changes during that time. Following that time, she can resume regular activities.       RTC in 3 months with Millicent Marquez PA-C for follow up.  All questions were answered, pt will contact us for questions or concerns in the interim.

## 2025-05-14 NOTE — PATIENT INSTRUCTIONS
Please drink plenty of fluids.  Please get plenty of rest.  Please return here or go to the Emergency Department for any concerns or worsening of condition.  If you were prescribed a muscle relaxant medication, do not drive or operate heavy equipment or machinery while taking these medications.  If you were not prescribed an anti-inflammatory medication, and if you do not have any history of stomach/intestinal ulcers, or kidney disease, or are not taking a blood thinner such as Coumadin, Plavix, Pradaxa, Eloquis, or Xaralta for example, it is OK to take over the counter Ibuprofen or Advil or Motrin or Aleve as directed.  Do not take these medications on an empty stomach.  If you lose control of your bowel and/or bladder, please go to the nearest Emergency Department immediately.  If you lose sensation in between your legs by your genitalia and/or rectum, please go to the nearest Emergency Department immediately.  If you lose control or sensation of any extremity, please go to the nearest Emergency Department immediately.  Please follow up with your primary care doctor.    If you  smoke, please stop smoking.    Back Pain (Acute or Chronic)    Back pain is one of the most common problems. The good news is that most people feel better in 1 to 2 weeks, and most of the rest in 1 to 2 months. Most people can remain active.  People experience and describe pain differently; not everyone is the same.  · The pain can be sharp, stabbing, shooting, aching, cramping or burning.  · Movement, standing, bending, lifting, sitting, or walking may worsen pain.  · It can be localized to one spot or area, or it can be more generalized.  · It can spread or radiate upwards, to the front, or go down your arms or legs (sciatica).  · It can cause muscle spasm.  Most of the time, mechanical problems with the muscles or spine cause the pain. Mechanical problems are usually caused by an injury to the muscles or ligaments. While illness can  cause back pain, it is usually not caused by a serious illness. Mechanical problems include:   · Physical activity such as sports, exercise, work, or normal activity  · Overexertion, lifting, pushing, pulling incorrectly or too aggressively  · Sudden twisting, bending, or stretching from an accident, or accidental movement  · Poor posture  · Stretching or moving wrong, without noticing pain at the time  · Poor coordination, lack of regular exercise (check with your doctor about this)  · Spinal disc disease or arthritis  · Stress  Pain can also be related to pregnancy, or illness like appendicitis, bladder or kidney infections, pelvic infections, and many other things.  Acute back pain usually gets better in 1 to 2 weeks. Back pain related to disk disease, arthritis in the spinal joints or spinal stenosis (narrowing of the spinal canal) can become chronic and last for months or years.  Unless you had a physical injury (for example, a car accident or fall) X-rays are usually not needed for the initial evaluation of back pain. If pain continues and does not respond to medical treatment, X-rays and other tests may be needed.  Home care  Try these home care recommendations:  · When in bed, try to find a position of comfort. A firm mattress is best. Try lying flat on your back with pillows under your knees. You can also try lying on your side with your knees bent up towards your chest and a pillow between your knees.  · At first, do not try to stretch out the sore spots. If there is a strain, it is not like the good soreness you get after exercising without an injury. In this case, stretching may make it worse.  · Avoid prolong sitting, long car rides, or travel. This puts more stress on the lower back than standing or walking.  · During the first 24 to 72 hours after an acute injury or flare up of chronic back pain, apply an ice pack to the painful area for 20 minutes and then remove it for 20 minutes. Do this over a  period of 60 to 90 minutes or several times a day. This will reduce swelling and pain. Wrap the ice pack in a thin towel or plastic to protect your skin.  · You can start with ice, then switch to heat. Heat (hot shower, hot bath, or heating pad) reduces pain and works well for muscle spasms. Heat can be applied to the painful area for 20 minutes then remove it for 20 minutes. Do this over a period of 60 to 90 minutes or several times a day. Do not sleep on a heating pad. It can lead to skin burns or tissue damage.  · You can alternate ice and heat therapy. Talk with your doctor about the best treatment for your back pain.  · Therapeutic massage can help relax the back muscles without stretching them.  · Be aware of safe lifting methods and do not lift anything without stretching first.  Medicines  Talk to your doctor before using medicine, especially if you have other medical problems or are taking other medicines.  · You may use over-the-counter medicine as directed on the bottle to control pain, unless another pain medicine was prescribed. If you have chronic conditions like diabetes, liver or kidney disease, stomach ulcers, or gastrointestinal bleeding, or are taking blood thinners, talk to your doctor before taking any medicine.  · Be careful if you are given a prescription medicines, narcotics, or medicine for muscle spasms. They can cause drowsiness, affect your coordination, reflexes, and judgement. Do not drive or operate heavy machinery.  Follow-up care  Follow up with your healthcare provider, or as advised.   A radiologist will review any X-rays that were taken. Your provide will notify you of any new findings that may affect your care.  Call 911  Call emergency services if any of the following occur:  · Trouble breathing  · Confusion  · Very drowsy or trouble awakening  · Fainting or loss of consciousness  · Rapid or very slow heart rate  · Loss of bowel or bladder control  When to seek medical  advice  Call your healthcare provider right away if any of these occur:   · Pain becomes worse or spreads to your legs  · Weakness or numbness in one or both legs  · Numbness in the groin or genital area  Date Last Reviewed: 7/1/2016 © 2000-2017 [employer]. [address] [place] [id number]. All rights reserved. This information is not intended as a substitute for professional medical care. Always follow your healthcare professional's instructions.         verbal instruction/written material

## 2025-05-20 ENCOUNTER — TELEPHONE (OUTPATIENT)
Dept: INTERNAL MEDICINE | Facility: CLINIC | Age: 80
End: 2025-05-20
Payer: MEDICARE

## 2025-05-20 NOTE — TELEPHONE ENCOUNTER
"Please make sure patient is aware that we cannot address urgent issues at [facility] - booked in comments as "headaches"  "

## 2025-05-21 ENCOUNTER — TELEPHONE (OUTPATIENT)
Dept: INTERNAL MEDICINE | Facility: CLINIC | Age: 80
End: 2025-05-21
Payer: MEDICARE

## 2025-05-21 ENCOUNTER — OFFICE VISIT (OUTPATIENT)
Dept: INTERNAL MEDICINE | Facility: CLINIC | Age: 80
End: 2025-05-21
Payer: MEDICARE

## 2025-05-21 VITALS
BODY MASS INDEX: 24.8 KG/M2 | HEART RATE: 57 BPM | OXYGEN SATURATION: 97 % | HEIGHT: 66 IN | WEIGHT: 154.31 LBS | DIASTOLIC BLOOD PRESSURE: 88 MMHG | SYSTOLIC BLOOD PRESSURE: 138 MMHG

## 2025-05-21 DIAGNOSIS — R51.9 NONINTRACTABLE EPISODIC HEADACHE, UNSPECIFIED HEADACHE TYPE: Primary | ICD-10-CM

## 2025-05-21 DIAGNOSIS — I10 ESSENTIAL HYPERTENSION: ICD-10-CM

## 2025-05-21 DIAGNOSIS — Z91.81 HISTORY OF FALL: ICD-10-CM

## 2025-05-21 PROCEDURE — 99214 OFFICE O/P EST MOD 30 MIN: CPT | Mod: S$GLB,,, | Performed by: INTERNAL MEDICINE

## 2025-05-21 PROCEDURE — 99999 PR PBB SHADOW E&M-EST. PATIENT-LVL IV: CPT | Mod: PBBFAC,,, | Performed by: INTERNAL MEDICINE

## 2025-05-21 PROCEDURE — 3079F DIAST BP 80-89 MM HG: CPT | Mod: CPTII,S$GLB,, | Performed by: INTERNAL MEDICINE

## 2025-05-21 PROCEDURE — 1101F PT FALLS ASSESS-DOCD LE1/YR: CPT | Mod: CPTII,S$GLB,, | Performed by: INTERNAL MEDICINE

## 2025-05-21 PROCEDURE — 1125F AMNT PAIN NOTED PAIN PRSNT: CPT | Mod: CPTII,S$GLB,, | Performed by: INTERNAL MEDICINE

## 2025-05-21 PROCEDURE — 1159F MED LIST DOCD IN RCRD: CPT | Mod: CPTII,S$GLB,, | Performed by: INTERNAL MEDICINE

## 2025-05-21 PROCEDURE — 3075F SYST BP GE 130 - 139MM HG: CPT | Mod: CPTII,S$GLB,, | Performed by: INTERNAL MEDICINE

## 2025-05-21 PROCEDURE — 3288F FALL RISK ASSESSMENT DOCD: CPT | Mod: CPTII,S$GLB,, | Performed by: INTERNAL MEDICINE

## 2025-05-21 RX ORDER — LOSARTAN POTASSIUM 25 MG/1
25 TABLET ORAL DAILY
Qty: 90 TABLET | Refills: 3 | Status: SHIPPED | OUTPATIENT
Start: 2025-05-21 | End: 2026-05-21

## 2025-05-21 NOTE — TELEPHONE ENCOUNTER
----- Message from Kaya sent at 5/20/2025  4:07 PM CDT -----  Contact: [phone]  Type: Returning a callWho left a message? Missed the call When did the practice call? Today 2;54 pm Does patient know what this is regarding: no Would the patient rather a call back or a response via [facility]? Call back Comments:  
Called patient LVM.  
Yes

## 2025-05-21 NOTE — PATIENT INSTRUCTIONS
Check your blood pressures at home, twice a day, for 1 week, just to make sure that it is within reasonable range. Goal blood pressure <130/<80 (120s/70s).  Nurse visit in 1 week for blood pressure check. If elevated, start losartan 25 mg daily for blood pressure.     Return to clinic as scheduled on 8/13/2025.

## 2025-05-21 NOTE — PROGRESS NOTES
Subjective:       Patient ID: Kj Chávez is a 79 y.o. female.    Chief Complaint: Headache      HPI  Kj Chávez is a 79 y.o. year old female established patient presents for evaluation of headache on going for several months. Occurs 2-3 times a week, lasts for a few minutes, resolves with patting the area with her palm. Has never taken any medication for it. Not occurring more frequently than previous. No associated nausea, vomiting, dizziness, vision change, fever, chills, focal deficits. Occurs sporadically. Sometimes will be present when she wakes up. Does report history of fall with head trauma which occurred two years ago.     Review of Systems   Constitutional:  Negative for activity change, appetite change, fatigue, fever and unexpected weight change.   HENT:  Negative for congestion, hearing loss, postnasal drip, sneezing, sore throat, trouble swallowing and voice change.    Eyes:  Negative for pain and discharge.   Respiratory:  Negative for cough, choking, chest tightness, shortness of breath and wheezing.    Cardiovascular:  Negative for chest pain, palpitations and leg swelling.   Gastrointestinal:  Negative for abdominal distention, abdominal pain, blood in stool, constipation, diarrhea, nausea and vomiting.   Endocrine: Negative for polydipsia and polyuria.   Genitourinary:  Negative for difficulty urinating and flank pain.   Musculoskeletal:  Negative for arthralgias, back pain, joint swelling, myalgias and neck pain.   Skin:  Negative for rash.   Neurological:  Negative for dizziness, tremors, seizures, weakness, numbness and headaches.   Psychiatric/Behavioral:  Negative for agitation. The patient is not nervous/anxious.          Past Medical History:   Diagnosis Date    Allergy     Cataract     Colon polyp     Gastric nodule     GERD (gastroesophageal reflux disease)     Hypertension     mild, resolved     Osteoporosis         Prior to Admission medications    Medication Sig Start  "Date End Date Taking? Authorizing Provider   calcium citrate-vitamin D3 315-200 mg (CITRACAL+D) 315-200 mg-unit per tablet Take 1 tablet by mouth 2 (two) times daily.   Yes Provider, Historical   cetirizine (ZYRTEC) 10 MG tablet Take 10 mg by mouth once daily.   Yes Provider, Historical   diclofenac (VOLTAREN) 75 MG EC tablet Take 1 tablet by mouth twice daily 11/11/24  Yes Abdulkadir Puentes PA-C   fish oil-omega-3 fatty acids 300-1,000 mg capsule Take 2 g by mouth once daily.   Yes Provider, Historical   oxybutynin (DITROPAN-XL) 5 MG TR24 Take 1 tablet (5 mg total) by mouth once daily. 8/6/24 8/6/25 Yes Ezequiel Pastrana MD   pravastatin (PRAVACHOL) 20 MG tablet Take 1 tablet (20 mg total) by mouth once daily. 2/13/25 2/13/26 Yes Ezequiel Pastrana MD   albuterol (VENTOLIN HFA) 90 mcg/actuation inhaler Inhale 2 puffs into the lungs every 6 (six) hours as needed for Wheezing. Rescue 3/10/25 3/15/25  Kimmy Chin FNP-MIKE   losartan (COZAAR) 25 MG tablet Take 1 tablet (25 mg total) by mouth once daily. 5/21/25 5/21/26  Ezequiel Pastrana MD        Past medical history, surgical history, and family medical history reviewed and updated as appropriate.    Medications and allergies reviewed.     Objective:          Vitals:    05/21/25 1509 05/21/25 1828   BP: (!) 140/80 138/88   BP Location: Right arm    Patient Position: Sitting    Pulse: (!) 57    SpO2: 97%    Weight: 70 kg (154 lb 5.2 oz)    Height: 5' 6" (1.676 m)      Body mass index is 24.91 kg/m².  Physical Exam  Constitutional:       Appearance: She is well-developed.   HENT:      Head: Normocephalic and atraumatic.   Eyes:      Pupils: Pupils are equal, round, and reactive to light.   Cardiovascular:      Rate and Rhythm: Normal rate and regular rhythm.      Heart sounds: Normal heart sounds.   Pulmonary:      Effort: Pulmonary effort is normal. No respiratory distress.      Breath sounds: Normal breath sounds. No wheezing.   Abdominal:      General: Bowel sounds are " normal. There is no distension.      Palpations: Abdomen is soft.      Tenderness: There is no abdominal tenderness.   Musculoskeletal:         General: No tenderness. Normal range of motion.      Cervical back: Normal range of motion.   Skin:     General: Skin is warm and dry.   Neurological:      Mental Status: She is alert and oriented to person, place, and time.      Cranial Nerves: No cranial nerve deficit.      Deep Tendon Reflexes: Reflexes are normal and symmetric.         Lab Results   Component Value Date    WBC 4.15 08/06/2024    HGB 12.2 08/06/2024    HCT 40.2 08/06/2024     08/06/2024    CHOL 232 (H) 08/06/2024    TRIG 82 08/06/2024    HDL 62 08/06/2024    ALT 12 03/13/2025    AST 12 03/13/2025     03/13/2025    K 3.9 03/13/2025     03/13/2025    CREATININE 0.7 03/13/2025    BUN 11 03/13/2025    CO2 25 03/13/2025    TSH 2.145 02/04/2025    INR 1.0 04/14/2005    HGBA1C 5.6 08/06/2024       Assessment:       1. Nonintractable episodic headache, unspecified headache type    2. Essential hypertension    3. History of fall          Plan:     Kj was seen today for headache.    Diagnoses and all orders for this visit:    Nonintractable episodic headache, unspecified headache type  Comments:  R apical / temporal, lasting 2-3 minutes, occurs a couple times a week. no neurologic deficits, no meds. reassurance provided. on going for months    Essential hypertension  Comments:  bp elevated in clinic, pt reports better control at home. bp logs x 1 week, nurse visit 1 week. if elevated, start losartan 25.  Orders:  -     losartan (COZAAR) 25 MG tablet; Take 1 tablet (25 mg total) by mouth once daily.    History of fall  Comments:  mechanical fall 2 years, mechanical fall (child ran away from her). hit head on concrete, same area as headache.      Reassurance at this time, if worsens, will investigate further.     Health maintenance reviewed with patient.   Follow up in about 12 weeks (around  8/13/2025).    Ezequiel Pastrana MD  Internal Medicine / Primary Care  [facility]  5/21/2025

## 2025-05-28 ENCOUNTER — CLINICAL SUPPORT (OUTPATIENT)
Dept: INTERNAL MEDICINE | Facility: CLINIC | Age: 80
End: 2025-05-28
Payer: MEDICARE

## 2025-05-28 ENCOUNTER — TELEPHONE (OUTPATIENT)
Dept: INTERNAL MEDICINE | Facility: CLINIC | Age: 80
End: 2025-05-28

## 2025-05-28 VITALS — DIASTOLIC BLOOD PRESSURE: 70 MMHG | OXYGEN SATURATION: 99 % | SYSTOLIC BLOOD PRESSURE: 130 MMHG | HEART RATE: 82 BPM

## 2025-05-28 DIAGNOSIS — I10 ESSENTIAL HYPERTENSION: Primary | ICD-10-CM

## 2025-05-28 PROCEDURE — 99999 PR PBB SHADOW E&M-EST. PATIENT-LVL I: CPT | Mod: PBBFAC,,,

## 2025-05-28 NOTE — TELEPHONE ENCOUNTER
Pt here for BP nurse visit, 2 pt identifiers used. Pt does not take blood pressure medication at home but states she does monitor her blood pressure which is normal. Pt's blood pressure was taken manually twice. Her first reading was 140/70, pt sat in lobby for 7-10 minutes before second reading. Pt's last reading updated in the chart

## 2025-06-09 ENCOUNTER — TELEPHONE (OUTPATIENT)
Dept: INTERNAL MEDICINE | Facility: CLINIC | Age: 80
End: 2025-06-09
Payer: MEDICARE

## 2025-06-09 NOTE — TELEPHONE ENCOUNTER
Copied from CRM #[id number]. Topic: General Inquiry - Patient Advice  >> Jun 9, 2025  1:10 PM Janeen wrote:  .1MEDICALADVICE     Patient is calling for Medical Advice regarding: pt states that she is out of town and she had to visit the er for her back and was given  Cyclobenzaprine, Meloxicam and Methylprednisolone. She states that she has to follow up with an orthopedic Dr. Justin Wiley in [place]. She states that she will be scheduling a follow up visit when she returns to [facility] [address] in July. Please call and advise.    How long has patient had these symptoms:    Pharmacy name and phone#:    Patient wants a call back or thru [facility]:    Comments:    Please advise patient replies from provider may take up to 48 hours.

## 2025-07-24 NOTE — TRANSFER OF CARE
Rehab Group    Start time: 1715  End time: 1800  Patient time total: 45 minutes    attended full group    #6 attended   Group Type: recreation   Group Topic Covered: activity therapy, cognitive activities, and healthy leisure time       Group Session Detail:  TR leisure group     Patient Response/Contribution:  cooperative with task, attentive, and actively engaged       Patient Detail:    Pt participated in Therapeutic Recreation group with focus on leisure participation, communication skills, and critical thinking. Engaged and focused in the group recreational activity via a group game.  Pt was a full participant throughout the entire duration of group.  Appropriately shared sense of humor with peers during group and appeared to brighten with social interaction. Pt seemed to still be adjusting to being on the unit, but was sociable with others during the group activity.       Activity Therapy Per 15 min ()      Patient Active Problem List   Diagnosis    Scabies    Recurrent major depression    Trauma and stressor-related disorder    Suicidal ideation        "Anesthesia Transfer of Care Note    Patient: Kj Chávez    Procedure(s) Performed: Procedure(s) (LRB):  ESOPHAGOGASTRODUODENOSCOPY (EGD) (N/A)    Patient location: [facility]    Anesthesia Type: general    Transport from OR: Transported from OR on room air with adequate spontaneous ventilation    Post pain: adequate analgesia    Post assessment: no apparent anesthetic complications and tolerated procedure well    Post vital signs: stable    Level of consciousness: awake and responds to stimulation    Nausea/Vomiting: no nausea/vomiting    Complications: none    Transfer of care protocol was followed      Last vitals:   Visit Vitals  BP (!) 99/59 (BP Location: Left arm, Patient Position: Sitting, BP Method: Automatic)   Pulse 77   Temp 37.1 °C (98.7 °F) (Tympanic)   Resp 12   Ht 5' 6" (1.676 m)   Wt 71.7 kg (158 lb)   SpO2 (!) 92%   Breastfeeding? No   BMI 25.50 kg/m²     "

## 2025-07-28 ENCOUNTER — OFFICE VISIT (OUTPATIENT)
Dept: URGENT CARE | Facility: CLINIC | Age: 80
End: 2025-07-28
Payer: MEDICARE

## 2025-07-28 VITALS
TEMPERATURE: 98 F | HEIGHT: 66 IN | RESPIRATION RATE: 18 BRPM | SYSTOLIC BLOOD PRESSURE: 138 MMHG | OXYGEN SATURATION: 99 % | WEIGHT: 156.5 LBS | HEART RATE: 83 BPM | BODY MASS INDEX: 25.15 KG/M2 | DIASTOLIC BLOOD PRESSURE: 84 MMHG

## 2025-07-28 DIAGNOSIS — Z20.822 EXPOSURE TO COVID-19 VIRUS: Primary | ICD-10-CM

## 2025-07-28 DIAGNOSIS — R05.1 ACUTE COUGH: ICD-10-CM

## 2025-07-28 LAB
CTP QC/QA: YES
SARS-COV+SARS-COV-2 AG RESP QL IA.RAPID: NEGATIVE

## 2025-07-28 PROCEDURE — 99214 OFFICE O/P EST MOD 30 MIN: CPT | Mod: S$GLB,,,

## 2025-07-28 PROCEDURE — 87811 SARS-COV-2 COVID19 W/OPTIC: CPT | Mod: QW,S$GLB,,

## 2025-07-28 RX ORDER — BENZONATATE 100 MG/1
100 CAPSULE ORAL 3 TIMES DAILY PRN
Qty: 21 CAPSULE | Refills: 0 | Status: SHIPPED | OUTPATIENT
Start: 2025-07-28 | End: 2025-08-04

## 2025-07-28 NOTE — PATIENT INSTRUCTIONS
If your condition worsens or fails to improve we recommend that you receive another evaluation at the urgent care/ER immediately or contact your PCP to discuss your concerns. You must understand that you've received an urgent care treatment only and that you may be released before all your medical problems are known or treated. You the patient will arrange for follouwp care as instructed.     we discussed that I think your illness is viral, it will not respond to antibiotics and can last 10-14 days.     Retest for covid at home in 24-48 hours  If you just have drainage you can take plain zyrtec, claritin or allegra   Use Flonase for postnasal drip and nasal congestion  Rest and fluids are also important.     Salt water gargles, warm tea with honey and chloraseptic spray as needed for sore throat.   Tylenol or ibuprofen can also be used as directed for pain unless you have an allergy to them or medical condition such as stomach ulcers, kidney or liver disease or blood thinners etc for which you should not be taking these type of medications.

## 2025-07-28 NOTE — PROGRESS NOTES
"Subjective:      Patient ID: Kj Chávez is a 79 y.o. female.    Vitals:  height is 5' 6" (1.676 m) and weight is 71 kg (156 lb 8.4 oz). Her oral temperature is 98.4 °F (36.9 °C). Her blood pressure is 138/84 and her pulse is 83. Her respiration is 18 and oxygen saturation is 99%.     Chief Complaint: Cough    Kj Chávez is a 79 y.o. female who presents today with a chief complaint of cough & fatigue that began last night.  Patient reports coming back from a convention, traveled by train,  and was exposed to covid. Patient did not take any OTC medications for treatment. Declines any fevers or congestion.     Cough  This is a new problem. The current episode started yesterday. The problem has been unchanged. The problem occurs hourly. The cough is Non-productive. Pertinent negatives include no chest pain, chills, ear congestion, ear pain, fever, headaches, heartburn, hemoptysis, myalgias, nasal congestion, postnasal drip, rash, rhinorrhea, sore throat, shortness of breath, sweats, weight loss or wheezing. The symptoms are aggravated by lying down. She has tried nothing for the symptoms.       Constitution: Positive for fatigue. Negative for chills and fever.   HENT:  Negative for ear pain, postnasal drip and sore throat.    Neck: neck negative.   Cardiovascular:  Negative for chest pain.   Eyes: Negative.    Respiratory:  Positive for cough. Negative for bloody sputum, shortness of breath and wheezing.    Gastrointestinal:  Negative for heartburn.   Genitourinary: Negative.    Musculoskeletal:  Negative for muscle ache.   Skin:  Negative for rash.   Allergic/Immunologic: Negative.    Neurological:  Negative for headaches.   Hematologic/Lymphatic: Negative.    Psychiatric/Behavioral: Negative.        Objective:     Physical Exam   Constitutional: She is oriented to person, place, and time.  Non-toxic appearance. No distress.   HENT:   Head: Normocephalic and atraumatic.   Ears:   Right Ear: Tympanic " membrane, external ear and ear canal normal. no impacted cerumen  Left Ear: Tympanic membrane, external ear and ear canal normal. no impacted cerumen  Nose: Nose normal. No congestion.   Mouth/Throat: Mucous membranes are moist.   Cardiovascular: Normal rate.   Pulmonary/Chest: Effort normal. No stridor. No respiratory distress. She has no wheezes. She has no rhonchi. She has no rales.   Musculoskeletal: Normal range of motion.         General: Normal range of motion.   Neurological: She is alert and oriented to person, place, and time.   Skin: Skin is warm, dry and not diaphoretic.   Psychiatric: Her behavior is normal. Mood, judgment and thought content normal.     Results for orders placed or performed in visit on 07/28/25   SARS Coronavirus 2 Antigen, POCT Manual Read    Collection Time: 07/28/25  4:13 PM   Result Value Ref Range    SARS Coronavirus 2 Antigen Negative Negative, Presumptive Negative     Acceptable Yes        Assessment:     1. Exposure to COVID-19 virus    2. Acute cough        Plan:   I have reviewed the patient chart and pertinent past imaging/labs.  ED precautions given. Patient verbalized understanding.     Exposure to COVID-19 virus  -     SARS Coronavirus 2 Antigen, POCT Manual Read    Acute cough  -     benzonatate (TESSALON) 100 MG capsule; Take 1 capsule (100 mg total) by mouth 3 (three) times daily as needed for Cough.  Dispense: 21 capsule; Refill: 0      Patient Instructions                                                  If your condition worsens or fails to improve we recommend that you receive another evaluation at the urgent care/ER immediately or contact your PCP to discuss your concerns. You must understand that you've received an urgent care treatment only and that you may be released before all your medical problems are known or treated. You the patient will arrange for follouwp care as instructed.     we discussed that I think your illness is viral, it will  not respond to antibiotics and can last 10-14 days.     Retest for covid at home in 24-48 hours  If you just have drainage you can take plain zyrtec, claritin or allegra   Use Flonase for postnasal drip and nasal congestion  Rest and fluids are also important.     Salt water gargles, warm tea with honey and chloraseptic spray as needed for sore throat.   Tylenol or ibuprofen can also be used as directed for pain unless you have an allergy to them or medical condition such as stomach ulcers, kidney or liver disease or blood thinners etc for which you should not be taking these type of medications.

## 2025-07-29 ENCOUNTER — HOSPITAL ENCOUNTER (OUTPATIENT)
Dept: RADIOLOGY | Facility: OTHER | Age: 80
Discharge: HOME | End: 2025-07-29
Attending: NURSE PRACTITIONER
Payer: MEDICARE

## 2025-07-29 DIAGNOSIS — M81.0 AGE-RELATED OSTEOPOROSIS WITHOUT CURRENT PATHOLOGICAL FRACTURE: Chronic | ICD-10-CM

## 2025-07-29 PROCEDURE — 77080 DXA BONE DENSITY AXIAL: CPT | Mod: TC

## 2025-07-29 PROCEDURE — 77080 DXA BONE DENSITY AXIAL: CPT | Mod: 26,,, | Performed by: RADIOLOGY

## 2025-07-30 ENCOUNTER — PATIENT MESSAGE (OUTPATIENT)
Dept: ENDOCRINOLOGY | Facility: CLINIC | Age: 80
End: 2025-07-30
Payer: MEDICARE

## 2025-07-30 ENCOUNTER — TELEPHONE (OUTPATIENT)
Dept: ENDOCRINOLOGY | Facility: CLINIC | Age: 80
End: 2025-07-30
Payer: MEDICARE

## 2025-07-30 NOTE — TELEPHONE ENCOUNTER
Called and left VM for patient      Please call the patient with the following:   Reviewed bone density which is relatively stable. We discussed starting Prolia which I recommend. Are you agreeable to proceed? If so, I will place the order to start the process of insurance authorization.

## 2025-08-09 ENCOUNTER — HOSPITAL ENCOUNTER (OUTPATIENT)
Dept: RADIOLOGY | Facility: HOSPITAL | Age: 80
Discharge: HOME | End: 2025-08-09
Attending: INTERNAL MEDICINE
Payer: MEDICARE

## 2025-08-09 DIAGNOSIS — Z12.31 BREAST CANCER SCREENING BY MAMMOGRAM: ICD-10-CM

## 2025-08-09 PROCEDURE — 77063 BREAST TOMOSYNTHESIS BI: CPT | Mod: TC

## 2025-08-09 PROCEDURE — 77067 SCR MAMMO BI INCL CAD: CPT | Mod: 26,,, | Performed by: RADIOLOGY

## 2025-08-09 PROCEDURE — 77063 BREAST TOMOSYNTHESIS BI: CPT | Mod: 26,,, | Performed by: RADIOLOGY

## 2025-08-13 ENCOUNTER — OFFICE VISIT (OUTPATIENT)
Dept: INTERNAL MEDICINE | Facility: CLINIC | Age: 80
End: 2025-08-13
Payer: MEDICARE

## 2025-08-13 VITALS
OXYGEN SATURATION: 97 % | HEIGHT: 66 IN | BODY MASS INDEX: 25.12 KG/M2 | SYSTOLIC BLOOD PRESSURE: 134 MMHG | DIASTOLIC BLOOD PRESSURE: 78 MMHG | WEIGHT: 156.31 LBS | HEART RATE: 74 BPM

## 2025-08-13 DIAGNOSIS — T46.6X5A MYALGIA DUE TO STATIN: ICD-10-CM

## 2025-08-13 DIAGNOSIS — M81.0 AGE-RELATED OSTEOPOROSIS WITHOUT CURRENT PATHOLOGICAL FRACTURE: ICD-10-CM

## 2025-08-13 DIAGNOSIS — I10 ESSENTIAL HYPERTENSION: ICD-10-CM

## 2025-08-13 DIAGNOSIS — E78.2 MIXED HYPERLIPIDEMIA: ICD-10-CM

## 2025-08-13 DIAGNOSIS — M79.10 MYALGIA DUE TO STATIN: ICD-10-CM

## 2025-08-13 DIAGNOSIS — D56.3 ALPHA THALASSEMIA TRAIT: ICD-10-CM

## 2025-08-13 DIAGNOSIS — Z09 FOLLOW-UP EXAM: Primary | ICD-10-CM

## 2025-08-13 PROCEDURE — 99214 OFFICE O/P EST MOD 30 MIN: CPT | Mod: S$GLB,,, | Performed by: INTERNAL MEDICINE

## 2025-08-13 PROCEDURE — 1101F PT FALLS ASSESS-DOCD LE1/YR: CPT | Mod: CPTII,S$GLB,, | Performed by: INTERNAL MEDICINE

## 2025-08-13 PROCEDURE — 99999 PR PBB SHADOW E&M-EST. PATIENT-LVL III: CPT | Mod: PBBFAC,,, | Performed by: INTERNAL MEDICINE

## 2025-08-13 PROCEDURE — 3288F FALL RISK ASSESSMENT DOCD: CPT | Mod: CPTII,S$GLB,, | Performed by: INTERNAL MEDICINE

## 2025-08-13 PROCEDURE — 3078F DIAST BP <80 MM HG: CPT | Mod: CPTII,S$GLB,, | Performed by: INTERNAL MEDICINE

## 2025-08-13 PROCEDURE — 3075F SYST BP GE 130 - 139MM HG: CPT | Mod: CPTII,S$GLB,, | Performed by: INTERNAL MEDICINE

## 2025-08-13 PROCEDURE — 1125F AMNT PAIN NOTED PAIN PRSNT: CPT | Mod: CPTII,S$GLB,, | Performed by: INTERNAL MEDICINE

## 2025-08-22 ENCOUNTER — TELEPHONE (OUTPATIENT)
Dept: ENDOCRINOLOGY | Facility: CLINIC | Age: 80
End: 2025-08-22
Payer: MEDICARE

## 2025-08-25 ENCOUNTER — TELEPHONE (OUTPATIENT)
Dept: ENDOCRINOLOGY | Facility: CLINIC | Age: 80
End: 2025-08-25
Payer: MEDICARE

## 2025-08-25 DIAGNOSIS — M81.0 AGE-RELATED OSTEOPOROSIS WITHOUT CURRENT PATHOLOGICAL FRACTURE: Primary | ICD-10-CM
